# Patient Record
Sex: FEMALE | Race: WHITE | NOT HISPANIC OR LATINO | Employment: UNEMPLOYED | URBAN - METROPOLITAN AREA
[De-identification: names, ages, dates, MRNs, and addresses within clinical notes are randomized per-mention and may not be internally consistent; named-entity substitution may affect disease eponyms.]

---

## 2017-02-02 ENCOUNTER — APPOINTMENT (OUTPATIENT)
Dept: LAB | Age: 47
End: 2017-02-02
Payer: COMMERCIAL

## 2017-02-02 ENCOUNTER — TRANSCRIBE ORDERS (OUTPATIENT)
Dept: ADMINISTRATIVE | Age: 47
End: 2017-02-02

## 2017-02-02 DIAGNOSIS — N93.9 ABNORMAL UTERINE BLEEDING: ICD-10-CM

## 2017-02-02 DIAGNOSIS — N93.9 ABNORMAL UTERINE BLEEDING: Primary | ICD-10-CM

## 2017-02-02 LAB
ERYTHROCYTE [DISTWIDTH] IN BLOOD BY AUTOMATED COUNT: 12.7 % (ref 11.6–15.1)
HCT VFR BLD AUTO: 44.8 % (ref 34.8–46.1)
HGB BLD-MCNC: 15.3 G/DL (ref 11.5–15.4)
MCH RBC QN AUTO: 31.5 PG (ref 26.8–34.3)
MCHC RBC AUTO-ENTMCNC: 34.2 G/DL (ref 31.4–37.4)
MCV RBC AUTO: 92 FL (ref 82–98)
PLATELET # BLD AUTO: 389 THOUSANDS/UL (ref 149–390)
PMV BLD AUTO: 10 FL (ref 8.9–12.7)
PROLACTIN SERPL-MCNC: 19.3 NG/ML
RBC # BLD AUTO: 4.85 MILLION/UL (ref 3.81–5.12)
TSH SERPL DL<=0.05 MIU/L-ACNC: 4.72 UIU/ML (ref 0.36–3.74)
WBC # BLD AUTO: 13.63 THOUSAND/UL (ref 4.31–10.16)

## 2017-02-02 PROCEDURE — 84443 ASSAY THYROID STIM HORMONE: CPT

## 2017-02-02 PROCEDURE — 36415 COLL VENOUS BLD VENIPUNCTURE: CPT

## 2017-02-02 PROCEDURE — 84146 ASSAY OF PROLACTIN: CPT

## 2017-02-02 PROCEDURE — 85027 COMPLETE CBC AUTOMATED: CPT

## 2017-03-07 ENCOUNTER — APPOINTMENT (OUTPATIENT)
Dept: LAB | Age: 47
End: 2017-03-07
Payer: COMMERCIAL

## 2017-03-07 ENCOUNTER — TRANSCRIBE ORDERS (OUTPATIENT)
Dept: ADMINISTRATIVE | Age: 47
End: 2017-03-07

## 2017-03-07 DIAGNOSIS — F01.50 VASCULAR DEMENTIA WITH DEPRESSED MOOD (HCC): Primary | ICD-10-CM

## 2017-03-07 DIAGNOSIS — F32.A VASCULAR DEMENTIA WITH DEPRESSED MOOD (HCC): Primary | ICD-10-CM

## 2017-03-07 DIAGNOSIS — N93.9 VAGINAL HEMORRHAGE: ICD-10-CM

## 2017-03-07 DIAGNOSIS — F01.50 VASCULAR DEMENTIA WITH DEPRESSED MOOD (HCC): ICD-10-CM

## 2017-03-07 DIAGNOSIS — E03.9 UNSPECIFIED HYPOTHYROIDISM: ICD-10-CM

## 2017-03-07 DIAGNOSIS — N93.9 VAGINAL HEMORRHAGE: Primary | ICD-10-CM

## 2017-03-07 DIAGNOSIS — R73.01 IMPAIRED FASTING GLUCOSE: ICD-10-CM

## 2017-03-07 DIAGNOSIS — F32.A VASCULAR DEMENTIA WITH DEPRESSED MOOD (HCC): ICD-10-CM

## 2017-03-07 LAB
ALBUMIN SERPL BCP-MCNC: 3.5 G/DL (ref 3.5–5)
ALP SERPL-CCNC: 97 U/L (ref 46–116)
ALT SERPL W P-5'-P-CCNC: 49 U/L (ref 12–78)
ANION GAP SERPL CALCULATED.3IONS-SCNC: 6 MMOL/L (ref 4–13)
AST SERPL W P-5'-P-CCNC: 28 U/L (ref 5–45)
B-HCG SERPL-ACNC: <2 MIU/ML
BASOPHILS # BLD AUTO: 0.03 THOUSANDS/ΜL (ref 0–0.1)
BASOPHILS NFR BLD AUTO: 0 % (ref 0–1)
BILIRUB SERPL-MCNC: 0.52 MG/DL (ref 0.2–1)
BUN SERPL-MCNC: 10 MG/DL (ref 5–25)
CALCIUM SERPL-MCNC: 9.2 MG/DL (ref 8.3–10.1)
CHLORIDE SERPL-SCNC: 103 MMOL/L (ref 100–108)
CHOLEST SERPL-MCNC: 197 MG/DL (ref 50–200)
CO2 SERPL-SCNC: 30 MMOL/L (ref 21–32)
CREAT SERPL-MCNC: 1.14 MG/DL (ref 0.6–1.3)
EOSINOPHIL # BLD AUTO: 0.37 THOUSAND/ΜL (ref 0–0.61)
EOSINOPHIL NFR BLD AUTO: 3 % (ref 0–6)
ERYTHROCYTE [DISTWIDTH] IN BLOOD BY AUTOMATED COUNT: 12.3 % (ref 11.6–15.1)
EST. AVERAGE GLUCOSE BLD GHB EST-MCNC: 126 MG/DL
GFR SERPL CREATININE-BSD FRML MDRD: 51.1 ML/MIN/1.73SQ M
GLUCOSE SERPL-MCNC: 108 MG/DL (ref 65–140)
HBA1C MFR BLD: 6 % (ref 4.2–6.3)
HCT VFR BLD AUTO: 42.7 % (ref 34.8–46.1)
HDLC SERPL-MCNC: 40 MG/DL (ref 40–60)
HGB BLD-MCNC: 14.6 G/DL (ref 11.5–15.4)
LDLC SERPL CALC-MCNC: 118 MG/DL (ref 0–100)
LYMPHOCYTES # BLD AUTO: 4.67 THOUSANDS/ΜL (ref 0.6–4.47)
LYMPHOCYTES NFR BLD AUTO: 36 % (ref 14–44)
MCH RBC QN AUTO: 30.9 PG (ref 26.8–34.3)
MCHC RBC AUTO-ENTMCNC: 34.2 G/DL (ref 31.4–37.4)
MCV RBC AUTO: 91 FL (ref 82–98)
MONOCYTES # BLD AUTO: 0.59 THOUSAND/ΜL (ref 0.17–1.22)
MONOCYTES NFR BLD AUTO: 5 % (ref 4–12)
NEUTROPHILS # BLD AUTO: 7.17 THOUSANDS/ΜL (ref 1.85–7.62)
NEUTS SEG NFR BLD AUTO: 56 % (ref 43–75)
NRBC BLD AUTO-RTO: 0 /100 WBCS
PLATELET # BLD AUTO: 433 THOUSANDS/UL (ref 149–390)
PMV BLD AUTO: 9.5 FL (ref 8.9–12.7)
POTASSIUM SERPL-SCNC: 4 MMOL/L (ref 3.5–5.3)
PROT SERPL-MCNC: 7.6 G/DL (ref 6.4–8.2)
RBC # BLD AUTO: 4.72 MILLION/UL (ref 3.81–5.12)
SODIUM SERPL-SCNC: 139 MMOL/L (ref 136–145)
T4 FREE SERPL-MCNC: 1.07 NG/DL (ref 0.76–1.46)
TRIGL SERPL-MCNC: 195 MG/DL
TSH SERPL DL<=0.05 MIU/L-ACNC: 3.13 UIU/ML (ref 0.36–3.74)
WBC # BLD AUTO: 12.86 THOUSAND/UL (ref 4.31–10.16)

## 2017-03-07 PROCEDURE — 83036 HEMOGLOBIN GLYCOSYLATED A1C: CPT

## 2017-03-07 PROCEDURE — 84439 ASSAY OF FREE THYROXINE: CPT

## 2017-03-07 PROCEDURE — 84443 ASSAY THYROID STIM HORMONE: CPT

## 2017-03-07 PROCEDURE — 84702 CHORIONIC GONADOTROPIN TEST: CPT

## 2017-03-07 PROCEDURE — 80061 LIPID PANEL: CPT

## 2017-03-07 PROCEDURE — 80053 COMPREHEN METABOLIC PANEL: CPT

## 2017-03-07 PROCEDURE — 85025 COMPLETE CBC W/AUTO DIFF WBC: CPT

## 2017-03-07 PROCEDURE — 36415 COLL VENOUS BLD VENIPUNCTURE: CPT

## 2018-04-03 ENCOUNTER — APPOINTMENT (OUTPATIENT)
Dept: LAB | Age: 48
End: 2018-04-03
Payer: COMMERCIAL

## 2018-04-03 ENCOUNTER — TRANSCRIBE ORDERS (OUTPATIENT)
Dept: ADMINISTRATIVE | Age: 48
End: 2018-04-03

## 2018-04-03 DIAGNOSIS — I10 MALIGNANT HYPERTENSION: ICD-10-CM

## 2018-04-03 DIAGNOSIS — R73.01 IMPAIRED FASTING GLUCOSE: Primary | ICD-10-CM

## 2018-04-03 DIAGNOSIS — R73.01 IMPAIRED FASTING GLUCOSE: ICD-10-CM

## 2018-04-03 LAB
ALBUMIN SERPL BCP-MCNC: 3.9 G/DL (ref 3.5–5)
ALP SERPL-CCNC: 68 U/L (ref 46–116)
ALT SERPL W P-5'-P-CCNC: 34 U/L (ref 12–78)
ANION GAP SERPL CALCULATED.3IONS-SCNC: 7 MMOL/L (ref 4–13)
AST SERPL W P-5'-P-CCNC: 24 U/L (ref 5–45)
BASOPHILS # BLD AUTO: 0.02 THOUSANDS/ΜL (ref 0–0.1)
BASOPHILS NFR BLD AUTO: 0 % (ref 0–1)
BILIRUB SERPL-MCNC: 0.9 MG/DL (ref 0.2–1)
BUN SERPL-MCNC: 18 MG/DL (ref 5–25)
CALCIUM SERPL-MCNC: 8.9 MG/DL (ref 8.3–10.1)
CHLORIDE SERPL-SCNC: 106 MMOL/L (ref 100–108)
CHOLEST SERPL-MCNC: 179 MG/DL (ref 50–200)
CO2 SERPL-SCNC: 27 MMOL/L (ref 21–32)
CREAT SERPL-MCNC: 1.21 MG/DL (ref 0.6–1.3)
EOSINOPHIL # BLD AUTO: 0.21 THOUSAND/ΜL (ref 0–0.61)
EOSINOPHIL NFR BLD AUTO: 1 % (ref 0–6)
ERYTHROCYTE [DISTWIDTH] IN BLOOD BY AUTOMATED COUNT: 13.3 % (ref 11.6–15.1)
EST. AVERAGE GLUCOSE BLD GHB EST-MCNC: 126 MG/DL
GFR SERPL CREATININE-BSD FRML MDRD: 53 ML/MIN/1.73SQ M
GLUCOSE P FAST SERPL-MCNC: 106 MG/DL (ref 65–99)
HBA1C MFR BLD: 6 % (ref 4.2–6.3)
HCT VFR BLD AUTO: 42.1 % (ref 34.8–46.1)
HDLC SERPL-MCNC: 47 MG/DL (ref 40–60)
HGB BLD-MCNC: 13.8 G/DL (ref 11.5–15.4)
LDLC SERPL CALC-MCNC: 105 MG/DL (ref 0–100)
LYMPHOCYTES # BLD AUTO: 3.19 THOUSANDS/ΜL (ref 0.6–4.47)
LYMPHOCYTES NFR BLD AUTO: 21 % (ref 14–44)
MCH RBC QN AUTO: 30 PG (ref 26.8–34.3)
MCHC RBC AUTO-ENTMCNC: 32.8 G/DL (ref 31.4–37.4)
MCV RBC AUTO: 92 FL (ref 82–98)
MONOCYTES # BLD AUTO: 1.06 THOUSAND/ΜL (ref 0.17–1.22)
MONOCYTES NFR BLD AUTO: 7 % (ref 4–12)
NEUTROPHILS # BLD AUTO: 10.87 THOUSANDS/ΜL (ref 1.85–7.62)
NEUTS SEG NFR BLD AUTO: 71 % (ref 43–75)
NRBC BLD AUTO-RTO: 0 /100 WBCS
PLATELET # BLD AUTO: 337 THOUSANDS/UL (ref 149–390)
PMV BLD AUTO: 9.4 FL (ref 8.9–12.7)
POTASSIUM SERPL-SCNC: 3.9 MMOL/L (ref 3.5–5.3)
PROT SERPL-MCNC: 7.4 G/DL (ref 6.4–8.2)
RBC # BLD AUTO: 4.6 MILLION/UL (ref 3.81–5.12)
SODIUM SERPL-SCNC: 140 MMOL/L (ref 136–145)
TRIGL SERPL-MCNC: 134 MG/DL
TSH SERPL DL<=0.05 MIU/L-ACNC: 3.2 UIU/ML (ref 0.36–3.74)
WBC # BLD AUTO: 15.4 THOUSAND/UL (ref 4.31–10.16)

## 2018-04-03 PROCEDURE — 83036 HEMOGLOBIN GLYCOSYLATED A1C: CPT

## 2018-04-03 PROCEDURE — 80053 COMPREHEN METABOLIC PANEL: CPT

## 2018-04-03 PROCEDURE — 80061 LIPID PANEL: CPT

## 2018-04-03 PROCEDURE — 84443 ASSAY THYROID STIM HORMONE: CPT

## 2018-04-03 PROCEDURE — 36415 COLL VENOUS BLD VENIPUNCTURE: CPT

## 2018-04-03 PROCEDURE — 85025 COMPLETE CBC W/AUTO DIFF WBC: CPT

## 2018-10-03 ENCOUNTER — APPOINTMENT (EMERGENCY)
Dept: CT IMAGING | Facility: HOSPITAL | Age: 48
End: 2018-10-03
Payer: COMMERCIAL

## 2018-10-03 ENCOUNTER — APPOINTMENT (EMERGENCY)
Dept: RADIOLOGY | Facility: HOSPITAL | Age: 48
End: 2018-10-03
Payer: COMMERCIAL

## 2018-10-03 ENCOUNTER — HOSPITAL ENCOUNTER (EMERGENCY)
Facility: HOSPITAL | Age: 48
Discharge: HOME | End: 2018-10-04
Attending: EMERGENCY MEDICINE
Payer: COMMERCIAL

## 2018-10-03 VITALS
OXYGEN SATURATION: 99 % | HEIGHT: 66 IN | DIASTOLIC BLOOD PRESSURE: 80 MMHG | RESPIRATION RATE: 20 BRPM | TEMPERATURE: 97.7 F | HEART RATE: 62 BPM | SYSTOLIC BLOOD PRESSURE: 126 MMHG

## 2018-10-03 DIAGNOSIS — R42 DIZZINESS: ICD-10-CM

## 2018-10-03 DIAGNOSIS — R10.9 ABDOMINAL PAIN: Primary | ICD-10-CM

## 2018-10-03 DIAGNOSIS — N93.9 ABNORMAL UTERINE BLEEDING (AUB): ICD-10-CM

## 2018-10-03 LAB
ALBUMIN SERPL BCP-MCNC: 3.5 G/DL (ref 3.5–5)
ALP SERPL-CCNC: 66 U/L (ref 46–116)
ALT SERPL W P-5'-P-CCNC: 79 U/L (ref 12–78)
ANION GAP SERPL CALCULATED.3IONS-SCNC: 6 MMOL/L (ref 4–13)
AST SERPL W P-5'-P-CCNC: 57 U/L (ref 5–45)
BACTERIA UR QL AUTO: ABNORMAL /HPF
BASOPHILS # BLD AUTO: 0.03 THOUSANDS/ΜL (ref 0–0.1)
BASOPHILS NFR BLD AUTO: 0 % (ref 0–1)
BILIRUB SERPL-MCNC: 0.6 MG/DL (ref 0.2–1)
BILIRUB UR QL STRIP: NEGATIVE
BUN SERPL-MCNC: 19 MG/DL (ref 5–25)
CALCIUM SERPL-MCNC: 8.6 MG/DL (ref 8.3–10.1)
CHLORIDE SERPL-SCNC: 104 MMOL/L (ref 100–108)
CK SERPL-CCNC: 68 U/L (ref 26–192)
CLARITY UR: CLEAR
CO2 SERPL-SCNC: 28 MMOL/L (ref 21–32)
COLOR UR: YELLOW
CREAT SERPL-MCNC: 1.11 MG/DL (ref 0.6–1.3)
EOSINOPHIL # BLD AUTO: 0.24 THOUSAND/ΜL (ref 0–0.61)
EOSINOPHIL NFR BLD AUTO: 3 % (ref 0–6)
ERYTHROCYTE [DISTWIDTH] IN BLOOD BY AUTOMATED COUNT: 12.4 % (ref 11.6–15.1)
EXT PREG TEST URINE: NEGATIVE
GFR SERPL CREATININE-BSD FRML MDRD: 59 ML/MIN/1.73SQ M
GLUCOSE SERPL-MCNC: 103 MG/DL (ref 65–140)
GLUCOSE SERPL-MCNC: 118 MG/DL (ref 65–140)
GLUCOSE UR STRIP-MCNC: NEGATIVE MG/DL
HCT VFR BLD AUTO: 44.1 % (ref 34.8–46.1)
HGB BLD-MCNC: 14.8 G/DL (ref 11.5–15.4)
HGB UR QL STRIP.AUTO: ABNORMAL
IMM GRANULOCYTES # BLD AUTO: 0.01 THOUSAND/UL (ref 0–0.2)
IMM GRANULOCYTES NFR BLD AUTO: 0 % (ref 0–2)
KETONES UR STRIP-MCNC: NEGATIVE MG/DL
LEUKOCYTE ESTERASE UR QL STRIP: NEGATIVE
LIPASE SERPL-CCNC: 138 U/L (ref 73–393)
LYMPHOCYTES # BLD AUTO: 2.78 THOUSANDS/ΜL (ref 0.6–4.47)
LYMPHOCYTES NFR BLD AUTO: 33 % (ref 14–44)
MCH RBC QN AUTO: 31 PG (ref 26.8–34.3)
MCHC RBC AUTO-ENTMCNC: 33.6 G/DL (ref 31.4–37.4)
MCV RBC AUTO: 93 FL (ref 82–98)
MONOCYTES # BLD AUTO: 0.44 THOUSAND/ΜL (ref 0.17–1.22)
MONOCYTES NFR BLD AUTO: 5 % (ref 4–12)
NEUTROPHILS # BLD AUTO: 4.85 THOUSANDS/ΜL (ref 1.85–7.62)
NEUTS SEG NFR BLD AUTO: 59 % (ref 43–75)
NITRITE UR QL STRIP: NEGATIVE
NON-SQ EPI CELLS URNS QL MICRO: ABNORMAL /HPF
NRBC BLD AUTO-RTO: 0 /100 WBCS
PH UR STRIP.AUTO: 7 [PH] (ref 4.5–8)
PLATELET # BLD AUTO: 314 THOUSANDS/UL (ref 149–390)
PMV BLD AUTO: 9.3 FL (ref 8.9–12.7)
POTASSIUM SERPL-SCNC: 4.2 MMOL/L (ref 3.5–5.3)
PROT SERPL-MCNC: 7 G/DL (ref 6.4–8.2)
PROT UR STRIP-MCNC: NEGATIVE MG/DL
RBC # BLD AUTO: 4.77 MILLION/UL (ref 3.81–5.12)
RBC #/AREA URNS AUTO: ABNORMAL /HPF
SODIUM SERPL-SCNC: 138 MMOL/L (ref 136–145)
SP GR UR STRIP.AUTO: 1.01 (ref 1–1.03)
TROPONIN I SERPL-MCNC: <0.02 NG/ML
UROBILINOGEN UR QL STRIP.AUTO: 0.2 E.U./DL
WBC # BLD AUTO: 8.35 THOUSAND/UL (ref 4.31–10.16)
WBC #/AREA URNS AUTO: ABNORMAL /HPF

## 2018-10-03 PROCEDURE — 84484 ASSAY OF TROPONIN QUANT: CPT | Performed by: PHYSICIAN ASSISTANT

## 2018-10-03 PROCEDURE — 85025 COMPLETE CBC W/AUTO DIFF WBC: CPT | Performed by: PHYSICIAN ASSISTANT

## 2018-10-03 PROCEDURE — 82948 REAGENT STRIP/BLOOD GLUCOSE: CPT

## 2018-10-03 PROCEDURE — 96374 THER/PROPH/DIAG INJ IV PUSH: CPT

## 2018-10-03 PROCEDURE — 99285 EMERGENCY DEPT VISIT HI MDM: CPT

## 2018-10-03 PROCEDURE — 83690 ASSAY OF LIPASE: CPT | Performed by: PHYSICIAN ASSISTANT

## 2018-10-03 PROCEDURE — 96376 TX/PRO/DX INJ SAME DRUG ADON: CPT

## 2018-10-03 PROCEDURE — 93005 ELECTROCARDIOGRAM TRACING: CPT

## 2018-10-03 PROCEDURE — 81001 URINALYSIS AUTO W/SCOPE: CPT

## 2018-10-03 PROCEDURE — 96375 TX/PRO/DX INJ NEW DRUG ADDON: CPT

## 2018-10-03 PROCEDURE — 82550 ASSAY OF CK (CPK): CPT | Performed by: PHYSICIAN ASSISTANT

## 2018-10-03 PROCEDURE — 96361 HYDRATE IV INFUSION ADD-ON: CPT

## 2018-10-03 PROCEDURE — 81025 URINE PREGNANCY TEST: CPT | Performed by: PHYSICIAN ASSISTANT

## 2018-10-03 PROCEDURE — 80053 COMPREHEN METABOLIC PANEL: CPT | Performed by: PHYSICIAN ASSISTANT

## 2018-10-03 PROCEDURE — 96372 THER/PROPH/DIAG INJ SC/IM: CPT

## 2018-10-03 PROCEDURE — 74176 CT ABD & PELVIS W/O CONTRAST: CPT

## 2018-10-03 PROCEDURE — 71045 X-RAY EXAM CHEST 1 VIEW: CPT

## 2018-10-03 PROCEDURE — 36415 COLL VENOUS BLD VENIPUNCTURE: CPT | Performed by: PHYSICIAN ASSISTANT

## 2018-10-03 RX ORDER — ONDANSETRON 2 MG/ML
4 INJECTION INTRAMUSCULAR; INTRAVENOUS ONCE
Status: COMPLETED | OUTPATIENT
Start: 2018-10-03 | End: 2018-10-03

## 2018-10-03 RX ORDER — PROMETHAZINE HYDROCHLORIDE 25 MG/ML
25 INJECTION, SOLUTION INTRAMUSCULAR; INTRAVENOUS ONCE
Status: COMPLETED | OUTPATIENT
Start: 2018-10-03 | End: 2018-10-03

## 2018-10-03 RX ORDER — METOCLOPRAMIDE HYDROCHLORIDE 5 MG/ML
10 INJECTION INTRAMUSCULAR; INTRAVENOUS ONCE
Status: COMPLETED | OUTPATIENT
Start: 2018-10-03 | End: 2018-10-03

## 2018-10-03 RX ADMIN — METOCLOPRAMIDE 10 MG: 5 INJECTION, SOLUTION INTRAMUSCULAR; INTRAVENOUS at 22:28

## 2018-10-03 RX ADMIN — PROMETHAZINE HYDROCHLORIDE 25 MG: 25 INJECTION INTRAMUSCULAR; INTRAVENOUS at 22:28

## 2018-10-03 RX ADMIN — SODIUM CHLORIDE 1000 ML: 0.9 INJECTION, SOLUTION INTRAVENOUS at 19:50

## 2018-10-03 RX ADMIN — ONDANSETRON 4 MG: 2 INJECTION INTRAMUSCULAR; INTRAVENOUS at 21:49

## 2018-10-03 RX ADMIN — SODIUM CHLORIDE 1000 ML: 0.9 INJECTION, SOLUTION INTRAVENOUS at 22:29

## 2018-10-03 RX ADMIN — IOHEXOL 50 ML: 240 INJECTION, SOLUTION INTRATHECAL; INTRAVASCULAR; INTRAVENOUS; ORAL at 22:32

## 2018-10-03 RX ADMIN — ONDANSETRON 4 MG: 2 INJECTION INTRAMUSCULAR; INTRAVENOUS at 19:50

## 2018-10-03 NOTE — ED PROVIDER NOTES
History  Chief Complaint   Patient presents with    Chest Pain     chest pain just started, "I have had right sided pain, I have something on my ovaries and I have been bleeding every day for two months  i am very dizzy and can't keep my eyes open " Pt unsteady on her feet in triage  pt fell two weeks ago and "has a goose egg on the back of my head, I don't rmemeber anything  But I didnt get looked at " Pt also reports recently starting gabapentin     Euna Countess Ulysses Grate ([id number]) is a 49 y o   female Adult patient, presenting to the Emergency Department, accompanied by Significant Other, who presents with a chief complaint of Patient presents with: Chest Pain: chest pain just started, "I have had right sided pain, I have something on my ovaries and I have been bleeding every day for two months  i am very dizzy and can't keep my eyes open " Pt unsteady on her feet in triage  pt fell two weeks ago and "has a goose egg on the back of my head, I don't rmemeber anything  But I didnt get looked at " Pt also reports recently starting gabapentin  The patient is a 48-year-old female, seen with her , who assisted with providing both acute and chronic medical history  The patient presents with a relevant medical history including ovarian cysts-ruptured, fibromyalgia, and chronic regional pain syndrome  In addition, the patient has a history of multiple orthopedic surgeries, including cervical fusions, bilateral knee replacements, multiple shoulder reconstructions, as well as multiple other surgical repairs  Patient presents with a chief complaint of chest pain, which he states started earlier today  She states that chest pain is associated with pain in her left anterior chest, with slight radiation to her left arm, associated with paresthesias in both hands    He additionally, the patient presents with right lower quadrant abdominal pain, which she states is moderately severe, as her present for the past several weeks  She describes the pain to her abdomen as throbbing and occasionally sharp, and highly localized to right lower quadrant  She states the pain is worse with direct pressure and movement, and nothing that she has found as relieved the pain  The patient states she does have other areas pain, but these are associated with chronic pain, as previously mentioned, as the patient has had multiple orthopedic injuries and repairs in the past     Additionally, the patient states that she has had spotty vaginal bleeding for the past 8 weeks, which is notably irregular for her  She states that she has had to use panty liners for this, and normally requires one per day, and does not bleed through  Finally, the patient states that she has been slightly lightheaded over the past several weeks, which she believes to be associated with a viral upper respiratory illness, which she has been recovering from  The patient states she has not had fevers in the past several weeks, has not had cough, abnormal back pain, abnormal joint pain, has not had headache, syncope, near-syncope, or other constitutional symptoms  Socially, the patient states that she was previously living in [place], but was evicted from her home, and is "currently in between residences"  As such, the patient has been living with friends for the past few weeks  Medications: Medications, as per nursing MAR  Allergies: No reported food allergies, No reported environmental allergies, Percocet & Welbutrin  Overnight Hospitalizations: No prior hospitalizations  Vaccinations: Vaccinations UTD, as per Patient  Past Medical History: No relevant past medical history  Past Surgical History: No relevant past surgical history    Code Status: No Order              None       Past Medical History:   Diagnosis Date    Tachycardia        No past surgical history on file  No family history on file    I have reviewed and agree with the history as documented  Social History   Substance Use Topics    Smoking status: Not on file    Smokeless tobacco: Never Used    Alcohol use No        Review of Systems  Review of Systems: The Patient/Parent Denies the following: Negative, Except as noted in HPI  Physical Exam  Physical Exam  General: 49 y o  female patient, who appears their stated age, in mild distress  Skin: No rashes, masses, or lesions noted  HEENT: Atraumatic & Normocephalic  External ears normal, with no noted abnormalities or deformities  Bilateral canals examined, without noted edema or discomfort  No pain while pulling the tragus  TM well visualized bilaterally, with no noted obstruction, effusion, erythema, or air fluid levels  No noted enlargement of the mastoid processes bilaterally  EOMI, PERRL, Conjunctiva without injection bilaterally  No conjunctival drainage noted bilaterally  Nares patent bilaterally, with no noted obstructions, erythema, or drainage  No noted rhinorrhea  Pharynx well visualized, with no exudate noted in the posterior pharynx  Tonsils are not enlarged  Gingival surfaces are within normal limits  Neck: Soft, supple, and non-tender  No enlargement of the anterior cervical, posterior cervical, or occipital lymph notes  Cardiac: Regular rate and rhythm, with no noted murmurs, rubs, or gallops  Pulmonary: Normal Appearance  Clear to ascultation, with no noted rales, rhonchi, or wheezes  Abdomen: Normal Inspection of the abdomen, Normal percussion to all quadrents, Normal Bowel Sounds, Negative pain with light palpation, Negative pain with deep palpation, Negative Cough Test, Negative Heel tap test, Negative Rebound Tenderness Case Siddiqui), Negative Referred Rebound Tenderness, Negative Rovsig Sign, Negative Psoas Sign, Negative Obturator Sign, Negative Lange Sign, Negative CVA Tenderness Bilaterally  MSK: Joint ROM grossly normal, actively and passively, to all extremities  No noted joint swelling   Normal Gait    Neuro: Cranial nerves 2-12 grossly intact  Normal sensation grossly  Psych: Normal affect and responsiveness  Abdominal Exam Re-Evaluation immediately prior to discharge:   General: Patient is well appearing, without symptoms of acute distress  The patient is seen lying in bed, currently asymptomatic  Abdomen: Normal Inspection of the abdomen, Normal percussion to all quadrents, Normal Bowel Sounds, Negative pain with light palpation, Negative pain with deep palpation, Negative Cough Test, Negative Heel tap test, Negative Rebound Tenderness Dicie Schirmer), Negative Referred Rebound Tenderness, Negative Rovsig Sign, Negative Psoas Sign, Negative Obturator Sign, Negative Lange Sign, Negative CVA Tenderness Bilaterally  No acute peritoneal signs were noted         Vital Signs  ED Triage Vitals   Temperature Pulse Respirations Blood Pressure SpO2   10/03/18 1924 10/03/18 1924 10/03/18 1924 10/03/18 1924 10/03/18 1924   97 7 °F (36 5 °C) 70 14 (!) 203/101 98 %      Temp Source Heart Rate Source Patient Position - Orthostatic VS BP Location FiO2 (%)   10/03/18 1924 10/03/18 2029 10/03/18 2029 10/03/18 2029 --   Oral Monitor Sitting Left arm       Pain Score       10/03/18 1924       7           Vitals:    10/03/18 1924 10/03/18 2029 10/03/18 2200   BP: (!) 203/101 133/78 126/80   Pulse: 70 69 62   Patient Position - Orthostatic VS:  Sitting Lying       Visual Acuity      ED Medications  Medications   sodium chloride 0 9 % bolus 1,000 mL (0 mL Intravenous Stopped 10/3/18 2144)   ondansetron (ZOFRAN) injection 4 mg (4 mg Intravenous Given 10/3/18 1950)   ondansetron (ZOFRAN) injection 4 mg (4 mg Intravenous Given 10/3/18 2149)   sodium chloride 0 9 % bolus 1,000 mL (0 mL Intravenous Stopped 10/3/18 2345)   metoclopramide (REGLAN) injection 10 mg (10 mg Intravenous Given 10/3/18 2228)   promethazine (PHENERGAN) injection 25 mg (25 mg Intramuscular Given 10/3/18 2228)   iohexol (OMNIPAQUE) 240 MG/ML solution 50 mL (50 mL Oral Given 10/3/18 2232)       Diagnostic Studies  Results Reviewed     Procedure Component Value Units Date/Time    POCT pregnancy, urine [[id number]]  (Normal) Resulted:  10/03/18 2356    Lab Status:  Final result Updated:  10/03/18 2356     EXT PREG TEST UR (Ref: Negative) Negative    Urine Microscopic [[id number]]  (Abnormal) Collected:  10/03/18 2252    Lab Status:  Final result Specimen:  Urine from Urine, Clean Catch Updated:  10/03/18 2301     RBC, UA 0-1 (A) /hpf      WBC, UA 0-1 (A) /hpf      Epithelial Cells Occasional /hpf      Bacteria, UA Occasional /hpf     ED Urine Macroscopic [[id number]]  (Abnormal) Collected:  10/03/18 2252    Lab Status:  Final result Specimen:  Urine Updated:  10/03/18 2244     Color, UA Yellow     Clarity, UA Clear     pH, UA 7 0     Leukocytes, UA Negative     Nitrite, UA Negative     Protein, UA Negative mg/dl      Glucose, UA Negative mg/dl      Ketones, UA Negative mg/dl      Urobilinogen, UA 0 2 E U /dl      Bilirubin, UA Negative     Blood, UA Moderate (A)     Specific [place], UA 1 015    Narrative:       CLINITEK RESULT    Lipase [[id number]]  (Normal) Collected:  10/03/18 1951    Lab Status:  Final result Specimen:  Blood from Arm, Right Updated:  10/03/18 2018     Lipase 138 u/L     Troponin I [[id number]]  (Normal) Collected:  10/03/18 1950    Lab Status:  Final result Specimen:  Blood from Arm, Right Updated:  10/03/18 2017     Troponin I <0 02 ng/mL     Comprehensive metabolic panel [[id number]]  (Abnormal) Collected:  10/03/18 1950    Lab Status:  Final result Specimen:  Blood from Arm, Right Updated:  10/03/18 2015     Sodium 138 mmol/L      Potassium 4 2 mmol/L      Chloride 104 mmol/L      CO2 28 mmol/L      ANION GAP 6 mmol/L      BUN 19 mg/dL      Creatinine 1 11 mg/dL      Glucose 118 mg/dL      Calcium 8 6 mg/dL      AST 57 (H) U/L      ALT 79 (H) U/L      Alkaline Phosphatase 66 U/L      Total Protein 7 0 g/dL      Albumin 3 5 g/dL      Total Bilirubin 0 60 mg/dL      eGFR 59 ml/min/1 73sq m     Narrative:         National Kidney Disease Education Program recommendations are as follows:  GFR calculation is accurate only with a steady state creatinine  Chronic Kidney disease less than 60 ml/min/1 73 sq  meters  Kidney failure less than 15 ml/min/1 73 sq  meters  CK Total with Reflex CKMB [[id number]]  (Normal) Collected:  10/03/18 1950    Lab Status:  Final result Specimen:  Blood from Arm, Right Updated:  10/03/18 2015     Total CK 68 U/L     CBC and differential [[id number]] Collected:  10/03/18 1950    Lab Status:  Final result Specimen:  Blood from Arm, Right Updated:  10/03/18 1956     WBC 8 35 Thousand/uL      RBC 4 77 Million/uL      Hemoglobin 14 8 g/dL      Hematocrit 44 1 %      MCV 93 fL      MCH 31 0 pg      MCHC 33 6 g/dL      RDW 12 4 %      MPV 9 3 fL      Platelets 833 Thousands/uL      nRBC 0 /100 WBCs      Neutrophils Relative 59 %      Immat GRANS % 0 %      Lymphocytes Relative 33 %      Monocytes Relative 5 %      Eosinophils Relative 3 %      Basophils Relative 0 %      Neutrophils Absolute 4 85 Thousands/µL      Immature Grans Absolute 0 01 Thousand/uL      Lymphocytes Absolute 2 78 Thousands/µL      Monocytes Absolute 0 44 Thousand/µL      Eosinophils Absolute 0 24 Thousand/µL      Basophils Absolute 0 03 Thousands/µL     Fingerstick Glucose (POCT) [[id number]]  (Normal) Collected:  10/03/18 1927    Lab Status:  Final result Updated:  10/03/18 1928     POC Glucose 103 mg/dl                  CT abdomen pelvis wo contrast   Final Result by Susanna Mann MD (10/04 0052)      Moderate hiatal hernia noted  Liver is diffusely decreased in density consistent with fatty change  No renal stones  No small bowel obstruction  Normal appendix  Consider contrast exam in the appropriate clinical setting           Workstation performed: [id number]         XR chest 1 view portable   ED Interpretation by Katie Spangler PA-C (10/03 2120)   Chest X-Ray Interpretation:   The patient's chest x-ray was reviewed by myself and found to be without acute intrapulmonary infiltrates, osseous abnormalities, or noted pneumothoraces  No acute intrapulmonary processes were seen  The results of this study were related to the patient  Procedures  ECG 12 Lead Documentation  Date/Time: 10/3/2018 7:42 PM  Performed by: Jose Ask by: Machelle Henriquez     Indications / Diagnosis:  Chest pain  ECG reviewed by me, the ED Provider: yes    Patient location:  ED  Previous ECG:     Previous ECG:  Compared to current    Comparison ECG info:  Comparison EKG 21 November 2013, 10:35 p m , rate 76, normal sinus rhythm, normal VT interval, normal QRS duration, normal QT interval, no noted ST segment elevation or depression, no noted ectopic beats, normal axis  Similarity:  No change    Comparison to cardiac monitor: Yes    Interpretation:     Interpretation: normal    Rate:     ECG rate:  68    ECG rate assessment: normal    Rhythm:     Rhythm: sinus rhythm    Ectopy:     Ectopy: none    QRS:     QRS axis:  Normal    QRS intervals:  Normal  Conduction:     Conduction: normal    ST segments:     ST segments:  Normal  T waves:     T waves: normal    Comments:      EKG interpretation for EKG performed by EMS 3 October 2018, 7:35 p m , rate 67, normal sinus rhythm, normal VT interval, normal QRS duration, normal QT interval, no noted ST segment elevation or depression, no noted ectopic beats, normal axis  Phone Contacts  ED Phone Contact    ED Course                               MDM  Number of Diagnoses or Management Options  Abdominal pain: new and requires workup  Abnormal uterine bleeding (AUB): new and requires workup  Dizziness: new and requires workup  Diagnosis management comments:  The patient presents to the emergency department with a constellation of complaints, including abnormal uterine bleeding, left-sided chest pain, and lightheadedness, which has all been present for the past 8 weeks  The patient states that she has not had prior medical evaluation for any of these conditions, but is concerned because they have persisted  In addition, the patient states she has had very mild scattered abdominal pain, primarily localized to the right lower quadrant, which has been intermittent over the past several weeks  The patient denies any constitutional symptoms during the patient interview, with the exception of occasional lightheadedness, which she associated with chronic dehydration due to decreased p o  intake  The patient's evaluation included abdominal examination, abdominal laboratory analysis, which revealed normal result, abdominal CT scan with p o  contrast, as the patient is allergic to IV contrast, which also revealed normal result, with the exception of a type 1 sliding hiatal hernia, which the patient had previously not been diagnosed with  In addition, because the patient's chest pain was present on physical exam, at the time of presentation, the evaluation also included a cardiac evaluation, which included cardiac enzymes, which were all negative, as well as an EKG and chest x-ray, which were also within normal limits  As such, the patient was discharged, hemodynamically stable, with a diagnosis of hiatal hernia, with likely gastroesophageal reflux disease  In addition, the patient's abnormal uterine bleeding is not clinically significant, as it has only noted to be spotting, particularly the past several weeks  As such, the patient was instructed to follow up with outpatient OBGYN for further evaluation management of this  When considering this, the patient was not noted to be anemic, and had no noted tachycardia, or other symptoms of clinical dehydration         Amount and/or Complexity of Data Reviewed  Clinical lab tests: ordered and reviewed  Tests in the radiology section of CPT®: ordered and reviewed  Tests in the medicine section of CPT®: reviewed and ordered  Decide to obtain previous medical records or to obtain history from someone other than the patient: yes  Obtain history from someone other than the patient: yes  Review and summarize past medical records: yes  Discuss the patient with other providers: yes  Independent visualization of images, tracings, or specimens: yes    Risk of Complications, Morbidity, and/or Mortality  Presenting problems: moderate  Diagnostic procedures: moderate  Management options: moderate    Patient Progress  Patient progress: stable    CritCare Time    Disposition  Final diagnoses:   Abdominal pain   Abnormal uterine bleeding (AUB)   Dizziness     Time reflects when diagnosis was documented in both MDM as applicable and the Disposition within this note     Time User Action Codes Description Comment    10/4/2018  1:33 AM Rhesa Braydon Add [R10 9] Abdominal pain     10/4/2018  1:33 AM Rhesa Braydon Add [N93 9] Abnormal uterine bleeding (AUB)     10/4/2018  1:33 AM Rhesa Braydon Add [R42] Dizziness       ED Disposition     ED Disposition Condition Comment    Discharge  Marva Salgado discharge to home/self care  Condition at discharge: Good        Follow-up Information     Follow up With Specialties Details Why Contact Info    Kenzie Dixon MD Family Medicine In 3 days If symptoms worsen, As needed [address] [place] [address]  [phone]            Discharge Medication List as of 10/4/2018  1:36 AM      START taking these medications    Details   meclizine (ANTIVERT) 25 mg tablet Take 0 5-1 tablets, Every 6 hours, as needed for lightheadedness  , Print      ondansetron (ZOFRAN) 4 mg tablet Take 1 tablet (4 mg total) by mouth every 6 (six) hours, Starting Thu 10/4/2018, Print           No discharge procedures on file      ED Provider  Electronically Signed by           Kathleen Ferguson PA-C  10/04/18 3423

## 2018-10-04 LAB
ATRIAL RATE: 68 BPM
ATRIAL RATE: 70 BPM
P AXIS: 53 DEGREES
P AXIS: 54 DEGREES
PR INTERVAL: 120 MS
PR INTERVAL: 124 MS
QRS AXIS: 55 DEGREES
QRS AXIS: 56 DEGREES
QRSD INTERVAL: 78 MS
QRSD INTERVAL: 80 MS
QT INTERVAL: 400 MS
QT INTERVAL: 404 MS
QTC INTERVAL: 425 MS
QTC INTERVAL: 427 MS
T WAVE AXIS: 28 DEGREES
T WAVE AXIS: 30 DEGREES
VENTRICULAR RATE: 67 BPM
VENTRICULAR RATE: 68 BPM

## 2018-10-04 PROCEDURE — 93010 ELECTROCARDIOGRAM REPORT: CPT | Performed by: INTERNAL MEDICINE

## 2018-10-04 RX ORDER — ONDANSETRON 4 MG/1
4 TABLET, FILM COATED ORAL EVERY 6 HOURS
Qty: 30 TABLET | Refills: 0 | Status: SHIPPED | OUTPATIENT
Start: 2018-10-04 | End: 2021-11-22

## 2018-10-04 RX ORDER — MECLIZINE HYDROCHLORIDE 25 MG/1
TABLET ORAL
Qty: 30 TABLET | Refills: 0 | Status: SHIPPED | OUTPATIENT
Start: 2018-10-04 | End: 2021-11-22

## 2018-10-04 NOTE — ED NOTES
Discharge instruction given to patient  Follow-up care encouraged  Medication and prescriptions reviewed  No questions or concerns at this time  Patient able to ambulate out without difficulty        Nicolás Zhu RN  10/04/18 8766

## 2018-10-04 NOTE — DISCHARGE INSTRUCTIONS
Brittany Sung 25   Call [facility] at [phone] ([phone]) to obtain a primary care physician  They will be able to schedule you with a physician who sees patients with your insurance and physicians who see patients without insurance  In addition, they are able to schedule patients in all of the specialties offered by [address] [place] [address], on any campus  Additionally, when you call this number, they can schedule for any office, any provider, any specialty, at any location, within the [address] [place] [address] Avenue  They can also evaluate your insurance situation to ensure there are no concerns with coverage, while scheduling your appointment  Abdominal Pain, Ambulatory Care   GENERAL INFORMATION:   Abdominal pain  can be dull, achy, or sharp  You may have pain in one area of your abdomen, or in your entire abdomen  Your pain may be caused by constipation, food sensitivity or poisoning, infection, or a blockage  Abdominal pain can also be caused by a hernia, appendicitis, or an ulcer  The cause of your abdominal pain may be unknown  Seek immediate care for the following symptoms:   · New chest pain or shortness of breath    · Pulsing pain in your upper abdomen or lower back that suddenly becomes constant    · Pain in the right lower abdominal area that worsens with movement    · Fever over 100 4°F (38°C) or shaking chills    · Vomiting and you cannot keep food or fluids down    · Pain does not improve or gets worse over the next 8 to 12 hours    · Blood in your vomit or bowel movements, or they look black and tarry    · Skin or the whites of your eyes turn yellow    · Large amount of vaginal bleeding that is not your monthly period  Treatment for abdominal pain  may include medicine to calm your stomach, prevent vomiting, or decrease pain  Follow up with your healthcare provider as directed:  Write down your questions so you remember to ask them during your visits     CARE AGREEMENT:   You have the right to help plan your care  Learn about your health condition and how it may be treated  Discuss treatment options with your caregivers to decide what care you want to receive  You always have the right to refuse treatment  The above information is an  only  It is not intended as medical advice for individual conditions or treatments  Talk to your doctor, nurse or pharmacist before following any medical regimen to see if it is safe and effective for you  © 2014 [address] is for End User's use only and may not be sold, redistributed or otherwise used for commercial purposes  All illustrations and images included in CareNotes® are the copyrighted property of [employer]  or [employer] Hold[employer]

## 2019-10-01 ENCOUNTER — TRANSCRIBE ORDERS (OUTPATIENT)
Dept: ADMINISTRATIVE | Facility: HOSPITAL | Age: 49
End: 2019-10-01

## 2019-10-01 DIAGNOSIS — G96.12: ICD-10-CM

## 2019-10-01 DIAGNOSIS — M48.07 STENOSIS OF LUMBOSACRAL SPINE: ICD-10-CM

## 2019-10-01 DIAGNOSIS — M51.17 SCIATIC RADICULITIS: Primary | ICD-10-CM

## 2019-10-02 ENCOUNTER — HOSPITAL ENCOUNTER (OUTPATIENT)
Dept: NEUROLOGY | Facility: CLINIC | Age: 49
Discharge: HOME | End: 2019-10-02
Payer: COMMERCIAL

## 2019-10-02 DIAGNOSIS — G96.12: ICD-10-CM

## 2019-10-02 DIAGNOSIS — M51.17 SCIATIC RADICULITIS: ICD-10-CM

## 2019-10-02 DIAGNOSIS — M48.07 STENOSIS OF LUMBOSACRAL SPINE: ICD-10-CM

## 2019-10-02 PROCEDURE — 95911 NRV CNDJ TEST 9-10 STUDIES: CPT | Performed by: PSYCHIATRY & NEUROLOGY

## 2019-10-02 PROCEDURE — 95886 MUSC TEST DONE W/N TEST COMP: CPT | Performed by: PSYCHIATRY & NEUROLOGY

## 2020-07-09 ENCOUNTER — TRANSCRIBE ORDERS (OUTPATIENT)
Dept: ADMINISTRATIVE | Facility: HOSPITAL | Age: 50
End: 2020-07-09

## 2020-07-09 DIAGNOSIS — G47.30 SLEEP APNEA, UNSPECIFIED TYPE: Primary | ICD-10-CM

## 2020-07-27 ENCOUNTER — OFFICE VISIT (OUTPATIENT)
Dept: SLEEP CENTER | Facility: CLINIC | Age: 50
End: 2020-07-27
Payer: COMMERCIAL

## 2020-07-27 VITALS
HEART RATE: 75 BPM | SYSTOLIC BLOOD PRESSURE: 118 MMHG | WEIGHT: 233 LBS | BODY MASS INDEX: 37.45 KG/M2 | DIASTOLIC BLOOD PRESSURE: 80 MMHG | OXYGEN SATURATION: 95 % | HEIGHT: 66 IN

## 2020-07-27 DIAGNOSIS — G47.30 SLEEP APNEA, UNSPECIFIED TYPE: Primary | ICD-10-CM

## 2020-07-27 DIAGNOSIS — G47.419 NARCOLEPSY WITHOUT CATAPLEXY: ICD-10-CM

## 2020-07-27 PROCEDURE — 99205 OFFICE O/P NEW HI 60 MIN: CPT | Performed by: PSYCHIATRY & NEUROLOGY

## 2020-07-27 RX ORDER — ALBUTEROL SULFATE 90 UG/1
2 AEROSOL, METERED RESPIRATORY (INHALATION) EVERY 4 HOURS PRN
COMMUNITY
Start: 2020-07-08

## 2020-07-27 RX ORDER — BACLOFEN 10 MG/1
10 TABLET ORAL EVERY EVENING
COMMUNITY
End: 2021-11-22

## 2020-07-27 RX ORDER — ESOMEPRAZOLE MAGNESIUM 40 MG/1
40 CAPSULE, DELAYED RELEASE ORAL EVERY EVENING
COMMUNITY
Start: 2020-07-13 | End: 2021-12-01 | Stop reason: HOSPADM

## 2020-07-27 RX ORDER — ERENUMAB-AOOE 70 MG/ML
INJECTION SUBCUTANEOUS
COMMUNITY
Start: 2020-07-09 | End: 2021-11-22

## 2020-07-27 RX ORDER — GABAPENTIN 600 MG/1
1200 TABLET ORAL
COMMUNITY
Start: 2020-07-17

## 2020-07-27 RX ORDER — DULOXETIN HYDROCHLORIDE 60 MG/1
60 CAPSULE, DELAYED RELEASE ORAL EVERY EVENING
COMMUNITY
Start: 2020-07-08

## 2020-07-27 RX ORDER — BISOPROLOL FUMARATE 5 MG/1
5 TABLET ORAL EVERY EVENING
COMMUNITY
Start: 2020-06-25 | End: 2021-12-01 | Stop reason: HOSPADM

## 2020-07-27 RX ORDER — DICLOFENAC SODIUM 75 MG/1
75 TABLET, DELAYED RELEASE ORAL DAILY
COMMUNITY
Start: 2020-06-25 | End: 2021-11-22

## 2020-07-27 RX ORDER — CALCIUM CARBONATE/VITAMIN D3 500-10/5ML
400 LIQUID (ML) ORAL
COMMUNITY
End: 2021-11-22

## 2020-07-27 RX ORDER — METFORMIN HYDROCHLORIDE 500 MG/1
500 TABLET, EXTENDED RELEASE ORAL EVERY EVENING
COMMUNITY
Start: 2020-04-16 | End: 2021-12-01 | Stop reason: HOSPADM

## 2020-07-27 RX ORDER — INDOMETHACIN 50 MG/1
CAPSULE ORAL
COMMUNITY
Start: 2018-11-06 | End: 2021-11-22

## 2020-07-27 RX ORDER — GABAPENTIN 300 MG/1
300 CAPSULE ORAL DAILY
COMMUNITY
Start: 2020-07-17

## 2020-07-27 RX ORDER — ZOLPIDEM TARTRATE 5 MG/1
5 TABLET ORAL
Qty: 2 TABLET | Refills: 0 | Status: SHIPPED | OUTPATIENT
Start: 2020-07-27 | End: 2021-11-22

## 2020-07-27 RX ORDER — EPINEPHRINE 0.3 MG/.3ML
INJECTION SUBCUTANEOUS
COMMUNITY
Start: 2019-01-03
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
## 2020-07-27 NOTE — PATIENT INSTRUCTIONS
1  Diagnostic polysomnogram  2  Follow diagnostic study by titration of nasal CPAP if warranted   3  Perform MSLT if no evidence of significant abnormal nocturnal breathing  4  Consider medication for wakefulness if warranted  5  Obtain old records from [facility]   6  Return after testing for review of results      Nursing Support:  When: Monday through Friday 7A-5PM except holidays  Where: Our direct line is [phone]  If you are having a true emergency please call 911  In the event that the line is busy or it is after hours please leave a voice message and we will return your call  Please speak clearly, leaving your full name, birth date, best number to reach you and the reason for your call  Medication refills: We will need the name of the medication, the dosage, the ordering provider, whether you get a 30 or 90 day refill, and the pharmacy name and address  Medications will be ordered by the provider only  Nurses cannot call in prescriptions  Please allow 7 days for medication refills  Physician requested updates: If your provider requested that you call with an update after starting medication, please be ready to provide us the medication and dosage, what time you take your medication, the time you attempt to fall asleep, time you fall asleep, when you wake up, and what time you get out of bed  Sleep Study Results: We will contact you with sleep study results and/or next steps after the physician has reviewed your testing

## 2020-07-27 NOTE — PROGRESS NOTES
Review of Systems      Genitourinary hot flashes at night   Cardiology none   Gastrointestinal frequent heartburn/acid reflux   Neurology frequent headaches, awaken with headache, need to move extremities, muscle weakness, forgetfulness, poor concentration or confusion, , difficulty with memory and balance problems   Constitutional fatigue, excessive sweating at night and weight change   Integumentary none   Psychiatry anxiety and depression   Musculoskeletal joint pain, muscle aches, back pain, sciatica and leg cramps   Pulmonary snoring   ENT ringing in ears   Endocrine excessive thirst   Hematological none

## 2020-10-08 ENCOUNTER — TELEPHONE (OUTPATIENT)
Dept: SLEEP CENTER | Facility: CLINIC | Age: 50
End: 2020-10-08

## 2021-03-15 ENCOUNTER — TELEPHONE (OUTPATIENT)
Dept: BARIATRICS | Facility: CLINIC | Age: 51
End: 2021-03-15

## 2021-03-15 NOTE — TELEPHONE ENCOUNTER
Called pt letting her know that she must keep this appointment scheduled for May 25th ,2021  If she cancels or no-shows, we will be unable to reschedule her for surgery in this office
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
## 2021-05-25 ENCOUNTER — CLINICAL SUPPORT (OUTPATIENT)
Dept: BARIATRICS | Facility: CLINIC | Age: 51
End: 2021-05-25

## 2021-05-25 VITALS
DIASTOLIC BLOOD PRESSURE: 80 MMHG | TEMPERATURE: 96 F | BODY MASS INDEX: 37.97 KG/M2 | SYSTOLIC BLOOD PRESSURE: 110 MMHG | HEIGHT: 66 IN | RESPIRATION RATE: 20 BRPM | HEART RATE: 98 BPM | WEIGHT: 236.3 LBS

## 2021-05-25 DIAGNOSIS — K21.9 GERD (GASTROESOPHAGEAL REFLUX DISEASE): ICD-10-CM

## 2021-05-25 DIAGNOSIS — Z01.818 PRE-OPERATIVE CLEARANCE: Primary | ICD-10-CM

## 2021-05-25 DIAGNOSIS — I10 ESSENTIAL HYPERTENSION: ICD-10-CM

## 2021-05-25 DIAGNOSIS — E11.69 DIABETES MELLITUS TYPE 2 IN OBESE (HCC): ICD-10-CM

## 2021-05-25 DIAGNOSIS — E66.9 OBESITY, CLASS II, BMI 35-39.9: ICD-10-CM

## 2021-05-25 DIAGNOSIS — E66.01 MORBID OBESITY (HCC): Primary | ICD-10-CM

## 2021-05-25 DIAGNOSIS — E66.9 DIABETES MELLITUS TYPE 2 IN OBESE (HCC): ICD-10-CM

## 2021-05-25 PROCEDURE — RECHECK: Performed by: DIETITIAN, REGISTERED
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
## 2021-06-16 ENCOUNTER — OFFICE VISIT (OUTPATIENT)
Dept: BARIATRICS | Facility: CLINIC | Age: 51
End: 2021-06-16

## 2021-06-16 VITALS — WEIGHT: 235 LBS | BODY MASS INDEX: 37.77 KG/M2 | HEIGHT: 66 IN

## 2021-06-16 DIAGNOSIS — E66.9 OBESITY, CLASS II, BMI 35-39.9: Primary | ICD-10-CM

## 2021-06-16 PROCEDURE — RECHECK: Performed by: DIETITIAN, REGISTERED
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
## 2021-07-14 ENCOUNTER — OFFICE VISIT (OUTPATIENT)
Dept: BARIATRICS | Facility: CLINIC | Age: 51
End: 2021-07-14
Payer: COMMERCIAL

## 2021-07-14 VITALS
SYSTOLIC BLOOD PRESSURE: 100 MMHG | TEMPERATURE: 97.1 F | WEIGHT: 233 LBS | HEART RATE: 93 BPM | DIASTOLIC BLOOD PRESSURE: 85 MMHG | BODY MASS INDEX: 37.45 KG/M2 | HEIGHT: 66 IN

## 2021-07-14 DIAGNOSIS — K21.9 GERD (GASTROESOPHAGEAL REFLUX DISEASE): Primary | ICD-10-CM

## 2021-07-14 DIAGNOSIS — E66.9 DIABETES MELLITUS TYPE 2 IN OBESE (HCC): ICD-10-CM

## 2021-07-14 DIAGNOSIS — E11.69 DIABETES MELLITUS TYPE 2 IN OBESE (HCC): ICD-10-CM

## 2021-07-14 PROCEDURE — 99204 OFFICE O/P NEW MOD 45 MIN: CPT | Performed by: SURGERY

## 2021-07-14 RX ORDER — CALCIUM CARBONATE-CHOLECALCIFEROL TAB 250 MG-125 UNIT 250-125 MG-UNIT
1 TAB ORAL
COMMUNITY
End: 2021-11-22

## 2021-07-14 NOTE — PROGRESS NOTES
BARIATRIC CONSULT-INITIAL - BARIATRIC SURGERY  Marva Salgado 45 y o  female MRN: [id number]  Unit/Bed#:  Encounter: [id number]      HPI:  Primitivo Sawant is a 45 y o  female who presents with morbid obesity to discuss weight loss options  Review of Systems    Historical Information   Past Medical History:   Diagnosis Date    Asthma     Diabetes mellitus ([address] )     Hypertension     Tachycardia      Past Surgical History:   Procedure Laterality Date    DISCECTOMY SPINE CERVICAL POSTERIOR      HYSTERECTOMY      NECK SURGERY      REPLACEMENT TOTAL KNEE      SHOULDER OPEN ROTATOR CUFF REPAIR      SPINAL CORD STIMULATOR IMPLANT       Social History   Social History     Substance and Sexual Activity   Alcohol Use Yes    Comment: once a year     Social History     Substance and Sexual Activity   Drug Use No     Social History     Tobacco Use   Smoking Status Never Smoker   Smokeless Tobacco Never Used     Family History: non-contributory    Meds/Allergies   all medications and allergies reviewed  Allergies   Allergen Reactions    Iodine - Food Allergy Anaphylaxis     Other reaction(s): anaphylactic shock  Other reaction(s): anaphylactic shock  Patient states only seafood allergy  Injected 6/30 with no pre-meds and no reaction- js      Latex Hives    Other Hives     Paper tape okay    Percocet [Oxycodone-Acetaminophen]     Shellfish Allergy - Food Allergy     Wellbutrin [Bupropion]        Objective       Current Vitals:   Blood Pressure: 100/85 (07/14/21 1509)  Pulse: 93 (07/14/21 1509)  Temperature: (!) 97 1 °F (36 2 °C) (07/14/21 1509)  Temp Source: Tympanic (07/14/21 1509)  Height: 5' 6 1" (167 9 cm) (07/14/21 1509)  Weight - Scale: 106 kg (233 lb) (07/14/21 1509)  Body mass index is 37 49 kg/m²  Invasive Devices     None                 Physical Exam    Lab Results: I have personally reviewed pertinent lab results  Imaging: I have personally reviewed pertinent reports      EKG, Pathology, and Other Studies: I have personally reviewed pertinent reports  Code Status: [unfilled]  Advance Directive and Living Will:      Power of :    POLST:      Assessment/PLAN:            Patient has a long history of morbid obesity and is presenting to discuss the surgical weight loss options  Despite the patient best efforts patient was unable to lose any meaningful or sustainable weight using nonsurgical means  We had a long discussion regarding all the surgical weight-loss options at our disposal at this point and reviewed the risks and benefits of each procedure in details as it relates to her age, BMI and medical conditions  Patient elected to undergo Sleeve vs RYGB IN ADDITION TO PEH REPAIR  HISTORY OF PE SO PATIENT WILL NEED POSTOPERATIVE LOVENOX  Risks and benefits were explained to the patient  We also discussed the importance and need of a preoperative workup to make sure that the patient can undergo the procedure safely  Preoperative workup includes sleep apnea screening, cardiac evaluation, nutrition/psych and preoperative EGD  Risks and benefits of all the preoperative diagnostic tests were discussed with the patient including but not limited to the upper endoscopy  Alternatives to surgery and alternative forms of surgery were also explained  Postsurgical commitment and aftercare programs were discussed and explained to the patient in details   In terms of comorbidities patient suffers mostly of   Past Medical History:   Diagnosis Date    Asthma     [address] mellitus [address] [address]     Hypertension     Tachycardia        I informed the patient that the rate of resolution of comorbid conditions following weight loss surgery is between 60 and 90% depending on the severity of the specific medical condition  I discussed and educated the patient regarding the different components of our multidisciplinary program and the importance of compliance and follow-up in the postoperative period   All questions answered  Patient understands risks and benefits  A videotape of the procedure was also shown to the patient  After showing the video we discussed all the technical aspects of the procedure and also the potential complications including but not limited to gastrointestinal perforation, leak, obstruction, stricture and hemorrhage  I spent 45 min with the patient more than 50% of the time was spent educating the patient and coordinating care

## 2021-08-02 ENCOUNTER — PREP FOR PROCEDURE (OUTPATIENT)
Dept: BARIATRICS | Facility: CLINIC | Age: 51
End: 2021-08-02

## 2021-08-02 DIAGNOSIS — E66.01 MORBID OBESITY (HCC): Primary | ICD-10-CM

## 2021-08-17 ENCOUNTER — TELEPHONE (OUTPATIENT)
Dept: GASTROENTEROLOGY | Facility: HOSPITAL | Age: 51
End: 2021-08-17

## 2021-08-18 ENCOUNTER — ANESTHESIA EVENT (OUTPATIENT)
Dept: GASTROENTEROLOGY | Facility: HOSPITAL | Age: 51
End: 2021-08-18

## 2021-08-18 ENCOUNTER — ANESTHESIA (OUTPATIENT)
Dept: GASTROENTEROLOGY | Facility: HOSPITAL | Age: 51
End: 2021-08-18

## 2021-08-18 ENCOUNTER — HOSPITAL ENCOUNTER (OUTPATIENT)
Dept: GASTROENTEROLOGY | Facility: HOSPITAL | Age: 51
Setting detail: OUTPATIENT SURGERY
Discharge: HOME | End: 2021-08-18
Attending: SURGERY
Payer: COMMERCIAL

## 2021-08-18 VITALS
HEIGHT: 66 IN | DIASTOLIC BLOOD PRESSURE: 66 MMHG | BODY MASS INDEX: 37.45 KG/M2 | HEART RATE: 74 BPM | RESPIRATION RATE: 14 BRPM | SYSTOLIC BLOOD PRESSURE: 113 MMHG | OXYGEN SATURATION: 97 % | TEMPERATURE: 98.1 F | WEIGHT: 233 LBS

## 2021-08-18 DIAGNOSIS — E66.01 MORBID OBESITY (HCC): ICD-10-CM

## 2021-08-18 PROBLEM — E66.812 OBESITY, CLASS II, BMI 35-39.9: Status: ACTIVE | Noted: 2021-08-18

## 2021-08-18 PROBLEM — I10 ESSENTIAL HYPERTENSION: Status: ACTIVE | Noted: 2021-08-18

## 2021-08-18 PROBLEM — E66.9 OBESITY, CLASS II, BMI 35-39.9: Status: ACTIVE | Noted: 2021-08-18

## 2021-08-18 PROBLEM — J45.909 ASTHMA: Status: ACTIVE | Noted: 2021-08-18

## 2021-08-18 LAB — GLUCOSE SERPL-MCNC: 133 MG/DL (ref 65–140)

## 2021-08-18 PROCEDURE — 88305 TISSUE EXAM BY PATHOLOGIST: CPT | Performed by: PATHOLOGY

## 2021-08-18 PROCEDURE — 43239 EGD BIOPSY SINGLE/MULTIPLE: CPT | Performed by: SURGERY

## 2021-08-18 PROCEDURE — 82948 REAGENT STRIP/BLOOD GLUCOSE: CPT

## 2021-08-18 RX ORDER — SODIUM CHLORIDE 9 MG/ML
125 INJECTION, SOLUTION INTRAVENOUS CONTINUOUS
Status: DISCONTINUED | OUTPATIENT
Start: 2021-08-18 | End: 2021-08-22 | Stop reason: HOSPADM

## 2021-08-18 RX ORDER — PROPOFOL 10 MG/ML
INJECTION, EMULSION INTRAVENOUS AS NEEDED
Status: DISCONTINUED | OUTPATIENT
Start: 2021-08-18 | End: 2021-08-18

## 2021-08-18 RX ADMIN — PROPOFOL 30 MG: 10 INJECTION, EMULSION INTRAVENOUS at 10:25

## 2021-08-18 RX ADMIN — PROPOFOL 140 MG: 10 INJECTION, EMULSION INTRAVENOUS at 10:22

## 2021-08-18 RX ADMIN — SODIUM CHLORIDE 125 ML/HR: 0.9 INJECTION, SOLUTION INTRAVENOUS at 10:09

## 2021-08-18 RX ADMIN — PROPOFOL 30 MG: 10 INJECTION, EMULSION INTRAVENOUS at 10:28

## 2021-08-18 NOTE — H&P
H&P EXAM - Outpatient Endoscopy  [facility] GI LAB INTRA   [address] 45 y o  female MRN: [id number]  Unit/Bed#:  Encounter: [id number]        Impression: Morbid obesity    Plan:Upper endoscopy and a biopsy to rule out H  Pylori    Chief Complaint: Morbid obesity and preoperative endoscopy    Physical Exam: Normal not in acute distress   Chest: Clear to auscultation   Heart: Normal S1 and S2

## 2021-08-18 NOTE — INTERVAL H&P NOTE
H&P reviewed  After examining the patient I find no changes in the patients condition since the H&P had been written      Vitals:    08/18/21 0948   BP: 121/77   Pulse: 80   Resp: 18   Temp: 98 1 °F (36 7 °C)   SpO2: 95%

## 2021-08-18 NOTE — ANESTHESIA PREPROCEDURE EVALUATION
Procedure:  EGD    Relevant Problems   CARDIO   (+) Essential hypertension      NEURO/PSYCH   (+) Paresthesia      PULMONARY   (+) Asthma      Other   (+) Obesity, Class II, BMI 35-39 9        Physical Exam    Airway    Mallampati score: III  TM Distance: >3 FB  Neck ROM: full     Dental       Cardiovascular  Rhythm: regular, Rate: normal, Cardiovascular exam normal    Pulmonary  Pulmonary exam normal Breath sounds clear to auscultation,     Other Findings        Anesthesia Plan  ASA Score- 3     Anesthesia Type- general with ASA Monitors  Additional Monitors:   Airway Plan:           Plan Factors-Exercise tolerance (METS): <4 METS  Chart reviewed  Existing labs reviewed  Patient is not a current smoker  Obstructive sleep apnea risk education given perioperatively  Induction- intravenous  Postoperative Plan-     Informed Consent- Anesthetic plan and risks discussed with patient

## 2021-08-18 NOTE — DISCHARGE INSTRUCTIONS
Upper Endoscopy   WHAT YOU NEED TO KNOW:   An upper endoscopy is also called an upper gastrointestinal (GI) endoscopy, or an esophagogastroduodenoscopy (EGD)  You may feel bloated, gassy, or have some abdominal discomfort after your procedure  Your throat may be sore for 24 to 36 hours  You may burp or pass gas from air that is still inside your body  DISCHARGE INSTRUCTIONS:   Call 911 for any of the following:   · You have sudden chest pain or trouble breathing  Seek care immediately if:   · You feel dizzy or faint  · You have trouble swallowing  · Your bowel movements are very dark or black  · Your abdomen is hard and firm and you have severe pain  · You vomit blood  Contact your healthcare provider if:   · You feel full or bloated and cannot burp or pass gas  · You have not had a bowel movement for 3 days after your procedure  · You have neck pain  · You have a fever or chills  · You have nausea or are vomiting  · You have a rash or hives  · You have questions or concerns about your endoscopy  Relieve a sore throat:  Suck on throat lozenges or crushed ice  Gargle with a small amount of warm salt water  Mix 1 teaspoon of salt and 1 cup of warm water to make salt water  Relieve gas and discomfort from bloating:  Lie on your right side with a heating pad on your abdomen  Take short walks to help pass gas  Eat small meals until bloating is relieved  Rest after your procedure: You have been given medicine to relax you  Do not  drive or make important decisions until the day after your procedure  Return to your normal activity as directed  You can usually return to work the day after your procedure  Follow up with your healthcare provider as directed:  Write down your questions so you remember to ask them during your visits     © 2017 [address] is for End User's use only and may not be sold, redistributed or otherwise used for commercial purposes  All illustrations and images included in CareNotes® are the copyrighted property of [employer]  or [employer]  The above information is an  only  It is not intended as medical advice for individual conditions or treatments  Talk to your doctor, nurse or pharmacist before following any medical regimen to see if it is safe and effective for you  Hiatal Hernia   WHAT YOU NEED TO KNOW:   A hiatal hernia is a condition that causes part of your stomach to bulge through the hiatus (small opening) in your diaphragm  The part of the stomach may move up and down, or it may get trapped above the diaphragm  DISCHARGE INSTRUCTIONS:   Seek care immediately if:   · You have severe abdominal pain  · You try to vomit but nothing comes out (retching)  · You have severe chest pain and sudden trouble breathing  · Your bowel movements are black or bloody  · Your vomit looks like coffee grounds or has blood in it  Contact your healthcare provider if:   · Your symptoms are getting worse  · You have nausea, and you are vomiting  · You are losing weight without trying  · You have questions or concerns about your condition or care  Medicines:   · Medicines  may be given to relieve heartburn symptoms  These medicines help to decrease or block stomach acid  You may also be given medicines that help to tighten the esophageal sphincter  · Take your medicine as directed  Contact your healthcare provider if you think your medicine is not helping or if you have side effects  Tell him or her if you are allergic to any medicine  Keep a list of the medicines, vitamins, and herbs you take  Include the amounts, and when and why you take them  Bring the list or the pill bottles to follow-up visits  Carry your medicine list with you in case of an emergency      Follow up with your healthcare provider as directed:  Write down your questions so you remember to ask them during your visits  Self care:   · Avoid foods that make your symptoms worse  These may include spicy foods, fruit juices, alcohol, caffeine, chocolate, and mint  · Eat several small meals during the day  Small meals give your stomach less food to digest     · Avoid lying down and bending forward after you eat  Do not eat meals 2 to 3 hours before bedtime  This decreases your risk for reflux  · Maintain a healthy weight  If you are overweight, weight loss may help relieve your symptoms  · Sleep with your head elevated  at least 6 inches  · Do not smoke  Smoking can increase your symptoms of heartburn  © Copyright [employer] 2021 Information is for End User's use only and may not be sold, redistributed or otherwise used for commercial purposes  All illustrations and images included in CareNotes® are the copyrighted property of [employer]  or [facility] Claudia Franco   The above information is an  only  It is not intended as medical advice for individual conditions or treatments  Talk to your doctor, nurse or pharmacist before following any medical regimen to see if it is safe and effective for you

## 2021-08-18 NOTE — H&P (VIEW-ONLY)
H&P EXAM - Outpatient Endoscopy  [facility] [address] [facility] INTRA   Anderson Poisson [address] [facility] 45 y o  female MRN: [id number]  Unit/Bed#:  Encounter: [id number]        Impression: Morbid obesity    Plan:Upper endoscopy and a biopsy to rule out H  Pylori    Chief Complaint: Morbid obesity and preoperative endoscopy    Physical Exam: Normal not in acute distress   Chest: Clear to auscultation   Heart: Normal S1 and S2
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
## 2021-08-26 ENCOUNTER — OFFICE VISIT (OUTPATIENT)
Dept: BARIATRICS | Facility: CLINIC | Age: 51
End: 2021-08-26

## 2021-08-26 VITALS — HEIGHT: 66 IN | WEIGHT: 226 LBS | BODY MASS INDEX: 36.32 KG/M2

## 2021-08-26 DIAGNOSIS — E66.9 OBESITY, CLASS II, BMI 35-39.9: Primary | ICD-10-CM

## 2021-08-26 PROCEDURE — RECHECK: Performed by: DIETITIAN, REGISTERED

## 2021-09-17 ENCOUNTER — OFFICE VISIT (OUTPATIENT)
Dept: BARIATRICS | Facility: CLINIC | Age: 51
End: 2021-09-17
Payer: COMMERCIAL

## 2021-09-17 VITALS
SYSTOLIC BLOOD PRESSURE: 120 MMHG | TEMPERATURE: 96.7 F | WEIGHT: 224 LBS | HEIGHT: 66 IN | DIASTOLIC BLOOD PRESSURE: 78 MMHG | HEART RATE: 80 BPM | BODY MASS INDEX: 36 KG/M2

## 2021-09-17 DIAGNOSIS — E66.9 OBESITY, CLASS II, BMI 35-39.9: Primary | ICD-10-CM

## 2021-09-17 PROCEDURE — 99213 OFFICE O/P EST LOW 20 MIN: CPT | Performed by: SURGERY

## 2021-09-17 RX ORDER — CELECOXIB 200 MG/1
200 CAPSULE ORAL
COMMUNITY
Start: 2021-09-01 | End: 2021-11-22

## 2021-09-17 NOTE — PROGRESS NOTES
OFFICE VISIT - BARIATRIC SURGERY  Marva D [address] [place] [address] 45 y o  female MRN: [id number]  Unit/Bed#:  Encounter: [id number]      HPI:  Ambika Reed is a 45 y o  female here to discuss preoperative EGD results  Patient is doing well  She does note that she has daily GERD and reflux symptoms that she is taking Nexium for  She states she has been on this medication for years  Her EGD showed a medium sized hiatal hernia  No ulcers  Patient is interested in pursuing a RYGB with PEH repair in order to help with the GERD and weight loss    Review of Systems   Constitutional: Negative for chills and fever  HENT: Negative for ear pain and sore throat  Eyes: Negative for pain and visual disturbance  Respiratory: Negative for cough and shortness of breath  Cardiovascular: Negative for chest pain and palpitations  Gastrointestinal: Negative for abdominal pain and vomiting  +GERD/reflux   Genitourinary: Negative for dysuria and hematuria  Musculoskeletal: Negative for arthralgias and back pain  Skin: Negative for color change and rash  Neurological: Negative for seizures and syncope  All other systems reviewed and are negative        Historical Information   Past Medical History:   Diagnosis Date    Asthma     Diabetes mellitus ([address] )     Hypertension     Tachycardia      Past Surgical History:   Procedure Laterality Date    DISCECTOMY SPINE CERVICAL POSTERIOR      HYSTERECTOMY      NECK SURGERY      REPLACEMENT TOTAL KNEE      SHOULDER OPEN ROTATOR CUFF REPAIR      SPINAL CORD STIMULATOR IMPLANT       Social History   Social History     Substance and Sexual Activity   Alcohol Use Yes    Comment: once a year     Social History     Substance and Sexual Activity   Drug Use No     Social History     Tobacco Use   Smoking Status Never Smoker   Smokeless Tobacco Never Used     Family History: non-contributory    Meds/Allergies   all medications and allergies reviewed  Allergies   Allergen Reactions    Iodine - Food Allergy Anaphylaxis     Other reaction(s): anaphylactic shock  Other reaction(s): anaphylactic shock  Patient states only seafood allergy  Injected 6/30 with no pre-meds and no reaction- js      Latex Hives    Other Hives     Paper tape okay    Percocet [Oxycodone-Acetaminophen]     Shellfish Allergy - Food Allergy     Wellbutrin [Bupropion]        Objective       Current Vitals:   Blood Pressure: 120/78 (09/17/21 1325)  Pulse: 80 (09/17/21 1325)  Temperature: (!) 96 7 °F (35 9 °C) (09/17/21 1325)  Temp Source: Tympanic (09/17/21 1325)  Height: 5' 6 1" (167 9 cm) (09/17/21 1325)  Weight - Scale: 102 kg (224 lb) (09/17/21 1325)      Invasive Devices     None                 Physical Exam        Assessment/PLAN:    45 y o  female here to discuss preoperative EGD results, which showed a medium sized hiatal hernia  The pathology report was reviewed with the patient and the results were within normal limits  Pathology, and Other Studies: I have personally reviewed pertinent reports        Will plan to schedule for robotic RYGB with PEH repair  Please contact the office with any questions or concerns        Glen Sanchez MD  Bariatric Surgery   9/17/2021  3:21 PM

## 2021-10-13 ENCOUNTER — OFFICE VISIT (OUTPATIENT)
Dept: BARIATRICS | Facility: CLINIC | Age: 51
End: 2021-10-13

## 2021-10-13 VITALS — BODY MASS INDEX: 35.48 KG/M2 | WEIGHT: 220.5 LBS

## 2021-10-13 DIAGNOSIS — E66.9 OBESITY, CLASS I, BMI 30-34.9: Primary | ICD-10-CM

## 2021-10-13 PROCEDURE — RECHECK

## 2021-11-11 ENCOUNTER — CLINICAL SUPPORT (OUTPATIENT)
Dept: BARIATRICS | Facility: CLINIC | Age: 51
End: 2021-11-11

## 2021-11-11 ENCOUNTER — OFFICE VISIT (OUTPATIENT)
Dept: BARIATRICS | Facility: CLINIC | Age: 51
End: 2021-11-11
Payer: COMMERCIAL

## 2021-11-11 VITALS
SYSTOLIC BLOOD PRESSURE: 140 MMHG | TEMPERATURE: 96.7 F | HEART RATE: 104 BPM | HEIGHT: 66 IN | WEIGHT: 221 LBS | BODY MASS INDEX: 35.52 KG/M2 | DIASTOLIC BLOOD PRESSURE: 100 MMHG

## 2021-11-11 DIAGNOSIS — E66.9 OBESITY, CLASS II, BMI 35-39.9: Primary | ICD-10-CM

## 2021-11-11 PROCEDURE — RECHECK: Performed by: DIETITIAN, REGISTERED

## 2021-11-11 PROCEDURE — 99213 OFFICE O/P EST LOW 20 MIN: CPT | Performed by: SURGERY

## 2021-11-11 NOTE — H&P (VIEW-ONLY)
BARIATRIC HISTORY AND PHYSICAL - BARIATRIC SURGERY  Marva Salgado 45 y o  female MRN: [id number]  Unit/Bed#:  Encounter: [id number]      HPI:  Arianna Wahl is a 45 y o  female who presents to review their preoperative workup and see if they are a good candidate to undergo a bariatric procedure  Patient notes having left rotator cuff surgery in August and is currently in a sling  Plans to have total shoulder replacement on December 30th 2021  Patient also notes severe abdominal pain with everything she eats and needing to eat high carb diet  Also notes 30lb weight loss without trying over the last month or so that she "needs to eat bad things" to keep weight up and chronic diarrhea alternating with constipation over the last 3 months  Patient has had recent colonoscopy and workup which was negative and plans to see GI again but her Doctor, Dr Jennifer Cordoba fell and is not currently practicing  Hx of IBD  She also notes history of CHIDI but never got sleepy study done after positive STOPBANG as she states insurance didn't cover it  Bariatric STOP BANG (4/8)    [address] Name 05/25/21 1500       Stop Bang    Do you snore loudly (louder than talking or loud enough to be heard through closed doors)? 1- Yes  -IM       Do you often feel tired, fatigued, or sleepy during daytime? 1- Yes  -IM       Has anyone observed you stop breathing during your sleep? 0- No  -IM       Do you have or are you being treated for High Blood Pressure? 1- Yes  -IM       Body Mass Index greater than 35 kg/m2? 1- Yes  -IM       Age over 53yr old? 0- No  -IM       Neck circumference greater than 40cm? 0-No  -IM       Are you male? 0- No  -IM       Score 4  -IM       Needs sleep eval?  1- Yes  -IM           Review of Systems   Constitutional: Negative for chills and fever  HENT: Negative for ear pain and sore throat  Eyes: Negative for pain and visual disturbance  Respiratory: Negative for cough and shortness of breath  Cardiovascular: Negative for chest pain and palpitations  Gastrointestinal: Positive for abdominal pain and diarrhea  Negative for vomiting  Genitourinary: Negative for dysuria and hematuria  Musculoskeletal: Positive for arthralgias  Negative for back pain  Skin: Negative for color change and rash  Neurological: Negative for seizures and syncope  All other systems reviewed and are negative  Historical Information   Past Medical History:   Diagnosis Date    Asthma     Diabetes mellitus ([address] )     Hypertension     Tachycardia      Past Surgical History:   Procedure Laterality Date    DISCECTOMY SPINE CERVICAL POSTERIOR      HYSTERECTOMY      NECK SURGERY      REPLACEMENT TOTAL KNEE      SHOULDER OPEN ROTATOR CUFF REPAIR      SPINAL CORD STIMULATOR IMPLANT       Social History   Social History     Substance and Sexual Activity   Alcohol Use Yes    Comment: once a year     Social History     Substance and Sexual Activity   Drug Use No     Social History     Tobacco Use   Smoking Status Never Smoker   Smokeless Tobacco Never Used     Family History:   Family History   Problem Relation Age of Onset    Arthritis Mother     Asthma Mother     Depression Mother     Heart disease Father     Diabetes Father     Diabetes Sister     Heart disease Paternal Uncle     Heart disease Paternal Grandfather        Meds/Allergies   all medications and allergies reviewed  Allergies   Allergen Reactions    Iodine - Food Allergy Anaphylaxis     Other reaction(s): anaphylactic shock  Other reaction(s): anaphylactic shock  Patient states only seafood allergy   Injected 6/30 with no pre-meds and no reaction- js      Latex Hives    Other Hives     Paper tape okay    Percocet [Oxycodone-Acetaminophen]     Shellfish Allergy - Food Allergy     Wellbutrin [Bupropion]        Objective     Current Vitals:   Blood Pressure: 140/100 (11/11/21 1328)  Pulse: 104 (11/11/21 1328)  Temperature: (!) 96 7 °F (35 9 °C) (11/11/21 1328)  Temp Source: Tympanic (11/11/21 1328)  Height: 5' 6 1" (167 9 cm) (11/11/21 1328)  Weight - Scale: 100 kg (221 lb) (11/11/21 1328)  bowel movement  [unfilled]    Invasive Devices  Report    None                 Physical Exam  Constitutional:       Appearance: Normal appearance  She is obese  HENT:      Head: Normocephalic and atraumatic  Nose: Nose normal       Mouth/Throat:      Mouth: Mucous membranes are moist    Eyes:      Extraocular Movements: Extraocular movements intact  Pupils: Pupils are equal, round, and reactive to light  Cardiovascular:      Rate and Rhythm: Normal rate and regular rhythm  Pulses: Normal pulses  Heart sounds: Normal heart sounds  Pulmonary:      Effort: Pulmonary effort is normal       Breath sounds: Normal breath sounds  Abdominal:      Palpations: Abdomen is soft  Comments: No rashes    Musculoskeletal:         General: Signs of injury present  Cervical back: Normal range of motion  Comments: Left shoulder in sling   Skin:     General: Skin is warm  Neurological:      General: No focal deficit present  Mental Status: She is alert and oriented to person, place, and time  Psychiatric:         Mood and Affect: Mood normal          Behavior: Behavior normal          Lab Results: I have personally reviewed pertinent lab results  Imaging: I have personally reviewed pertinent reports  EKG, Pathology, and Other Studies: I have personally reviewed pertinent reports  [address] [facility]  [place] [id number] [phone]        DATE OF SERVICE:  8/18/21     PHYSICIAN(S):  Yesica Cary MD - Attending Physician        INDICATION:  Morbid obesity ([address] )     POST-OP DIAGNOSIS:  See the impression below      PREPROCEDURE:  Informed consent was obtained for the procedure, including sedation  Risks of perforation, hemorrhage, adverse drug reaction and aspiration were discussed  The patient was placed in the left lateral decubitus position      Patient was explained about the risks and benefits of the procedure  Risks including but not limited to bleeding, infection, and perforation were explained in detail  Also explained about less than 100% sensitivity with the exam and other alternatives      DETAILS OF PROCEDURE:  Patient was taken to the procedure room where a time out was performed to confirm correct patient and correct procedure  The patient underwent monitored anesthesia care, which was administered by an anesthesia professional  The patient's blood pressure, heart rate, level of consciousness, respirations and oxygen were monitored throughout the procedure  The scope was advanced to the second part of the duodenum  Retroflexion was performed in the fundus  The patient experienced no blood loss  The procedure was not difficult  The patient tolerated the procedure well  There were no apparent complications       ANESTHESIA INFORMATION:  ASA: III  Anesthesia Type: General     MEDICATIONS:  No administrations occurring from 1017 to 1030 on 08/18/21         FINDINGS:  · Medium sliding hiatal hernia (type I hiatal hernia) without [place] Labella lesions present  · Performed single biopsy to rule out H  pylori in the stomach  · The esophagus, stomach, duodenal bulb, 1st part of the duodenum and 2nd part of the duodenum appeared normal         SPECIMENS:  ID Type Source Tests Collected by Time Destination   1 : r/o h pylori Tissue Stomach TISSUE EXAM Olivier Ritter MD 8/18/2021 1029              IMPRESSION:  Medium-size hiatal hernia otherwise normal findings     RECOMMENDATION:  There is no recommended follow-up for this procedure  Olivier Ritter MD    Final Diagnosis   A  Stomach, biopsy:     - Chronic inactive antral gastritis  - No H  pylori organisms identified on H&E stained sections      - No intestinal metaplasia, dysplasia or neoplasia identified            Final Diagnosis A  Duodenum, third portion, biopsy:               - Small intestinal mucosa with no significant histologic abnormality  B  Stomach, antrum, biopsy:               - Mild chronic inactive gastritis  C  Stomach, body, polyp, biopsy:               - Fundic gland polyp  D  Gastroesophageal junction, biopsy:               - Squamous epithelium with chronic inflammation   - Rare minute detached strips of glandular cells; no intestinal metaplasia or dysplasia identified  E  Ileum, biopsy:               - Small intestinal mucosa with no significant histologic abnormality  [place], biopsy:               - Colonic mucosa with no significant histologic abnormality   - No acute, chronic or microscopic colitis identified  G  Colon, right/ascending, biopsy:               - Colonic mucosa with no significant histologic abnormality   - No acute, chronic or microscopic colitis identified        H  Colon, transverse, biopsy:               - Colonic mucosa with no significant histologic abnormality   - No acute, chronic or microscopic colitis identified       I  Colon, splenic flexure, polyp, biopsy:               - Hyperplastic polyp  J  Colon, sigmoid, biopsy:               - Colonic mucosa with no significant histologic abnormality   - No acute, chronic or microscopic colitis identified  K  Rectum, biopsy:               - Colonic mucosa with no significant histologic abnormality   - No acute, chronic or microscopic colitis identified               Code Status: [unfilled]  Advance Directive and Living Will:      Power of :    POLST:        Assessment/Plan:    The patient presented to review the preoperative workup and see if bariatric surgery is appropriate and indicated following the extensive preoperative workup and the enrollment in our weight loss program   Preoperative workup was complete   Results were reviewed with the patient including the blood work results and the endoscopy findings and the biopsy results  We also reviewed the cardiology evaluation  The patient was determined to be a good candidate for Laparoscopic RNYGB with Robotic Assist with possible hernia repair  Patient's STOPBANG is 4/8 and never obtained sleep study as she states [employer] did not cover, but does have medicare as secondary, patient will need to reschedule surgery if she cannot get sleep study performed and CPAP if warranted  Notes from 8/11/21 encounter state polysomnography auth request submitted on line via [employer] and request Nicole Hacking Ref #[id number], [place] #[id number] - eff 08/04/21 - 11/02/21  Katherine Orozcoburtpe is going to call patient and let her know that patient needs to call pulmonology and resubmit for sleep study and complete  If CPAP needed and cannot be obtained by surgery date, she will need to be rescheduled  ----------------------------------------------------------------------  Smoking: Denies  Blood thinner use: Denies  Home pain medication use and who manages it: Denies  CPAP use: Hx COPD but insurance didn't cover sleep study (If yes, sleep study obtained and reminded pt to bring CPAP to hospital)  History of blood clots: Yes, neck, lung, placenta, last blood clot 10 years ago  Previous abdominal surgeries: None  Cardiac clearance obtained: 7/29/21 Dr Mert Deleon, medium risk   Negative nuclear stress test   Negative CT coronary   EKG performed: 9/22/21 Sinus tachycardia with possible ischemia  EGD prior to surgery: 8/18/21 Medium [address] obtained (CBC, CMP): 10/6/21  H   Pylori status: Negative  Medication allergies: Latex, percocet (itchy, hives), tape, wellbutrin, shellfish, denies allergy to tylenol, patient notes that she usually takes dilaudid in hospital  SLIM consult Post-Op: Yes, HTN, bisoprolol  Reminded patient about 2 week pre-op liquid diet: N/A  10% body weight loss pre-operatively met/discussed: N/A  Consent signed: Yes  Pre-Op ERAS Orders placed: No, case will probably need to be rescheduled  -----------------------------------------------------------------------  Risks and benefits explained one more time to the patient  Alternatives to surgery and alternative forms of surgery were also explained  Post-surgical commitment and after care programs were explained  We also discussed that the Scint-X robot may be available to us to use on the day of the patient procedure and that the procedure may be performed robotically  I discussed in details the advantages and disadvantages of this approach including the potential decrease in postoperative pain because of the remote center technology  I also mentioned the lack of strong evidence for the use of robot in bariatric patients and the potential disadvantage to patients because of the prolonged operative time  Consent was signed  Questions were answered and concerns were addressed  Patient will need to start the 2 week liquid diet prior to surgery  Patient wishes to proceed  As per [address] [place] [address] guidelines, I had a discussion with the patient regarding their CODE STATUS in the perioperative period and the patient is level 1 or FULL CODE STATUS      Xavier Cruz PA-C  Bariatric Surgery   11/11/2021  1:46 PM

## 2021-11-12 ENCOUNTER — PREP FOR PROCEDURE (OUTPATIENT)
Dept: BARIATRICS | Facility: CLINIC | Age: 51
End: 2021-11-12

## 2021-11-22 ENCOUNTER — ANESTHESIA EVENT (OUTPATIENT)
Dept: PERIOP | Facility: HOSPITAL | Age: 51
DRG: 621 | End: 2021-11-22
Payer: COMMERCIAL

## 2021-11-22 DIAGNOSIS — E66.9 OBESITY, CLASS II, BMI 35-39.9: Primary | ICD-10-CM

## 2021-11-22 NOTE — PRE-PROCEDURE INSTRUCTIONS
Pre-Surgery Instructions:   Medication Instructions    albuterol (PROVENTIL HFA,VENTOLIN HFA) 90 mcg/act inhaler Instructed patient per Anesthesia Guidelines   bisoprolol (ZEBETA) 5 mg tablet Instructed patient per Anesthesia Guidelines   DULoxetine (CYMBALTA) 60 mg delayed release capsule Instructed patient per Anesthesia Guidelines   EPINEPHrine (EPIPEN) 0 3 mg/0 3 mL SOAJ Instructed patient per Anesthesia Guidelines   esomeprazole (NexIUM) 40 MG capsule Instructed patient per Anesthesia Guidelines   gabapentin (NEURONTIN) 300 mg capsule Instructed patient per Anesthesia Guidelines   metFORMIN (GLUCOPHAGE-XR) 500 mg 24 hr tablet Instructed patient per Anesthesia Guidelines  Instructed to take gabapentin am of surgery with sip of water per anesthesia guidelines   To use albuterol inhaler am of surgery if needed

## 2021-11-23 ENCOUNTER — TELEPHONE (OUTPATIENT)
Dept: BARIATRICS | Facility: CLINIC | Age: 51
End: 2021-11-23

## 2021-11-24 RX ORDER — OMEPRAZOLE 20 MG/1
20 CAPSULE, DELAYED RELEASE ORAL DAILY
Qty: 30 CAPSULE | Refills: 3 | Status: SHIPPED | OUTPATIENT
Start: 2021-11-24 | End: 2021-12-09 | Stop reason: SDUPTHER

## 2021-11-24 RX ORDER — OXYCODONE HYDROCHLORIDE 5 MG/1
5 TABLET ORAL EVERY 4 HOURS PRN
Qty: 10 TABLET | Status: CANCELLED | OUTPATIENT
Start: 2021-12-01

## 2021-11-30 ENCOUNTER — ANESTHESIA (OUTPATIENT)
Dept: PERIOP | Facility: HOSPITAL | Age: 51
DRG: 621 | End: 2021-11-30
Payer: COMMERCIAL

## 2021-11-30 ENCOUNTER — HOSPITAL ENCOUNTER (INPATIENT)
Facility: HOSPITAL | Age: 51
LOS: 1 days | Discharge: HOME | DRG: 621 | End: 2021-12-01
Attending: SURGERY | Admitting: SURGERY
Payer: COMMERCIAL

## 2021-11-30 DIAGNOSIS — E66.01 MORBIDLY OBESE (HCC): ICD-10-CM

## 2021-11-30 DIAGNOSIS — E11.9 DIABETES MELLITUS (HCC): ICD-10-CM

## 2021-11-30 DIAGNOSIS — I10 BENIGN ESSENTIAL HYPERTENSION: ICD-10-CM

## 2021-11-30 DIAGNOSIS — E66.9 LIFELONG OBESITY: ICD-10-CM

## 2021-11-30 DIAGNOSIS — I10 ESSENTIAL HYPERTENSION: Primary | ICD-10-CM

## 2021-11-30 DIAGNOSIS — K21.9 ESOPHAGEAL REFLUX: ICD-10-CM

## 2021-11-30 LAB
GLUCOSE SERPL-MCNC: 166 MG/DL (ref 65–140)
GLUCOSE SERPL-MCNC: 185 MG/DL (ref 65–140)

## 2021-11-30 PROCEDURE — 0BUT4JZ SUPPLEMENT DIAPHRAGM WITH SYNTHETIC SUBSTITUTE, PERCUTANEOUS ENDOSCOPIC APPROACH: ICD-10-PCS | Performed by: SURGERY

## 2021-11-30 PROCEDURE — C9290 INJ, BUPIVACAINE LIPOSOME: HCPCS | Performed by: SURGERY

## 2021-11-30 PROCEDURE — 43644 LAP GASTRIC BYPASS/ROUX-EN-Y: CPT | Performed by: SURGERY

## 2021-11-30 PROCEDURE — 0DJ08ZZ INSPECTION OF UPPER INTESTINAL TRACT, VIA NATURAL OR ARTIFICIAL OPENING ENDOSCOPIC: ICD-10-PCS | Performed by: SURGERY

## 2021-11-30 PROCEDURE — 43282 LAP PARAESOPH HER RPR W/MESH: CPT | Performed by: SURGERY

## 2021-11-30 PROCEDURE — 8E0W4CZ ROBOTIC ASSISTED PROCEDURE OF TRUNK REGION, PERCUTANEOUS ENDOSCOPIC APPROACH: ICD-10-PCS | Performed by: SURGERY

## 2021-11-30 PROCEDURE — 82948 REAGENT STRIP/BLOOD GLUCOSE: CPT

## 2021-11-30 PROCEDURE — 88302 TISSUE EXAM BY PATHOLOGIST: CPT | Performed by: PATHOLOGY

## 2021-11-30 PROCEDURE — C1781 MESH (IMPLANTABLE): HCPCS | Performed by: SURGERY

## 2021-11-30 PROCEDURE — 0D164ZA BYPASS STOMACH TO JEJUNUM, PERCUTANEOUS ENDOSCOPIC APPROACH: ICD-10-PCS | Performed by: SURGERY

## 2021-11-30 PROCEDURE — S2900 ROBOTIC SURGICAL SYSTEM: HCPCS | Performed by: SURGERY

## 2021-11-30 DEVICE — SEAMGUARD STPL REINF INTUITIVE SUREFORM 60 GREEN: Type: IMPLANTABLE DEVICE | Site: ABDOMEN | Status: FUNCTIONAL

## 2021-11-30 DEVICE — BIO-A TISSUE REINFORCEMENT 7CMX10CM
Type: IMPLANTABLE DEVICE | Site: ABDOMEN | Status: FUNCTIONAL
Brand: GORE BIO-A TISSUE REINFORCEMENT

## 2021-11-30 RX ORDER — SCOLOPAMINE TRANSDERMAL SYSTEM 1 MG/1
1 PATCH, EXTENDED RELEASE TRANSDERMAL ONCE
Status: CANCELLED | OUTPATIENT
Start: 2021-11-30 | End: 2021-11-30

## 2021-11-30 RX ORDER — DIPHENHYDRAMINE HCL 25 MG
25 TABLET ORAL EVERY 8 HOURS PRN
Status: DISCONTINUED | OUTPATIENT
Start: 2021-11-30 | End: 2021-12-01 | Stop reason: HOSPADM

## 2021-11-30 RX ORDER — CEFAZOLIN SODIUM 2 G/50ML
2000 SOLUTION INTRAVENOUS ONCE
Status: CANCELLED | OUTPATIENT
Start: 2021-11-30 | End: 2021-11-30

## 2021-11-30 RX ORDER — GLYCOPYRROLATE 0.2 MG/ML
INJECTION INTRAMUSCULAR; INTRAVENOUS AS NEEDED
Status: DISCONTINUED | OUTPATIENT
Start: 2021-11-30 | End: 2021-11-30

## 2021-11-30 RX ORDER — SODIUM CHLORIDE, SODIUM LACTATE, POTASSIUM CHLORIDE, CALCIUM CHLORIDE 600; 310; 30; 20 MG/100ML; MG/100ML; MG/100ML; MG/100ML
100 INJECTION, SOLUTION INTRAVENOUS CONTINUOUS
Status: DISCONTINUED | OUTPATIENT
Start: 2021-11-30 | End: 2021-12-01 | Stop reason: HOSPADM

## 2021-11-30 RX ORDER — GABAPENTIN 400 MG/1
1200 CAPSULE ORAL
Status: DISCONTINUED | OUTPATIENT
Start: 2021-11-30 | End: 2021-12-01 | Stop reason: HOSPADM

## 2021-11-30 RX ORDER — EPHEDRINE SULFATE 50 MG/ML
INJECTION INTRAVENOUS AS NEEDED
Status: DISCONTINUED | OUTPATIENT
Start: 2021-11-30 | End: 2021-11-30

## 2021-11-30 RX ORDER — ROCURONIUM BROMIDE 10 MG/ML
INJECTION, SOLUTION INTRAVENOUS AS NEEDED
Status: DISCONTINUED | OUTPATIENT
Start: 2021-11-30 | End: 2021-11-30

## 2021-11-30 RX ORDER — CELECOXIB 200 MG/1
200 CAPSULE ORAL ONCE
Status: CANCELLED | OUTPATIENT
Start: 2021-11-30 | End: 2021-11-30

## 2021-11-30 RX ORDER — ONDANSETRON 2 MG/ML
4 INJECTION INTRAMUSCULAR; INTRAVENOUS ONCE AS NEEDED
Status: COMPLETED | OUTPATIENT
Start: 2021-11-30 | End: 2021-11-30

## 2021-11-30 RX ORDER — PROMETHAZINE HYDROCHLORIDE 25 MG/ML
25 INJECTION, SOLUTION INTRAMUSCULAR; INTRAVENOUS EVERY 6 HOURS PRN
Status: DISCONTINUED | OUTPATIENT
Start: 2021-11-30 | End: 2021-12-01 | Stop reason: HOSPADM

## 2021-11-30 RX ORDER — ONDANSETRON 2 MG/ML
4 INJECTION INTRAMUSCULAR; INTRAVENOUS EVERY 6 HOURS PRN
Status: DISCONTINUED | OUTPATIENT
Start: 2021-11-30 | End: 2021-12-01 | Stop reason: HOSPADM

## 2021-11-30 RX ORDER — ALBUTEROL SULFATE 90 UG/1
2 AEROSOL, METERED RESPIRATORY (INHALATION) EVERY 4 HOURS PRN
Status: DISCONTINUED | OUTPATIENT
Start: 2021-11-30 | End: 2021-12-01 | Stop reason: HOSPADM

## 2021-11-30 RX ORDER — BISOPROLOL FUMARATE 5 MG/1
5 TABLET ORAL EVERY EVENING
Status: DISCONTINUED | OUTPATIENT
Start: 2021-11-30 | End: 2021-12-01 | Stop reason: HOSPADM

## 2021-11-30 RX ORDER — METOCLOPRAMIDE HYDROCHLORIDE 5 MG/ML
10 INJECTION INTRAMUSCULAR; INTRAVENOUS EVERY 6 HOURS PRN
Status: DISCONTINUED | OUTPATIENT
Start: 2021-11-30 | End: 2021-12-01 | Stop reason: HOSPADM

## 2021-11-30 RX ORDER — DULOXETIN HYDROCHLORIDE 60 MG/1
60 CAPSULE, DELAYED RELEASE ORAL EVERY EVENING
Status: DISCONTINUED | OUTPATIENT
Start: 2021-11-30 | End: 2021-12-01 | Stop reason: HOSPADM

## 2021-11-30 RX ORDER — SODIUM CHLORIDE, SODIUM LACTATE, POTASSIUM CHLORIDE, CALCIUM CHLORIDE 600; 310; 30; 20 MG/100ML; MG/100ML; MG/100ML; MG/100ML
INJECTION, SOLUTION INTRAVENOUS CONTINUOUS PRN
Status: DISCONTINUED | OUTPATIENT
Start: 2021-11-30 | End: 2021-11-30

## 2021-11-30 RX ORDER — LIDOCAINE HYDROCHLORIDE 10 MG/ML
INJECTION, SOLUTION EPIDURAL; INFILTRATION; INTRACAUDAL; PERINEURAL AS NEEDED
Status: DISCONTINUED | OUTPATIENT
Start: 2021-11-30 | End: 2021-11-30

## 2021-11-30 RX ORDER — SODIUM CHLORIDE 9 MG/ML
INJECTION, SOLUTION INTRAVENOUS AS NEEDED
Status: DISCONTINUED | OUTPATIENT
Start: 2021-11-30 | End: 2021-11-30 | Stop reason: HOSPADM

## 2021-11-30 RX ORDER — HYDRALAZINE HYDROCHLORIDE 20 MG/ML
5 INJECTION INTRAMUSCULAR; INTRAVENOUS ONCE
Status: COMPLETED | OUTPATIENT
Start: 2021-11-30 | End: 2021-11-30

## 2021-11-30 RX ORDER — SODIUM CHLORIDE 9 MG/ML
125 INJECTION, SOLUTION INTRAVENOUS CONTINUOUS
Status: DISCONTINUED | OUTPATIENT
Start: 2021-11-30 | End: 2021-12-01 | Stop reason: HOSPADM

## 2021-11-30 RX ORDER — MIDAZOLAM HYDROCHLORIDE 2 MG/2ML
INJECTION, SOLUTION INTRAMUSCULAR; INTRAVENOUS AS NEEDED
Status: DISCONTINUED | OUTPATIENT
Start: 2021-11-30 | End: 2021-11-30

## 2021-11-30 RX ORDER — ACETAMINOPHEN 325 MG/1
975 TABLET ORAL ONCE
Status: CANCELLED | OUTPATIENT
Start: 2021-11-30 | End: 2021-11-30

## 2021-11-30 RX ORDER — ONDANSETRON 2 MG/ML
INJECTION INTRAMUSCULAR; INTRAVENOUS AS NEEDED
Status: DISCONTINUED | OUTPATIENT
Start: 2021-11-30 | End: 2021-11-30

## 2021-11-30 RX ORDER — SUCCINYLCHOLINE/SOD CL,ISO/PF 100 MG/5ML
SYRINGE (ML) INTRAVENOUS AS NEEDED
Status: DISCONTINUED | OUTPATIENT
Start: 2021-11-30 | End: 2021-11-30

## 2021-11-30 RX ORDER — GABAPENTIN 300 MG/1
300 CAPSULE ORAL DAILY
Status: DISCONTINUED | OUTPATIENT
Start: 2021-11-30 | End: 2021-12-01 | Stop reason: HOSPADM

## 2021-11-30 RX ORDER — SCOLOPAMINE TRANSDERMAL SYSTEM 1 MG/1
1 PATCH, EXTENDED RELEASE TRANSDERMAL ONCE
Status: DISCONTINUED | OUTPATIENT
Start: 2021-11-30 | End: 2021-11-30 | Stop reason: HOSPADM

## 2021-11-30 RX ORDER — FENTANYL CITRATE/PF 50 MCG/ML
50 SYRINGE (ML) INJECTION
Status: DISCONTINUED | OUTPATIENT
Start: 2021-11-30 | End: 2021-11-30 | Stop reason: HOSPADM

## 2021-11-30 RX ORDER — FENTANYL CITRATE 50 UG/ML
INJECTION, SOLUTION INTRAMUSCULAR; INTRAVENOUS AS NEEDED
Status: DISCONTINUED | OUTPATIENT
Start: 2021-11-30 | End: 2021-11-30

## 2021-11-30 RX ORDER — CEFAZOLIN SODIUM 2 G/50ML
2000 SOLUTION INTRAVENOUS ONCE
Status: DISCONTINUED | OUTPATIENT
Start: 2021-11-30 | End: 2021-11-30 | Stop reason: ALTCHOICE

## 2021-11-30 RX ORDER — MORPHINE SULFATE 4 MG/ML
4 INJECTION, SOLUTION INTRAMUSCULAR; INTRAVENOUS EVERY 4 HOURS PRN
Status: DISCONTINUED | OUTPATIENT
Start: 2021-11-30 | End: 2021-12-01 | Stop reason: HOSPADM

## 2021-11-30 RX ORDER — BUPIVACAINE HYDROCHLORIDE 5 MG/ML
INJECTION, SOLUTION EPIDURAL; INTRACAUDAL AS NEEDED
Status: DISCONTINUED | OUTPATIENT
Start: 2021-11-30 | End: 2021-11-30 | Stop reason: HOSPADM

## 2021-11-30 RX ORDER — KETAMINE HYDROCHLORIDE 50 MG/ML
INJECTION, SOLUTION, CONCENTRATE INTRAMUSCULAR; INTRAVENOUS AS NEEDED
Status: DISCONTINUED | OUTPATIENT
Start: 2021-11-30 | End: 2021-11-30

## 2021-11-30 RX ORDER — NEOSTIGMINE METHYLSULFATE 1 MG/ML
INJECTION INTRAVENOUS AS NEEDED
Status: DISCONTINUED | OUTPATIENT
Start: 2021-11-30 | End: 2021-11-30

## 2021-11-30 RX ORDER — ACETAMINOPHEN 325 MG/1
975 TABLET ORAL ONCE
Status: COMPLETED | OUTPATIENT
Start: 2021-11-30 | End: 2021-11-30

## 2021-11-30 RX ORDER — CEFOXITIN SODIUM 2 G/50ML
2000 INJECTION, SOLUTION INTRAVENOUS ONCE
Status: COMPLETED | OUTPATIENT
Start: 2021-12-01 | End: 2021-12-01

## 2021-11-30 RX ORDER — HYDROMORPHONE HCL/PF 1 MG/ML
1 SYRINGE (ML) INJECTION EVERY 4 HOURS PRN
Status: DISCONTINUED | OUTPATIENT
Start: 2021-11-30 | End: 2021-12-01

## 2021-11-30 RX ORDER — HEPARIN SODIUM 5000 [USP'U]/ML
5000 INJECTION, SOLUTION INTRAVENOUS; SUBCUTANEOUS
Status: CANCELLED | OUTPATIENT
Start: 2021-11-30 | End: 2021-12-01

## 2021-11-30 RX ORDER — HEPARIN SODIUM 5000 [USP'U]/ML
5000 INJECTION, SOLUTION INTRAVENOUS; SUBCUTANEOUS
Status: COMPLETED | OUTPATIENT
Start: 2021-11-30 | End: 2021-11-30

## 2021-11-30 RX ORDER — GABAPENTIN 300 MG/1
300 CAPSULE ORAL ONCE
Status: COMPLETED | OUTPATIENT
Start: 2021-11-30 | End: 2021-11-30

## 2021-11-30 RX ORDER — PROPOFOL 10 MG/ML
INJECTION, EMULSION INTRAVENOUS AS NEEDED
Status: DISCONTINUED | OUTPATIENT
Start: 2021-11-30 | End: 2021-11-30

## 2021-11-30 RX ORDER — ALBUTEROL SULFATE 90 UG/1
AEROSOL, METERED RESPIRATORY (INHALATION) AS NEEDED
Status: DISCONTINUED | OUTPATIENT
Start: 2021-11-30 | End: 2021-11-30

## 2021-11-30 RX ORDER — HYDROCODONE BITARTRATE AND ACETAMINOPHEN 5; 325 MG/1; MG/1
1 TABLET ORAL EVERY 6 HOURS PRN
Status: DISCONTINUED | OUTPATIENT
Start: 2021-11-30 | End: 2021-12-01 | Stop reason: HOSPADM

## 2021-11-30 RX ORDER — CEFAZOLIN SODIUM 2 G/50ML
2000 SOLUTION INTRAVENOUS EVERY 8 HOURS
Status: CANCELLED | OUTPATIENT
Start: 2021-11-30 | End: 2021-12-01

## 2021-11-30 RX ORDER — GABAPENTIN 300 MG/1
300 CAPSULE ORAL ONCE
Status: CANCELLED | OUTPATIENT
Start: 2021-11-30 | End: 2021-11-30

## 2021-11-30 RX ORDER — SIMETHICONE 80 MG
80 TABLET,CHEWABLE ORAL 4 TIMES DAILY PRN
Status: DISCONTINUED | OUTPATIENT
Start: 2021-11-30 | End: 2021-12-01 | Stop reason: HOSPADM

## 2021-11-30 RX ORDER — ACETAMINOPHEN 160 MG/5ML
650 SUSPENSION, ORAL (FINAL DOSE FORM) ORAL EVERY 8 HOURS PRN
Status: DISCONTINUED | OUTPATIENT
Start: 2021-11-30 | End: 2021-12-01 | Stop reason: HOSPADM

## 2021-11-30 RX ORDER — DEXAMETHASONE SODIUM PHOSPHATE 10 MG/ML
INJECTION, SOLUTION INTRAMUSCULAR; INTRAVENOUS AS NEEDED
Status: DISCONTINUED | OUTPATIENT
Start: 2021-11-30 | End: 2021-11-30

## 2021-11-30 RX ORDER — CELECOXIB 200 MG/1
200 CAPSULE ORAL ONCE
Status: DISCONTINUED | OUTPATIENT
Start: 2021-11-30 | End: 2021-11-30

## 2021-11-30 RX ADMIN — SODIUM CHLORIDE: 0.9 INJECTION, SOLUTION INTRAVENOUS at 07:29

## 2021-11-30 RX ADMIN — SODIUM CHLORIDE, SODIUM LACTATE, POTASSIUM CHLORIDE, AND CALCIUM CHLORIDE: .6; .31; .03; .02 INJECTION, SOLUTION INTRAVENOUS at 08:59

## 2021-11-30 RX ADMIN — EPHEDRINE SULFATE 5 MG: 50 INJECTION, SOLUTION INTRAVENOUS at 07:59

## 2021-11-30 RX ADMIN — METOCLOPRAMIDE 10 MG: 5 INJECTION, SOLUTION INTRAMUSCULAR; INTRAVENOUS at 14:44

## 2021-11-30 RX ADMIN — CEFOXITIN SODIUM 2000 MG: 1 POWDER, FOR SOLUTION INTRAVENOUS at 17:55

## 2021-11-30 RX ADMIN — DULOXETINE HYDROCHLORIDE 60 MG: 60 CAPSULE, DELAYED RELEASE ORAL at 17:56

## 2021-11-30 RX ADMIN — GABAPENTIN 300 MG: 300 CAPSULE ORAL at 06:17

## 2021-11-30 RX ADMIN — ONDANSETRON 4 MG: 2 INJECTION INTRAMUSCULAR; INTRAVENOUS at 10:40

## 2021-11-30 RX ADMIN — ALBUTEROL SULFATE 2 PUFF: 90 AEROSOL, METERED RESPIRATORY (INHALATION) at 09:42

## 2021-11-30 RX ADMIN — SODIUM CHLORIDE, SODIUM LACTATE, POTASSIUM CHLORIDE, AND CALCIUM CHLORIDE 100 ML/HR: .6; .31; .03; .02 INJECTION, SOLUTION INTRAVENOUS at 12:40

## 2021-11-30 RX ADMIN — SODIUM CHLORIDE 125 ML/HR: 0.9 INJECTION, SOLUTION INTRAVENOUS at 06:16

## 2021-11-30 RX ADMIN — FENTANYL CITRATE 25 MCG: 50 INJECTION INTRAMUSCULAR; INTRAVENOUS at 11:12

## 2021-11-30 RX ADMIN — ONDANSETRON 4 MG: 2 INJECTION INTRAMUSCULAR; INTRAVENOUS at 09:20

## 2021-11-30 RX ADMIN — ALBUTEROL SULFATE 4 PUFF: 90 AEROSOL, METERED RESPIRATORY (INHALATION) at 09:22

## 2021-11-30 RX ADMIN — KETAMINE HYDROCHLORIDE 20 MG: 50 INJECTION INTRAMUSCULAR; INTRAVENOUS at 07:17

## 2021-11-30 RX ADMIN — HEPARIN SODIUM 5000 UNITS: 5000 INJECTION INTRAVENOUS; SUBCUTANEOUS at 06:17

## 2021-11-30 RX ADMIN — FAMOTIDINE 20 MG: 10 INJECTION INTRAVENOUS at 21:33

## 2021-11-30 RX ADMIN — FENTANYL CITRATE 50 MCG: 50 INJECTION INTRAMUSCULAR; INTRAVENOUS at 08:20

## 2021-11-30 RX ADMIN — ONDANSETRON 4 MG: 2 INJECTION INTRAMUSCULAR; INTRAVENOUS at 17:55

## 2021-11-30 RX ADMIN — Medication 150 MG: at 07:08

## 2021-11-30 RX ADMIN — FENTANYL CITRATE 25 MCG: 50 INJECTION INTRAMUSCULAR; INTRAVENOUS at 11:21

## 2021-11-30 RX ADMIN — ALBUTEROL SULFATE 4 PUFF: 90 AEROSOL, METERED RESPIRATORY (INHALATION) at 08:45

## 2021-11-30 RX ADMIN — BISOPROLOL FUMARATE 5 MG: 5 TABLET ORAL at 17:58

## 2021-11-30 RX ADMIN — PROMETHAZINE HYDROCHLORIDE 25 MG: 25 INJECTION INTRAMUSCULAR; INTRAVENOUS at 11:56

## 2021-11-30 RX ADMIN — HYDRALAZINE HYDROCHLORIDE 5 MG: 20 INJECTION INTRAMUSCULAR; INTRAVENOUS at 11:36

## 2021-11-30 RX ADMIN — TRIMETHOBENZAMIDE HYDROCHLORIDE 200 MG: 100 INJECTION INTRAMUSCULAR at 11:07

## 2021-11-30 RX ADMIN — DEXAMETHASONE SODIUM PHOSPHATE 4 MG: 10 INJECTION INTRAMUSCULAR; INTRAVENOUS at 07:13

## 2021-11-30 RX ADMIN — ROCURONIUM BROMIDE 50 MG: 50 INJECTION, SOLUTION INTRAVENOUS at 07:13

## 2021-11-30 RX ADMIN — MIDAZOLAM 2 MG: 1 INJECTION INTRAMUSCULAR; INTRAVENOUS at 06:59

## 2021-11-30 RX ADMIN — FENTANYL CITRATE 50 MCG: 50 INJECTION INTRAMUSCULAR; INTRAVENOUS at 09:27

## 2021-11-30 RX ADMIN — NEOSTIGMINE METHYLSULFATE 4 MG: 1 INJECTION INTRAVENOUS at 09:24

## 2021-11-30 RX ADMIN — PROPOFOL 200 MG: 10 INJECTION, EMULSION INTRAVENOUS at 07:07

## 2021-11-30 RX ADMIN — ROCURONIUM BROMIDE 30 MG: 50 INJECTION, SOLUTION INTRAVENOUS at 08:04

## 2021-11-30 RX ADMIN — GLYCOPYRROLATE 0.6 MG: 0.2 INJECTION, SOLUTION INTRAMUSCULAR; INTRAVENOUS at 09:24

## 2021-11-30 RX ADMIN — SODIUM CHLORIDE, SODIUM LACTATE, POTASSIUM CHLORIDE, AND CALCIUM CHLORIDE 100 ML/HR: .6; .31; .03; .02 INJECTION, SOLUTION INTRAVENOUS at 21:08

## 2021-11-30 RX ADMIN — ACETAMINOPHEN 975 MG: 325 TABLET, FILM COATED ORAL at 06:17

## 2021-11-30 RX ADMIN — CEFOXITIN SODIUM 2000 MG: 1 POWDER, FOR SOLUTION INTRAVENOUS at 07:05

## 2021-11-30 RX ADMIN — FENTANYL CITRATE 25 MCG: 50 INJECTION INTRAMUSCULAR; INTRAVENOUS at 11:27

## 2021-11-30 RX ADMIN — LIDOCAINE HYDROCHLORIDE 100 MG: 10 INJECTION, SOLUTION EPIDURAL; INFILTRATION; INTRACAUDAL; PERINEURAL at 07:07

## 2021-11-30 RX ADMIN — EPHEDRINE SULFATE 5 MG: 50 INJECTION, SOLUTION INTRAVENOUS at 07:44

## 2021-11-30 RX ADMIN — EPHEDRINE SULFATE 15 MG: 50 INJECTION, SOLUTION INTRAVENOUS at 08:13

## 2021-11-30 RX ADMIN — FENTANYL CITRATE 100 MCG: 50 INJECTION INTRAMUSCULAR; INTRAVENOUS at 07:07

## 2021-11-30 RX ADMIN — KETAMINE HYDROCHLORIDE 20 MG: 50 INJECTION INTRAMUSCULAR; INTRAVENOUS at 08:53

## 2021-11-30 RX ADMIN — FENTANYL CITRATE 25 MCG: 50 INJECTION INTRAMUSCULAR; INTRAVENOUS at 10:51

## 2021-11-30 NOTE — OP NOTE
OPERATIVE REPORT  PATIENT NAME: Lindsey Mandel Floor    :  1970  MRN: [id number]  Pt Location: [facility] OR ROOM 08    SURGERY DATE: 2021    Surgeon(s) and Role:     * Asaf Pierre MD - Primary     * Shawna Lau DO - Assisting    Preop Diagnosis:  Lifelong obesity [F66  9]Body mass index is 35 65 kg/m²  Diabetes mellitus ([facility] [address] ) [E11 9]  Benign essential hypertension [I10]  Esophageal reflux [K21 9]    Post-Op Diagnosis Codes:     * Lifelong obesity [E66 9]     * Diabetes mellitus ([facility] [address] ) [E11 9]     * Benign essential hypertension [I10]     * Esophageal reflux [K21 9]    Procedure(s) (LRB):  ROBOTIC BYPASS GASTRIC GWYN-EN-Y,INTRAOP EGD  (N/A)    Specimen(s):  ID Type Source Tests Collected by Time Destination   1 : hernia sac Tissue Soft Tissue, Other TISSUE EXAM Asaf Pierre MD 2021 7177        Estimated Blood Loss:   20 ml    Drains:  * No LDAs found *    Anesthesia Type:   General    Operative Indications:  Lifelong obesity [E66 9]  Diabetes mellitus ([facility] [address] ) [E11 9]  Benign essential hypertension [I10]  Esophageal reflux [K21 9]      Operative Findings:  Large paraesophageal hernia    Complications:   None    Procedure and Technique:  Robotic paraesophageal hernia repair with bio a mesh  Robotic Gwyn-en-Y gastric bypass with intraop endoscopy   I was present for the entire procedure    Patient Disposition:  hemodynamically stable     No qualified resident was available to assist assistance was necessary for traction counter-traction and help with stapling and intraoperative endoscopy     Procedure: The patient was brought to the operating room and placed in a supine position  The patient received a dose of IV antibiotics and a dose of subcutaneous Heparin prior to the procedure  The patient was induced under general endotracheal anesthesia  The abdominal wall was prepped and draped under sterile conditions in the usual fashion   The procedure was started by obtaining access to the abdominal cavity using a Veress needle to the left side of the midline around 6 inches from the xiphoid the abdominal cavity was insufflated with CO2 to a pressure of 15 mmHg  After that, the abdomen was entered with an 8 mm trocar using an Optiview trocar under direct visualization  At that point, an 8 and 12 mm robotic trocars were placed on the right side of the abdominal wall, under direct visualization, and another 8 mm robotic trocar and 12 mm assistant trocar were placed on the left side of the abdominal wall, also under direct visualization  A TAP block was performed using 20 ml of Exparel mixed with saline and 0 5 % Marcaine for a total of 100 ml  The patient was then placed in a reverse Trendelenburg position  A Chantelle retractor was placed through a small stab incision below the xiphoid and was used to retract the left lobe of the liver in a medial fashion, the robot was then docked and locked in place  An OG tube was placed to decompress the stomach and then removed immediately  The procedure was started by lifting the omentum in a cephalad fashion  The omentum was then split in half using the vessel sealer  The ligament of Treitz was identified and then the small bowel was transected with a 60 mm stapler using a white load  The mesentery of the small bowel was then transected using the vessel sealer,   After that, the distal small bowel was run for 150 cm in a way to obtain a 150 cm long Kayleigh limb  At that point, two enterotomies were made in the BP limb and the Kayleigh limb with hot scissors l, and the jejunojejunostomy was fashioned using a 60 mm stapler with a white load  After firing the stapler, the staple line was inspected and there was no evidence of bleeding and the staple line was well formed  The common enterotomy of the jejunojejunostomy was closed in two layers using 2-0 Vicryl running suture for the first layer and  2-0 V LOC in a running fashion for the second layer   The mesenteric defect of the small bowel was approximated using nonabsorbable suture in a running fashion  At that point, we turned our attention to the upper portion of the abdomen  Upon inspecting the hiatus there was evidence of a paraesophageal hernia  I started the dissection on the right side by taking the pars flaccida down all the way up to the right krys the right krys was dissected away from the hernia sac and esophageal wall, the right vagus nerve was clearly identified and kept out of harm's way  I then turned my attention to the left krys, in order to expose left krys clearly I mobilized the gastric fundus by taking the short gastrics down using the vessel sealer of note the fundus was acutely inflamed and had herniated into the thoracic cavity  The fundus was reduced and the hernia sac dissected away from the left krys and then the plane of dissection around the left krys was connected anteriorly with my plane of dissection that was developed previously while dissecting the right krys  The phrenoesophageal ligament was also taken down in its entirety and the hernia sac dissected anteriorly away from the esophageal wall left vagus nerve was identified and preserved  At that point the hernia sac was completely dissected and excised  The decision was then made to repair the hernia posteriorly  Right krys and left krys were dissected away from the hernia sac and skeletonized all the way down to the confluence  Esophagus was kept out of harm's way during the dissection  Both vagi were also identified and preserved as previously mentioned  Dissection was carried all the way up in the thoracic cavity to obtain more length of the esophagus  The dissection was completed circumferentially around the esophagus  We had at least 3 cm of the lower esophagus in an intra-abdominal location by the end of the dissection  Hernia sac was completely dissected and excised   Right krys and left krys were then approximated using 0 Ethibond sutures placed in an interrupted fashion  A bio a mesh was trimmed to accommodate the esophagus and was placed around the esophagus and secured in place with simple 0 Ethibond suture  The pars flaccida was then opened and a gastric pouch was created with serial firings of the Sureform 60 mm intuitive  stapler with a green cartridge reinforced with Seamguard  After the formation of the gastric pouch, the staple lines of the pouch and the gastric remnant were inspected and appeared to be well formed and also appeared to be hemostatic  A new gastrojejunostomy anastomosis was then fashioned in an antecolic antegastric fashion in 2 layers  Outer layer was a running layer of 2-0 V lock suture and the inner  layer was a running 2-0 Vicryl suture  The Kristeen [place] defect was closed with a simple nonabsorbable 2 0 Ethibond suture in a figure-of-eight  Fashion  Upper endoscopy was then performed and showed no evidence of bubbles or bleeding at the suture line of the anastomosis or the staple line of the gastric pouch  The gastrojejunostomy was covered with an omental flap that was secured in place with an absorbable suture in a simple fashion  The [facility] liver retractor was removed  The 12 mm port was closed  with 1-0 Vicryl in a simple fashion  All the skin edges of the trocar sites were approximated with 4-0 Monocryl in a subcuticular inverted fashion  The patient was extubated and transferred to the PACU in stable condition      SIGNATURE: Wiliam Lo MD  DATE: November 30, 2021  TIME: 9:25 AM

## 2021-11-30 NOTE — INTERVAL H&P NOTE
H&P reviewed  After examining the patient I find no changes in the patients condition since the H&P had been written      Vitals:    11/30/21 0534   BP: 121/59   Pulse: 89   Resp: 16   Temp: 97 9 °F (36 6 °C)   SpO2: 95%

## 2021-11-30 NOTE — PERIOPERATIVE NURSING NOTE
Patient sleeping  When awakens, states pain is 8/10 - stomach and radiating to shoulders  Patient's FLACC = 0, and easily falling asleep  Explained that pain is due to gas  No pain medication given at this time

## 2021-12-01 VITALS
TEMPERATURE: 98.1 F | HEIGHT: 66 IN | OXYGEN SATURATION: 95 % | WEIGHT: 220.9 LBS | RESPIRATION RATE: 19 BRPM | BODY MASS INDEX: 35.5 KG/M2 | SYSTOLIC BLOOD PRESSURE: 118 MMHG | DIASTOLIC BLOOD PRESSURE: 75 MMHG | HEART RATE: 87 BPM

## 2021-12-01 PROBLEM — M25.512 ACUTE PAIN OF LEFT SHOULDER: Status: ACTIVE | Noted: 2021-12-01

## 2021-12-01 PROBLEM — G47.33 OBSTRUCTIVE SLEEP APNEA SYNDROME: Status: ACTIVE | Noted: 2021-12-01

## 2021-12-01 PROBLEM — E11.9 TYPE 2 DIABETES MELLITUS WITHOUT COMPLICATION, WITHOUT LONG-TERM CURRENT USE OF INSULIN (HCC): Status: ACTIVE | Noted: 2021-12-01

## 2021-12-01 LAB
ANION GAP SERPL CALCULATED.3IONS-SCNC: 11 MMOL/L (ref 4–13)
BUN SERPL-MCNC: 12 MG/DL (ref 5–25)
CALCIUM SERPL-MCNC: 8.5 MG/DL (ref 8.3–10.1)
CHLORIDE SERPL-SCNC: 105 MMOL/L (ref 100–108)
CO2 SERPL-SCNC: 24 MMOL/L (ref 21–32)
CREAT SERPL-MCNC: 1.2 MG/DL (ref 0.6–1.3)
ERYTHROCYTE [DISTWIDTH] IN BLOOD BY AUTOMATED COUNT: 13.5 % (ref 11.6–15.1)
GFR SERPL CREATININE-BSD FRML MDRD: 52 ML/MIN/1.73SQ M
GLUCOSE SERPL-MCNC: 130 MG/DL (ref 65–140)
HCT VFR BLD AUTO: 35.8 % (ref 34.8–46.1)
HCT VFR BLD AUTO: 36.7 % (ref 34.8–46.1)
HGB BLD-MCNC: 11.5 G/DL (ref 11.5–15.4)
HGB BLD-MCNC: 11.6 G/DL (ref 11.5–15.4)
MCH RBC QN AUTO: 28.7 PG (ref 26.8–34.3)
MCHC RBC AUTO-ENTMCNC: 31.6 G/DL (ref 31.4–37.4)
MCV RBC AUTO: 91 FL (ref 82–98)
PLATELET # BLD AUTO: 218 THOUSANDS/UL (ref 149–390)
PMV BLD AUTO: 9.5 FL (ref 8.9–12.7)
POTASSIUM SERPL-SCNC: 4 MMOL/L (ref 3.5–5.3)
RBC # BLD AUTO: 4.04 MILLION/UL (ref 3.81–5.12)
SODIUM SERPL-SCNC: 140 MMOL/L (ref 136–145)
WBC # BLD AUTO: 11.6 THOUSAND/UL (ref 4.31–10.16)

## 2021-12-01 PROCEDURE — 99222 1ST HOSP IP/OBS MODERATE 55: CPT | Performed by: STUDENT IN AN ORGANIZED HEALTH CARE EDUCATION/TRAINING PROGRAM

## 2021-12-01 PROCEDURE — 85027 COMPLETE CBC AUTOMATED: CPT | Performed by: PHYSICIAN ASSISTANT

## 2021-12-01 PROCEDURE — 80048 BASIC METABOLIC PNL TOTAL CA: CPT | Performed by: PHYSICIAN ASSISTANT

## 2021-12-01 PROCEDURE — 99024 POSTOP FOLLOW-UP VISIT: CPT | Performed by: SURGERY

## 2021-12-01 PROCEDURE — 85018 HEMOGLOBIN: CPT | Performed by: PHYSICIAN ASSISTANT

## 2021-12-01 PROCEDURE — NC001 PR NO CHARGE: Performed by: SURGERY

## 2021-12-01 PROCEDURE — 85014 HEMATOCRIT: CPT | Performed by: PHYSICIAN ASSISTANT

## 2021-12-01 RX ORDER — ACETAMINOPHEN 160 MG/5ML
650 SUSPENSION, ORAL (FINAL DOSE FORM) ORAL EVERY 8 HOURS
Qty: 426.3 ML | Refills: 0 | Status: SHIPPED | OUTPATIENT
Start: 2021-12-01 | End: 2021-12-08

## 2021-12-01 RX ADMIN — FAMOTIDINE 20 MG: 10 INJECTION INTRAVENOUS at 08:21

## 2021-12-01 RX ADMIN — ONDANSETRON 4 MG: 2 INJECTION INTRAMUSCULAR; INTRAVENOUS at 01:31

## 2021-12-01 RX ADMIN — MORPHINE SULFATE 4 MG: 4 INJECTION INTRAVENOUS at 12:02

## 2021-12-01 RX ADMIN — METOCLOPRAMIDE 10 MG: 5 INJECTION, SOLUTION INTRAMUSCULAR; INTRAVENOUS at 12:00

## 2021-12-01 RX ADMIN — ONDANSETRON 4 MG: 2 INJECTION INTRAMUSCULAR; INTRAVENOUS at 08:21

## 2021-12-01 RX ADMIN — METOCLOPRAMIDE 10 MG: 5 INJECTION, SOLUTION INTRAMUSCULAR; INTRAVENOUS at 05:23

## 2021-12-01 RX ADMIN — SODIUM CHLORIDE, SODIUM LACTATE, POTASSIUM CHLORIDE, AND CALCIUM CHLORIDE 100 ML/HR: .6; .31; .03; .02 INJECTION, SOLUTION INTRAVENOUS at 08:31

## 2021-12-01 RX ADMIN — GABAPENTIN 300 MG: 300 CAPSULE ORAL at 08:22

## 2021-12-01 RX ADMIN — MORPHINE SULFATE 4 MG: 4 INJECTION INTRAVENOUS at 01:32

## 2021-12-01 RX ADMIN — CEFOXITIN SODIUM 2000 MG: 2 INJECTION, SOLUTION INTRAVENOUS at 05:18

## 2021-12-01 RX ADMIN — HYDROCODONE BITARTRATE AND ACETAMINOPHEN 1 TABLET: 5; 325 TABLET ORAL at 05:17

## 2021-12-01 RX ADMIN — CEFOXITIN SODIUM 2000 MG: 1 POWDER, FOR SOLUTION INTRAVENOUS at 00:08

## 2021-12-01 NOTE — PLAN OF CARE
Problem: MOBILITY - ADULT  Goal: Maintain or return to baseline ADL function  Description: INTERVENTIONS:  -  Assess patient's ability to carry out ADLs; assess patient's baseline for ADL function and identify physical deficits which impact ability to perform ADLs (bathing, care of mouth/teeth, toileting, grooming, dressing, etc )  - Assess/evaluate cause of self-care deficits   - Assess range of motion  - Assess patient's mobility; develop plan if impaired  - Assess patient's need for assistive devices and provide as appropriate  - Encourage maximum independence but intervene and supervise when necessary  - Involve family in performance of ADLs  - Assess for home care needs following discharge   - Consider OT consult to assist with ADL evaluation and planning for discharge  - Provide patient education as appropriate  12/1/2021 0017 by Giancarlo Choe RN  Outcome: Progressing  12/1/2021 0016 by Giancarlo Choe RN  Outcome: Progressing  Goal: Maintains/Returns to pre admission functional level  Description: INTERVENTIONS:  - Perform BMAT or MOVE assessment daily    - Set and communicate daily mobility goal to care team and patient/family/caregiver  - Collaborate with rehabilitation services on mobility goals if consulted  - Perform Range of Motion 4 times a day  - Reposition patient every 2 hours    - Dangle patient 3 times a day  - Stand patient 3 times a day  - Ambulate patient 3 times a day  - Out of bed to chair 3 times a day   - Out of bed for meals 3 times a day  - Out of bed for toileting  - Record patient progress and toleration of activity level   12/1/2021 0017 by Giancarlo Choe RN  Outcome: Progressing  12/1/2021 0016 by Giancarlo Choe RN  Outcome: Progressing     Problem: PAIN - ADULT  Goal: Verbalizes/displays adequate comfort level or baseline comfort level  Description: Interventions:  - Encourage patient to monitor pain and request assistance  - Assess pain using appropriate pain scale  - Administer analgesics based on type and severity of pain and evaluate response  - Implement non-pharmacological measures as appropriate and evaluate response  - Consider cultural and social influences on pain and pain management  - Notify physician/advanced practitioner if interventions unsuccessful or patient reports new pain  Outcome: Progressing     Problem: INFECTION - ADULT  Goal: Absence or prevention of progression during hospitalization  Description: INTERVENTIONS:  - Assess and monitor for signs and symptoms of infection  - Monitor lab/diagnostic results  - Monitor all insertion sites, i e  indwelling lines, tubes, and drains  - Monitor endotracheal if appropriate and nasal secretions for changes in amount and color  - [place] appropriate cooling/warming therapies per order  - Administer medications as ordered  - Instruct and encourage patient and family to use good hand hygiene technique  - Identify and instruct in appropriate isolation precautions for identified infection/condition  Outcome: Progressing  Goal: Absence of fever/infection during neutropenic period  Description: INTERVENTIONS:  - Monitor WBC    Outcome: Progressing     Problem: SAFETY ADULT  Goal: Maintain or return to baseline ADL function  Description: INTERVENTIONS:  -  Assess patient's ability to carry out ADLs; assess patient's baseline for ADL function and identify physical deficits which impact ability to perform ADLs (bathing, care of mouth/teeth, toileting, grooming, dressing, etc )  - Assess/evaluate cause of self-care deficits   - Assess range of motion  - Assess patient's mobility; develop plan if impaired  - Assess patient's need for assistive devices and provide as appropriate  - Encourage maximum independence but intervene and supervise when necessary  - Involve family in performance of ADLs  - Assess for home care needs following discharge   - Consider OT consult to assist with ADL evaluation and planning for discharge  - Provide patient education as appropriate  12/1/2021 0017 by John Meyer RN  Outcome: Progressing  12/1/2021 0016 by John Meyer RN  Outcome: Progressing  Goal: Maintains/Returns to pre admission functional level  Description: INTERVENTIONS:  - Perform BMAT or MOVE assessment daily    - Set and communicate daily mobility goal to care team and patient/family/caregiver  - Collaborate with rehabilitation services on mobility goals if consulted  - Perform Range of Motion 4 times a day  - Reposition patient every 2 hours    - Dangle patient 3 times a day  - Stand patient 3 times a day  - Ambulate patient 3 times a day  - Out of bed to chair 3 times a day   - Out of bed for meals 3 times a day  - Out of bed for toileting  - Record patient progress and toleration of activity level   12/1/2021 0017 by John Meyer RN  Outcome: Progressing  12/1/2021 0016 by John Meyer RN  Outcome: Progressing  Goal: Patient will remain free of falls  Description: INTERVENTIONS:  - Educate patient/family on patient safety including physical limitations  - Instruct patient to call for assistance with activity   - Consult OT/PT to assist with strengthening/mobility   - Keep Call bell within reach  - Keep bed low and locked with side rails adjusted as appropriate  - Keep care items and personal belongings within reach  - Initiate and maintain comfort rounds  - Make Fall Risk Sign visible to staff  - Offer Toileting every 2 Hours, in advance of need  - Initiate/Maintain NA alarm  - Obtain necessary fall risk management equipment: socks, assistive device  - Apply yellow socks and bracelet for high fall risk patients  - Consider moving patient to room near nurses station  Outcome: Progressing     Problem: DISCHARGE PLANNING  Goal: Discharge to home or other facility with appropriate resources  Description: INTERVENTIONS:  - Identify barriers to discharge w/patient and caregiver  - Arrange for needed discharge resources and transportation as appropriate  - Identify discharge learning needs (meds, wound care, etc )  - Arrange for interpretive services to assist at discharge as needed  - Refer to Case Management Department for coordinating discharge planning if the patient needs post-hospital services based on physician/advanced practitioner order or complex needs related to functional status, cognitive ability, or social support system  Outcome: Progressing     Problem: Knowledge Deficit  Goal: Patient/family/caregiver demonstrates understanding of disease process, treatment plan, medications, and discharge instructions  Description: Complete learning assessment and assess knowledge base    Interventions:  - Provide teaching at level of understanding  - Provide teaching via preferred learning methods  Outcome: Progressing

## 2021-12-01 NOTE — CONSULTS
[facility] 1970, 45 y o  female MRN: [id number]  Unit/Bed#: E5 -01 Encounter: [id number]  Primary Care Provider: Yinka Dixon MD   Date and time admitted to hospital: 11/30/2021  5:09 AM    Inpatient consult to Internal Medicine  Consult performed by: Mariza Wood MD  Consult ordered by: Hosea Camilo PA-C          * Hiatal hernia  Assessment & Plan  History of obesity the and hiatal hernia status post hiatal hernia repair and Kayleigh-en-Y bypass 11/30/21  Consult for medical management  Continue PPI  Pain management per primary  Currently on liquid diet, patient continues to have some abdominal discomfort    Obstructive sleep apnea syndrome  Assessment & Plan  Continue CPAP    Type 2 diabetes mellitus without complication, without long-term current use of insulin [facility])  Assessment & Plan  Lab Results   Component Value Date    HGBA1C 6 0 (H) 04/18/2019       Recent Labs     11/30/21  0605 11/30/21  1014   POCGLU 166* 185*       Previous A1c of 6 5 back in June  Recommend discontinue metformin on discharge  Follow-up PCP, as previously scheduled to consideration of resuming metformin versus changing to different oral diabetic medication    PONV (postoperative nausea and vomiting)  Assessment & Plan  Somewhat improving suspect likely from recent surgery and pain  P r n  Antiemetics  Minimize pain use if able    Essential hypertension  Assessment & Plan  Okay to discontinue bisoprolol, resume when BP elevated        VTE Prophylaxis: Sequential compression device (Venodyne)   / sequential compression device     Recommendations for Discharge:  · Discontinue metformin on discharge, follow-up with PCP to resume  · Recommending monitoring blood pressure, can discontinue bisoprolol until blood pressure improves, anticipate if tolerating increased p o  Intake    Counseling / Coordination of Care Time: 30 minutes    Greater than 50% of total time spent on patient counseling and coordination of care  Collaboration of Care: Were Recommendations Directly Discussed with Primary Treatment Team? - Yes     History of Present Illness:    Kina Vu is a 45 y o  female who is originally admitted to the bariatric service due to elective Kayleigh-en-Y and hiatal hernia repair  We are consulted for medical management  Patient has past medical history of obesity, GERD, CHIDI, and recent left rotator cuff tear  Patient tolerated surgery well on 11/30/2021  She reports able to drink liquids without difficulty, she continues to have significant pain though  Denies any nausea or vomiting  She continues to have some pain in her left shoulder which has been chronic, for which she is seeing orthopaedic surgery outpatient with planned left shoulder replacement on 12/27  Her main concern currently his her pain  Recently diagnosed with sleep apnea and has been compliant with her CPAP  She denies any tobacco use and drinks alcohol socially several times a year  Review of Systems:    Review of Systems   Constitutional: Positive for appetite change  Negative for activity change, chills and fever  HENT: Negative for congestion, facial swelling, sinus pain and sore throat  Respiratory: Negative for cough and shortness of breath  Cardiovascular: Negative for chest pain and leg swelling  Gastrointestinal: Positive for abdominal pain  Negative for abdominal distention, constipation, diarrhea, nausea and vomiting  Genitourinary: Negative for dysuria and flank pain  Skin: Negative for color change and pallor  Neurological: Negative for weakness, light-headedness and headaches  Psychiatric/Behavioral: Negative for agitation and confusion         Past Medical and Surgical History:     Past Medical History:   Diagnosis Date    Arthritis     Asthma     CPAP (continuous positive airway pressure) dependence     Depression     Diabetes mellitus ([facility] [address] )     Gait disturbance uses w/c for long distance    Gastritis     Hiatal hernia     Hx of fusion of cervical spine     Hypertension     Migraines     Morbid obesity ([address] )     PONV (postoperative nausea and vomiting)     difficulty awakening- too much CO2    Pulmonary emboli (HCC)     hx blood clot also in neck    S/P insertion of spinal cord stimulator     Sleep apnea     Tachycardia     Wears contact lenses        Past Surgical History:   Procedure Laterality Date    COLONOSCOPY      DISCECTOMY SPINE CERVICAL POSTERIOR      HYSTERECTOMY      and bladder sling    JOINT REPLACEMENT      left TKR and partial right    NECK SURGERY      cervical fusion    UT LAP GASTRIC BYPASS/GWYN-EN-Y N/A 11/30/2021    Procedure: ROBOTIC BYPASS GASTRIC GWYN-EN-Y,INTRAOP EGD, PARAESOPHAGEAL HERNIA REPAIR W/ MESH;  Surgeon: Estephania Lentz MD;  Location: [facility];  Service: Bariatrics    SHOULDER OPEN ROTATOR CUFF REPAIR      SPINAL CORD STIMULATOR IMPLANT         Meds/Allergies:    all medications and allergies reviewed    Allergies:    Allergies   Allergen Reactions    Iodine - Food Allergy Anaphylaxis           Lyrica [Pregabalin] Other (See Comments)     Suicidal ideations    Shellfish Allergy - Food Allergy Anaphylaxis    Wellbutrin [Bupropion] Hives     Shaky and dizzy    Wound Dressing Adhesive Hives     Including tegaderm dressing, paper tape ok but can react to it     Latex Hives    Percocet [Oxycodone-Acetaminophen] Itching       Social History:     Marital Status: /Civil Union    Substance Use History:   Social History     Substance and Sexual Activity   Alcohol Use Yes    Comment: few x year     Social History     Tobacco Use   Smoking Status Never Smoker   Smokeless Tobacco Never Used     Social History     Substance and Sexual Activity   Drug Use No       Family History:    Family History   Problem Relation Age of Onset    Arthritis Mother     Asthma Mother     Depression Mother     Heart disease Father     Diabetes Father     Diabetes Sister     Heart disease Paternal Uncle     Heart disease Paternal Grandfather        Physical Exam:     Vitals:   Blood Pressure: 118/75 (12/01/21 0748)  Pulse: 87 (12/01/21 0748)  Temperature: 98 1 °F (36 7 °C) (12/01/21 0321)  Temp Source: Oral (12/01/21 0321)  Respirations: 19 (12/01/21 0748)  Height: 5' 6" (167 6 cm) (11/30/21 0534)  Weight - Scale: 100 kg (220 lb 14 4 oz) (11/30/21 0534)  SpO2: 95 % (12/01/21 0748)    Physical Exam  Vitals and nursing note reviewed  Constitutional:       Appearance: Normal appearance  She is obese  HENT:      Head: Normocephalic and atraumatic  Eyes:      General: No scleral icterus  Conjunctiva/sclera: Conjunctivae normal    Cardiovascular:      Rate and Rhythm: Normal rate and regular rhythm  Heart sounds: Normal heart sounds  Pulmonary:      Breath sounds: Normal breath sounds  No wheezing or rhonchi  Comments: Decreased breath sounds bilaterally  Abdominal:      General: Bowel sounds are normal  There is no distension  Palpations: Abdomen is soft  Tenderness: There is no abdominal tenderness  Musculoskeletal:         General: No swelling  Right lower leg: No edema  Left lower leg: No edema  Skin:     General: Skin is warm and dry  Neurological:      General: No focal deficit present  Mental Status: She is alert  Mental status is at baseline  Additional Data:     Lab Results: I have personally reviewed pertinent reports  Results from last 7 days   Lab Units 12/01/21  1038 12/01/21 0515 12/01/21  0515   WBC Thousand/uL  --   --  11 60*   HEMOGLOBIN g/dL 11 5   < > 11 6   HEMATOCRIT % 35 8   < > 36 7   PLATELETS Thousands/uL  --   --  218    < > = values in this interval not displayed       Results from last 7 days   Lab Units 12/01/21  0515   SODIUM mmol/L 140   POTASSIUM mmol/L 4 0   CHLORIDE mmol/L 105   CO2 mmol/L 24   BUN mg/dL 12   CREATININE mg/dL 1 20 ANION GAP mmol/L 11   CALCIUM mg/dL 8 5   GLUCOSE RANDOM mg/dL 130             Lab Results   Component Value Date/Time    HGBA1C 6 0 (H) 04/18/2019 11:52 AM    HGBA1C 6 0 04/03/2018 10:51 AM    HGBA1C 6 0 03/07/2017 08:43 AM     Results from last 7 days   Lab Units 11/30/21  1014 11/30/21  0605   POC GLUCOSE mg/dl 185* 166*           Imaging: I have personally reviewed pertinent reports  No orders to display       EKG, Pathology, and Other Studies Reviewed on Admission:   · EKG: None    ** Please Note: This note has been constructed using a voice recognition system   **

## 2021-12-01 NOTE — DISCHARGE SUMMARY
Discharge Summary - Lio Seen 45 y o  female MRN: [id number]    Unit/Bed#: E5 -01 Encounter: [id number]      Pre-Operative Diagnosis: Pre-Op Diagnosis Codes:     * Lifelong obesity [E66 9]     * Diabetes mellitus ([address] ) [E11 9]     * Benign essential hypertension [I10]     * Esophageal reflux [K21 9]    Post-Operative Diagnosis: Post-Op Diagnosis Codes:     * Lifelong obesity [E66 9]     * Diabetes mellitus ([address] ) [E11 9]     * Benign essential hypertension [I10]     * Esophageal reflux [K21 9]    Procedures Performed:  Procedure(s):  ROBOTIC BYPASS GASTRIC GWYN-EN-Y,INTRAOP EGD, PARAESOPHAGEAL HERNIA REPAIR W/ MESH    Surgeon: Estelita Manzano MD    See H & P for full details of admission and Operative Note for full details of operations performed  Patient tolerated surgery well without complications  In the morning postoperative Day 1, the patient had mild nausea and abdominal pain  Tolerated a clear liquid diet without vomiting  Able to ambulate and voiding independently  Patient was deemed ready for discharge home  SLIM consulted for home medication management  Patient was seen and examined prior to discharge  Provisions for Follow-Up Care:  See After Visit Summary/Discharge Instructions for information related to follow-up care and home orders  Disposition: Home, in stable condition  Planned Readmission: No    Discharge Medications:  See After Visit Summary/Discharge Instructions for reconciled discharge medications provided to patient and family  Post Operative instructions: Reviewed with patient and/or family      Signature:   Mackenzie Jonas PA-C  Date: 12/1/2021 Time: 12:50 PM

## 2021-12-01 NOTE — PLAN OF CARE
Problem: MOBILITY - ADULT  Goal: Maintain or return to baseline ADL function  Description: INTERVENTIONS:  -  Assess patient's ability to carry out ADLs; assess patient's baseline for ADL function and identify physical deficits which impact ability to perform ADLs (bathing, care of mouth/teeth, toileting, grooming, dressing, etc )  - Assess/evaluate cause of self-care deficits   - Assess range of motion  - Assess patient's mobility; develop plan if impaired  - Assess patient's need for assistive devices and provide as appropriate  - Encourage maximum independence but intervene and supervise when necessary  - Involve family in performance of ADLs  - Assess for home care needs following discharge   - Consider OT consult to assist with ADL evaluation and planning for discharge  - Provide patient education as appropriate  Outcome: Progressing  Goal: Maintains/Returns to pre admission functional level  Description: INTERVENTIONS:  - Perform BMAT or MOVE assessment daily    - Set and communicate daily mobility goal to care team and patient/family/caregiver  - Collaborate with rehabilitation services on mobility goals if consulted  - Perform Range of Motion  times a day  - Reposition patient every  hours    - Dangle patient  times a day  - Stand patient  times a day  - Ambulate patient  times a day  - Out of bed to chair  times a day   - Out of bed for meals  times a day  - Out of bed for toileting  - Record patient progress and toleration of activity level   Outcome: Progressing     Problem: PAIN - ADULT  Goal: Verbalizes/displays adequate comfort level or baseline comfort level  Description: Interventions:  - Encourage patient to monitor pain and request assistance  - Assess pain using appropriate pain scale  - Administer analgesics based on type and severity of pain and evaluate response  - Implement non-pharmacological measures as appropriate and evaluate response  - Consider cultural and social influences on pain and pain management  - Notify physician/advanced practitioner if interventions unsuccessful or patient reports new pain  Outcome: Progressing     Problem: INFECTION - ADULT  Goal: Absence or prevention of progression during hospitalization  Description: INTERVENTIONS:  - Assess and monitor for signs and symptoms of infection  - Monitor lab/diagnostic results  - Monitor all insertion sites, i e  indwelling lines, tubes, and drains  - Monitor endotracheal if appropriate and nasal secretions for changes in amount and color  - [place] appropriate cooling/warming therapies per order  - Administer medications as ordered  - Instruct and encourage patient and family to use good hand hygiene technique  - Identify and instruct in appropriate isolation precautions for identified infection/condition  Outcome: Progressing  Goal: Absence of fever/infection during neutropenic period  Description: INTERVENTIONS:  - Monitor WBC    Outcome: Progressing     Problem: DISCHARGE PLANNING  Goal: Discharge to home or other facility with appropriate resources  Description: INTERVENTIONS:  - Identify barriers to discharge w/patient and caregiver  - Arrange for needed discharge resources and transportation as appropriate  - Identify discharge learning needs (meds, wound care, etc )  - Arrange for interpretive services to assist at discharge as needed  - Refer to Case Management Department for coordinating discharge planning if the patient needs post-hospital services based on physician/advanced practitioner order or complex needs related to functional status, cognitive ability, or social support system  Outcome: Progressing     Problem: Knowledge Deficit  Goal: Patient/family/caregiver demonstrates understanding of disease process, treatment plan, medications, and discharge instructions  Description: Complete learning assessment and assess knowledge base    Interventions:  - Provide teaching at level of understanding  - Provide teaching via preferred learning methods  Outcome: Progressing     Problem: Potential for Falls  Goal: Patient will remain free of falls  Description: INTERVENTIONS:  - Educate patient/family on patient safety including physical limitations  - Instruct patient to call for assistance with activity   - Consult OT/PT to assist with strengthening/mobility   - Keep Call bell within reach  - Keep bed low and locked with side rails adjusted as appropriate  - Keep care items and personal belongings within reach  - Initiate and maintain comfort rounds  - Make Fall Risk Sign visible to staff  - Offer Toileting every  Hours, in advance of need  - Initiate/Maintain alarm  - Obtain necessary fall risk management equipment:   - Apply yellow socks and bracelet for high fall risk patients  - Consider moving patient to room near nurses station  Outcome: Progressing

## 2021-12-01 NOTE — ASSESSMENT & PLAN NOTE
History of obesity the and hiatal hernia status post hiatal hernia repair and Kayleigh-en-Y bypass 11/30/21  Consult for medical management  Continue PPI  Pain management per primary  Currently on liquid diet, patient continues to have some abdominal discomfort

## 2021-12-01 NOTE — UTILIZATION REVIEW
Initial Clinical Review    Elective inpatient surgical procedure    Age/Sex: 45 y o  female     Surgery Date: 11/30/21    Procedure: ROBOTIC BYPASS GASTRIC GWYN-EN-Y, INTRAOP EGD    Anesthesia: General    Operative Findings: large paraesophageal hernia    12/1 Bariatric Surgery:  POD1 s/p robotic RNYGB  Patient afebrile and hemodynamically stable  Encourage PO fluids  Nausea control PRN, discontinued Dilaudid  Recommend ambulation, use of SCDs when not ambulating, and incentive spirometry  Complete antibiotic course  CBC today shows Hgb drop at 11 6 from 14 8 preop and elevated WBC at 11 60  Repeat H&H at [place] [address]  Internal Medicine consult pending  Internal Medicine: Discontinue metformin on discharge, follow-up with PCP to resume  Recommending monitoring blood pressure, can discontinue bisoprolol until blood pressure improves, anticipate if tolerating increased p o  intake          Admission Orders: Date/Time/Statement:   Admission Orders (From admission, onward)     Ordered        11/30/21 0839  Inpatient Admission  Once                      Orders Placed This Encounter   Procedures    Inpatient Admission     Standing Status:   Standing     Number of Occurrences:   1     Order Specific Question:   Level of Care     Answer:   Med Surg [16]     Order Specific Question:   Estimated length of stay     Answer:   Inpatient Only Surgery     Vital Signs:         Date/Time Temp Pulse Resp BP MAP (mmHg) SpO2 Calculated FIO2 (%) - Nasal Cannula Nasal Cannula O2 Flow Rate (L/min) O2 Device   12/01/21 07:48:59 -- 87 19 118/75 89 95 % -- -- --   12/01/21 03:21:27 98 1 °F (36 7 °C) 80 18 105/63 77 97 % -- -- --   11/30/21 22:21:58 99 1 °F (37 3 °C) 85 18 120/77 91 97 % -- -- --   11/30/21 19:34:03 98 7 °F (37 1 °C) 100 -- 116/66 83 100 % -- -- --   11/30/21 1930 -- -- -- -- -- -- -- -- None (Room air)   11/30/21 15:17:02 98 4 °F (36 9 °C) 97 -- 130/84 99 96 % -- -- --   11/30/21 14:26:55 -- 98 18 129/86 100 94 % -- -- -- 11/30/21 1351 -- 98 13 138/78 100 95 % 36 4 L/min Nasal cannula   11/30/21 1321 -- 98 13 139/77 100 95 % 36 4 L/min Nasal cannula   11/30/21 1251 -- 98 17 139/83 104 95 % 36 4 L/min Nasal cannula   11/30/21 1221 -- 100 14 136/83 104 96 % 36 4 L/min Nasal cannula   11/30/21 1151 -- 96 12 144/86 109 95 % 36 4 L/min Nasal cannula   11/30/21 1147 98 °F (36 7 °C) -- -- -- -- -- -- -- --   11/30/21 1145 -- 98 -- 150/89 -- -- -- -- --   11/30/21 1134 -- 90 17 160/84 115 96 % 36 4 L/min Nasal cannula   11/30/21 1123 -- 90 -- 161/90 -- -- -- -- --   11/30/21 1119 -- 88 13 165/94 122 94 % 36 4 L/min Nasal cannula   11/30/21 1104 -- 86 13 153/89 115 94 % 36 4 L/min Nasal cannula   11/30/21 1049 -- 86 14 152/83 110 94 % 36 4 L/min Nasal cannula   11/30/21 1042 -- -- -- -- -- 92 % 36 4 L/min Nasal cannula   11/30/21 1035 -- -- -- -- -- 96 % 28 2 L/min Nasal cannula   11/30/21 0534 97 9 °F (36 6 °C) 89 16 121/59 -- 95 % -- -- None (Room air)         Pertinent Labs/Diagnostic Test Results:       Results from last 7 days   Lab Units 12/01/21  1038 12/01/21  0515   WBC Thousand/uL  --  11 60*   HEMOGLOBIN g/dL 11 5 11 6   HEMATOCRIT % 35 8 36 7   PLATELETS Thousands/uL  --  218         Results from last 7 days   Lab Units 12/01/21  0515   SODIUM mmol/L 140   POTASSIUM mmol/L 4 0   CHLORIDE mmol/L 105   CO2 mmol/L 24   ANION GAP mmol/L 11   BUN mg/dL 12   CREATININE mg/dL 1 20   EGFR ml/min/1 73sq m 52   CALCIUM mg/dL 8 5         Results from last 7 days   Lab Units 11/30/21  1014 11/30/21  0605   POC GLUCOSE mg/dl 185* 166*     Results from last 7 days   Lab Units 12/01/21  0515   GLUCOSE RANDOM mg/dL 130           Diet: Bariatric clear liquid diet  Mobility: up as tolerated  DVT Prophylaxis: SCD    Additional post op orders: continuous capnography       Medications/Pain Control:       Scheduled Medications:  bisoprolol, 5 mg, Oral, QPM  DULoxetine, 60 mg, Oral, QPM  famotidine, 20 mg, Intravenous, BID  gabapentin, 1,200 mg, Oral, HS  gabapentin, 300 mg, Oral, Daily      cefOXitin (MEFOXIN) 2,000 mg in sodium chloride 0 9 % 50 mL IVPB x 2 doses post op  Dose: 2,000 mg  Freq: Every 6 hours Route: IV  Last Dose: Stopped (12/01/21 0122)  Start: 11/30/21 1400 End: 12/01/21 0122    Continuous IV Infusions:    lactated ringers, 100 mL/hr, Intravenous, Continuous      PRN Meds:      acetaminophen, 650 mg, Oral, Q8H PRN  albuterol, 2 puff, Inhalation, Q4H PRN  diphenhydrAMINE, 25 mg, Oral, Q8H PRN  HYDROcodone-acetaminophen, 1 tablet, Oral, Q6H PRN x 1 dose 12/1  metoclopramide, 10 mg, Intravenous, Q6H PRN x 1 dose 11/30, x 2 doses 12/1  morphine injection, 4 mg, Intravenous, Q4H PRN x 2 doses 12/1  ondansetron, 4 mg, Intravenous, Q6H PRN x 1 dose 11/30, x 2 doses 12/1  phenol, 2 spray, Mouth/Throat, Q2H PRN  promethazine, 25 mg, Intravenous, Q6H PRN  simethicone, 80 mg, Oral, 4x Daily PRN        Network Utilization Review Department  ATTENTION: Please call with any questions or concerns to [phone] and carefully listen to the prompts so that you are directed to the right person  All voicemails are confidential   Abigail Walter all requests for admission clinical reviews, approved or denied determinations and any other requests to dedicated fax number below belonging to the campus where the patient is receiving treatment   List of dedicated fax numbers for the Facilities:  [address] DENIALS (Administrative/Medical Necessity) [phone]   [address] (Maternity/NICU/Pediatrics) [phone] [address] [phone]   [address]  [address] [place] [address] [address] [facility] [phone] 187 [facility] Svet[address] [place] [address] [phone]

## 2021-12-01 NOTE — PROGRESS NOTES
Progress Note - Bariatric Surgery   Marva Salgado 45 y o  female MRN: [id number]  Unit/Bed#: E5 -01 Encounter: [id number]      Subjective/Objective     Subjective:  Patient with obesity POD1 s/p Robotic RNYGB  Patient denies fevers, chills, sweats, SOB, CP, calf pain  Pain adequately controlled on oral pain medication  Ambulating without assistance, voiding well, and using incentive spirometer  Patient tolerating liquid diet with nausea but no vomiting today  Vital signs stable  CBC today shows Hgb drop at 11 6 from 14 8 preop and elevated WBC at 11 60  BMP obtained today WNL  Uses CPAP  SLIM Consulted yesterday for med management  Objective:    /75   Pulse 87   Temp 98 1 °F (36 7 °C) (Oral)   Resp 19   Ht 5' 6" (1 676 m)   Wt 100 kg (220 lb 14 4 oz)   SpO2 95%   Breastfeeding No   BMI 35 65 kg/m²       Intake/Output Summary (Last 24 hours) at 12/1/2021 0839  Last data filed at 12/1/2021 0150  Gross per 24 hour   Intake 2300 ml   Output 1050 ml   Net 1250 ml       Invasive Devices  Report    Peripheral Intravenous Line            Peripheral IV 11/30/21 Dorsal (posterior); Left Hand 1 day                ROS: 10-point system completed  All negative except see HPI  Physical Exam    General Appearance:    Alert, cooperative, no distress, appears stated age   Head:    Normocephalic, without obvious abnormality, atraumatic   Lungs:     Respirations unlabored   Heart:    Regular rate and rhythm   Abdomen:     Soft, appropriate tenderness, no masses, no organomegaly, non-distended   Extremities:   Extremities normal, atraumatic, no cyanosis or edema   Neurologic:  Incision:  Psych:   Normal strength and sensation    Clean, dry, and intact, no bleeding, no drain    Normal mood and affect       Lab, Imaging and other studies:  I have personally reviewed pertinent lab results    , CBC:   Lab Results   Component Value Date    WBC 11 60 (H) 12/01/2021    HGB 11 6 12/01/2021    HCT 36 7 12/01/2021    MCV 91 12/01/2021     12/01/2021    MCH 28 7 12/01/2021    MCHC 31 6 12/01/2021    RDW 13 5 12/01/2021    MPV 9 5 12/01/2021   , CMP:   Lab Results   Component Value Date    SODIUM 140 12/01/2021    K 4 0 12/01/2021     12/01/2021    CO2 24 12/01/2021    BUN 12 12/01/2021    CREATININE 1 20 12/01/2021    CALCIUM 8 5 12/01/2021    EGFR 52 12/01/2021        VTE Mechanical Prophylaxis: sequential compression device    Assessment/Plan  1)  Patient with Obesity s/p Robotic RNYGB with stable post op course  Patient afebrile and hemodynamically stable  - Encourage PO fluids   - Nausea control PRN, discontinued Dilaudid  - Recommend ambulation, use of SCDs when not ambulating, and incentive spirometry  - Complete antibiotic course  - Repeat H&H at 10AM due to Hgb drop  - SLIM consult pending  - Plan to D/C patient home today pending anticipated progression    Plan of care was discussed with patient  Care plan discussed with Dr Marky Carson PA-C  Bariatric Surgery  12/1/2021  8:39 AM

## 2021-12-01 NOTE — UTILIZATION REVIEW
Inpatient Admission Authorization Request   NOTIFICATION OF INPATIENT ADMISSION/INPATIENT AUTHORIZATION REQUEST   SERVICING FACILITY:   [address]  Tax ID: [id number]  NPI: [id number]  Place of Service: Inpatient [facility]  [address]  Place of Service Code: 24     ATTENDING PROVIDER:  Attending Name and NPI#: Judy Kellievirgilio, [place] [[id number]]  Address: [address]  Phone: [phone]     UTILIZATION REVIEW CONTACT:  Francesco Chase Utilization   Network Utilization Review Department  Phone: [phone]  Fax: [phone]  Email: [email or web address]     PHYSICIAN ADVISORY SERVICES:  FOR XHLM-ZA-VOGG REVIEW - MEDICAL NECESSITY DENIAL  Phone: [phone]  Fax: [phone]  Email: [email or web address]     TYPE OF REQUEST:  Inpatient Status     ADMISSION INFORMATION:  ADMISSION DATE/TIME: 11/30/21  8:39 AM  PATIENT DIAGNOSIS CODE/DESCRIPTION:  Lifelong obesity [E66 9]  Diabetes mellitus ([facility] [address] ) [E11 9]  Benign essential hypertension [I10]  Esophageal reflux [K21 9]  DISCHARGE DATE/TIME: No discharge date for patient encounter  DISCHARGE DISPOSITION (IF DISCHARGED): Home/Self Care     IMPORTANT INFORMATION:  Please contact the Francesco Chase directly with any questions or concerns regarding this request  Department voicemails are confidential     Send requests for admission clinical reviews, concurrent reviews, approvals, and administrative denials due to lack of clinical to fax [phone]

## 2021-12-01 NOTE — ASSESSMENT & PLAN NOTE
Somewhat improving suspect likely from recent surgery and pain  P r n   Antiemetics  Minimize pain use if able

## 2021-12-01 NOTE — DISCHARGE INSTRUCTIONS
Bariatric/Weight Loss Surgery  Hospital Discharge Instructions  1  ACTIVITY:  a  Progress as feels comfortable - a good rule is:  if you are doing something and it begins to hurt, stop doing the activity  Walk every hour while at home  b  [facility] may walk stairs if you do so slowly  c  You may shower 48 hours after surgery  Do not scrub incision sites  Blot gently with clean towel to dry incisions  (see #4 below)   d  Use your incentive spirometer 10 times per hour while awake for 1 week after surgery  e  Do NOT drive for 48 hours after surgery  No driving 24 hours after taking certain prescription pain medications  Examples of such medication are Percocet, Darvocet, Oxycodone, Tylenol #3, and Tylenol with Codeine  2  DIET  a  Stay on a liquid diet for 7 days after your surgery date, sipping slowly  Refer to your manual for examples of choices  Remember to keep your liquids sugar free or low calorie  You may have protein drinks  Make sure to drink 48 to 64 ounces per day of fluids  b  [facility] may advance to a pureed diet one week after surgery as instructed by your diet progression pamphlet  Once you get approval from your surgeon at your first post operative visit, you may advance to the soft diet and remain on soft diet for 8 weeks unless otherwise instructed  3  MEDICATIONS:  a  The abdominal nerve block will wear off during the first 1-2 days that you are home, and you may become sore (especially over incision site/sites where abdominal wall is sutured)  This may create a pulling sensation, especially while moving around, and will fade over time  Continue to take your Tylenol and your pain medication as instructed  b  Start vitamins and minerals one week after surgery or when you start stage 3/puree diet  c  Anti-acid Medication as per prescription  d  Other medications as indicated on the Physician Patient Discharge Instructions form given to you at the time of discharge    e  Make sure that you are splitting your pill or tablet medications in halves or fourths or even crushing them before you take them  Capsules should be opened and mixed with water or jello  You need to do this for at least 4 weeks after surgery  Eventually you will be able to take your medications the regular way as they were prescribed  Ivis Leticia will need to consult with your Family Doctor in regards to all your prescribed medication, particularly those for blood pressure and diabetes  As you lose weight, medical conditions may change, requiring an alteration or elimination of the drug dose  Monitor blood pressure closely and call PCP with any concerns  g  DO NOT TAKE BIRTH CONTROL(BC) MEDICATIONS, INSERT BC VAGINAL RINGS, OR PLACE IUD OR ANY OTHER BC METHODS UNTIL 31 DAYS FROM DAY OF DISCHARGE FROM HOSPITAL  THIS PLACES YOU AT HIGH RISK FOR A POTENTIALLY LIFE THREATENING BLOOD CLOT  Remember to always use barrier methods for birth control and speak to your GYN about using two forms of birth control to start 31 days after surgery  It is very important to avoid pregnancy until at least 18-24 months after surgery  4  INCISION CARE  a  You may shower and get incisions wet 2 days after surgery  No soaking tub baths or swimming for 30 days after surgery  Keep abdominal area and incisions clean  Use soap and water to create a good lather and rinse off  Do not scrub incisions  b  If you have a drain, empty the drain as the nurses instructed  5  FOLLOW-UP APPOINTMENT should be made for one week after discharge  Call surgeons office at [phone] to schedule an appointment      6  CALL YOUR DOCTOR FOR:  pain not controlled by pain medications, a temperature greater than 101 5° F, any increase or change in drainage or redness from any incision, any vomiting or inability to keep liquids down, shortness of breath, shoulder pain, or bleeding

## 2021-12-01 NOTE — DISCHARGE INSTR - AVS FIRST PAGE
your medications from [address] in [address] or cut your pills and open capsules, mix with liquid to drink    Take Tylenol 650mg every 8 hours around the clock, unless instructed otherwise, as Vicodin has tylenol in it as well  Take your omeprazole daily  It is important to stay hydrated and follow your discharge diet progression   Mild nausea is ok as long as you can drink fluids, sip very slowly and get up and walk during any periods of nausea  You may shower normally after 48 hours, but do not scrub incision sites, blot gently with clean towel to dry incisions  Take home medications as usual unless instructed otherwise while in hospital  Stop metformin, stop bisoprolol  Follow up with Dr Mariel Stratton and your PCP within the next week

## 2021-12-01 NOTE — ASSESSMENT & PLAN NOTE
Lab Results   Component Value Date    HGBA1C 6 0 (H) 04/18/2019       Recent Labs     11/30/21  0605 11/30/21  1014   POCGLU 166* 185*       Previous A1c of 6 5 back in June  Recommend discontinue metformin on discharge  Follow-up PCP, as previously scheduled to consideration of resuming metformin versus changing to different oral diabetic medication

## 2021-12-02 ENCOUNTER — TELEPHONE (OUTPATIENT)
Dept: MEDSURG UNIT | Facility: HOSPITAL | Age: 51
End: 2021-12-02

## 2021-12-02 NOTE — UTILIZATION REVIEW
Notification of Discharge   This is a Notification of Discharge from our facility [address]  Please be advised that this patient has been discharge from our facility  Below you will find the admission and discharge date and time including the patients disposition  UTILIZATION REVIEW CONTACT:  Author Yg  Utilization   Network Utilization Review Department  Phone: [phone] x carefully listen to the prompts  All voicemails are confidential   Email: [email or web address]     PHYSICIAN ADVISORY SERVICES:  FOR SOQZ-AR-PBXI REVIEW - MEDICAL NECESSITY DENIAL  Phone: [phone]  Fax: [phone]  Email: [email or web address]     PRESENTATION DATE: 11/30/2021  5:09 AM    INPATIENT ADMISSION DATE: 11/30/21  8:39 AM   DISCHARGE DATE: 12/1/2021  3:11 PM  DISPOSITION: Home/Self Care Home/Self Care      IMPORTANT INFORMATION:  Send all requests for admission clinical reviews, approved or denied determinations and any other requests to dedicated fax number below belonging to the campus where the patient is receiving treatment   List of dedicated fax numbers:  [address] DENIALS (Administrative/Medical Necessity) [phone]   [address] [place] [address] (Maternity/NICU/Pediatrics) [phone]   [facility] [phone]   130 [facility] [phone]   [address] [place] [address] [phone]   [facility] [address] [facility] [phone]   [facility]  [phone]   [address] [facility] [address]  [address] [facility] [address] [facility] [phone]

## 2021-12-09 ENCOUNTER — OFFICE VISIT (OUTPATIENT)
Dept: BARIATRICS | Facility: CLINIC | Age: 51
End: 2021-12-09

## 2021-12-09 VITALS
DIASTOLIC BLOOD PRESSURE: 76 MMHG | SYSTOLIC BLOOD PRESSURE: 112 MMHG | TEMPERATURE: 96.5 F | WEIGHT: 209 LBS | HEIGHT: 66 IN | BODY MASS INDEX: 33.59 KG/M2 | HEART RATE: 84 BPM

## 2021-12-09 DIAGNOSIS — Z98.84 BARIATRIC SURGERY STATUS: Primary | ICD-10-CM

## 2021-12-09 DIAGNOSIS — E66.9 OBESITY, CLASS II, BMI 35-39.9: ICD-10-CM

## 2021-12-09 PROCEDURE — RECHECK: Performed by: DIETITIAN, REGISTERED

## 2021-12-09 PROCEDURE — 99024 POSTOP FOLLOW-UP VISIT: CPT | Performed by: SURGERY

## 2021-12-09 RX ORDER — OMEPRAZOLE 20 MG/1
20 CAPSULE, DELAYED RELEASE ORAL DAILY
Qty: 30 CAPSULE | Refills: 3 | Status: SHIPPED | OUTPATIENT
Start: 2021-12-09 | End: 2022-03-23

## 2022-03-22 DIAGNOSIS — E66.9 OBESITY, CLASS II, BMI 35-39.9: ICD-10-CM

## 2022-03-23 RX ORDER — OMEPRAZOLE 20 MG/1
CAPSULE, DELAYED RELEASE ORAL
Qty: 30 CAPSULE | Refills: 3 | Status: SHIPPED | OUTPATIENT
Start: 2022-03-23

## 2022-05-04 ENCOUNTER — TELEPHONE (OUTPATIENT)
Dept: BARIATRICS | Facility: CLINIC | Age: 52
End: 2022-05-04

## 2022-05-04 NOTE — TELEPHONE ENCOUNTER
Spoke to patient who stated she does not wish to schedule her 6 month follow-up appointment because she states she is doing well  Patient stated she will call the office when she's ready to schedule an appointment

## 2022-05-04 NOTE — TELEPHONE ENCOUNTER
----- Message from Katie Butterfield RN sent at 2022 10:30 AM EDT -----  Regardin month f/u appointment  Please contact patient BOTH, by phone AND send letter to schedule 6 month follow up appointment  Thank You

## 2022-11-03 ENCOUNTER — APPOINTMENT (OUTPATIENT)
Dept: LAB | Age: 52
End: 2022-11-03

## 2022-11-03 DIAGNOSIS — E03.9 PRIMARY HYPOTHYROIDISM: ICD-10-CM

## 2022-11-03 DIAGNOSIS — Z98.84 BARIATRIC SURGERY STATUS: ICD-10-CM

## 2022-11-03 DIAGNOSIS — E11.9 TYPE 2 DIABETES MELLITUS WITHOUT COMPLICATION, UNSPECIFIED WHETHER LONG TERM INSULIN USE (HCC): ICD-10-CM

## 2022-11-03 LAB
25(OH)D3 SERPL-MCNC: 17 NG/ML (ref 30–100)
ALBUMIN SERPL BCP-MCNC: 3.5 G/DL (ref 3.5–5)
ALP SERPL-CCNC: 109 U/L (ref 46–116)
ALT SERPL W P-5'-P-CCNC: 25 U/L (ref 12–78)
ANION GAP SERPL CALCULATED.3IONS-SCNC: 2 MMOL/L (ref 4–13)
AST SERPL W P-5'-P-CCNC: 20 U/L (ref 5–45)
BASOPHILS # BLD AUTO: 0.03 THOUSANDS/ÂΜL (ref 0–0.1)
BASOPHILS NFR BLD AUTO: 1 % (ref 0–1)
BILIRUB SERPL-MCNC: 0.57 MG/DL (ref 0.2–1)
BUN SERPL-MCNC: 11 MG/DL (ref 5–25)
CALCIUM SERPL-MCNC: 9.2 MG/DL (ref 8.3–10.1)
CHLORIDE SERPL-SCNC: 107 MMOL/L (ref 96–108)
CHOLEST SERPL-MCNC: 152 MG/DL
CO2 SERPL-SCNC: 30 MMOL/L (ref 21–32)
CREAT SERPL-MCNC: 1.07 MG/DL (ref 0.6–1.3)
EOSINOPHIL # BLD AUTO: 0.21 THOUSAND/ÂΜL (ref 0–0.61)
EOSINOPHIL NFR BLD AUTO: 4 % (ref 0–6)
ERYTHROCYTE [DISTWIDTH] IN BLOOD BY AUTOMATED COUNT: 12.8 % (ref 11.6–15.1)
EST. AVERAGE GLUCOSE BLD GHB EST-MCNC: 123 MG/DL
FOLATE SERPL-MCNC: 7.5 NG/ML (ref 3.1–17.5)
GFR SERPL CREATININE-BSD FRML MDRD: 59 ML/MIN/1.73SQ M
GLUCOSE P FAST SERPL-MCNC: 108 MG/DL (ref 65–99)
HBA1C MFR BLD: 5.9 %
HCT VFR BLD AUTO: 46.5 % (ref 34.8–46.1)
HDLC SERPL-MCNC: 61 MG/DL
HGB BLD-MCNC: 14.5 G/DL (ref 11.5–15.4)
IMM GRANULOCYTES # BLD AUTO: 0.01 THOUSAND/UL (ref 0–0.2)
IMM GRANULOCYTES NFR BLD AUTO: 0 % (ref 0–2)
LDLC SERPL CALC-MCNC: 73 MG/DL (ref 0–100)
LYMPHOCYTES # BLD AUTO: 1.84 THOUSANDS/ÂΜL (ref 0.6–4.47)
LYMPHOCYTES NFR BLD AUTO: 31 % (ref 14–44)
MCH RBC QN AUTO: 30.1 PG (ref 26.8–34.3)
MCHC RBC AUTO-ENTMCNC: 31.2 G/DL (ref 31.4–37.4)
MCV RBC AUTO: 97 FL (ref 82–98)
MONOCYTES # BLD AUTO: 0.48 THOUSAND/ÂΜL (ref 0.17–1.22)
MONOCYTES NFR BLD AUTO: 8 % (ref 4–12)
NEUTROPHILS # BLD AUTO: 3.29 THOUSANDS/ÂΜL (ref 1.85–7.62)
NEUTS SEG NFR BLD AUTO: 56 % (ref 43–75)
NONHDLC SERPL-MCNC: 91 MG/DL
NRBC BLD AUTO-RTO: 0 /100 WBCS
PLATELET # BLD AUTO: 314 THOUSANDS/UL (ref 149–390)
PMV BLD AUTO: 9.8 FL (ref 8.9–12.7)
POTASSIUM SERPL-SCNC: 4.2 MMOL/L (ref 3.5–5.3)
PROT SERPL-MCNC: 6.8 G/DL (ref 6.4–8.4)
RBC # BLD AUTO: 4.81 MILLION/UL (ref 3.81–5.12)
SODIUM SERPL-SCNC: 139 MMOL/L (ref 135–147)
TRIGL SERPL-MCNC: 92 MG/DL
TSH SERPL DL<=0.05 MIU/L-ACNC: 1.73 UIU/ML (ref 0.45–4.5)
VIT B12 SERPL-MCNC: 247 PG/ML (ref 100–900)
WBC # BLD AUTO: 5.86 THOUSAND/UL (ref 4.31–10.16)

## 2022-11-08 LAB
VIT B1 BLD-SCNC: 129.2 NMOL/L (ref 66.5–200)
VIT B6 SERPL-MCNC: 5.1 UG/L (ref 3.4–65.2)

## 2022-11-11 LAB
NIACIN SERPL-MCNC: <5 NG/ML (ref 0–5)
NICOTINAMIDE SERPL-MCNC: 9.2 NG/ML (ref 5.2–72.1)

## 2022-11-15 NOTE — PROGRESS NOTES
Pt refused to remove contact lenses from eyes  Dr Segun Agarwal spoke with pt about refusal  Verbalized understanding 
NEGATIVE

## 2022-11-16 LAB — VIT A SERPL-MCNC: 37.8 UG/DL (ref 20.1–62)

## 2023-03-31 ENCOUNTER — TELEPHONE (OUTPATIENT)
Dept: BARIATRICS | Facility: CLINIC | Age: 53
End: 2023-03-31

## 2023-12-27 ENCOUNTER — HOSPITAL ENCOUNTER (EMERGENCY)
Facility: HOSPITAL | Age: 53
Discharge: HOME | End: 2023-12-27
Attending: EMERGENCY MEDICINE
Payer: COMMERCIAL

## 2023-12-27 ENCOUNTER — OFFICE VISIT (OUTPATIENT)
Dept: URGENT CARE | Age: 53
End: 2023-12-27
Payer: COMMERCIAL

## 2023-12-27 VITALS
DIASTOLIC BLOOD PRESSURE: 78 MMHG | TEMPERATURE: 98.6 F | RESPIRATION RATE: 18 BRPM | SYSTOLIC BLOOD PRESSURE: 130 MMHG | HEART RATE: 80 BPM | OXYGEN SATURATION: 95 %

## 2023-12-27 VITALS
DIASTOLIC BLOOD PRESSURE: 105 MMHG | SYSTOLIC BLOOD PRESSURE: 159 MMHG | HEART RATE: 94 BPM | RESPIRATION RATE: 18 BRPM | TEMPERATURE: 97.2 F | OXYGEN SATURATION: 97 %

## 2023-12-27 DIAGNOSIS — R55 VASOVAGAL SYNCOPE: ICD-10-CM

## 2023-12-27 DIAGNOSIS — H66.90 OTITIS MEDIA: Primary | ICD-10-CM

## 2023-12-27 DIAGNOSIS — H60.90 OTITIS EXTERNA: ICD-10-CM

## 2023-12-27 DIAGNOSIS — R55 SYNCOPE, NEAR: Primary | ICD-10-CM

## 2023-12-27 LAB
ANION GAP SERPL CALCULATED.3IONS-SCNC: 5 MMOL/L
ATRIAL RATE: 82 BPM
ATRIAL RATE: 92 BPM
BASOPHILS # BLD AUTO: 0.06 THOUSANDS/ÂΜL (ref 0–0.1)
BASOPHILS NFR BLD AUTO: 1 % (ref 0–1)
BUN SERPL-MCNC: 13 MG/DL (ref 5–25)
CALCIUM SERPL-MCNC: 8.8 MG/DL (ref 8.4–10.2)
CHLORIDE SERPL-SCNC: 105 MMOL/L (ref 96–108)
CO2 SERPL-SCNC: 29 MMOL/L (ref 21–32)
CREAT SERPL-MCNC: 0.91 MG/DL (ref 0.6–1.3)
D DIMER PPP FEU-MCNC: 0.33 UG/ML FEU
EOSINOPHIL # BLD AUTO: 0.25 THOUSAND/ÂΜL (ref 0–0.61)
EOSINOPHIL NFR BLD AUTO: 3 % (ref 0–6)
ERYTHROCYTE [DISTWIDTH] IN BLOOD BY AUTOMATED COUNT: 13.4 % (ref 11.6–15.1)
GFR SERPL CREATININE-BSD FRML MDRD: 71 ML/MIN/1.73SQ M
GLUCOSE SERPL-MCNC: 112 MG/DL (ref 65–140)
HCT VFR BLD AUTO: 42.2 % (ref 34.8–46.1)
HGB BLD-MCNC: 13.3 G/DL (ref 11.5–15.4)
IMM GRANULOCYTES # BLD AUTO: 0.04 THOUSAND/UL (ref 0–0.2)
IMM GRANULOCYTES NFR BLD AUTO: 0 % (ref 0–2)
LYMPHOCYTES # BLD AUTO: 2.14 THOUSANDS/ÂΜL (ref 0.6–4.47)
LYMPHOCYTES NFR BLD AUTO: 21 % (ref 14–44)
MCH RBC QN AUTO: 27.3 PG (ref 26.8–34.3)
MCHC RBC AUTO-ENTMCNC: 31.5 G/DL (ref 31.4–37.4)
MCV RBC AUTO: 87 FL (ref 82–98)
MONOCYTES # BLD AUTO: 0.6 THOUSAND/ÂΜL (ref 0.17–1.22)
MONOCYTES NFR BLD AUTO: 6 % (ref 4–12)
NEUTROPHILS # BLD AUTO: 7.07 THOUSANDS/ÂΜL (ref 1.85–7.62)
NEUTS SEG NFR BLD AUTO: 69 % (ref 43–75)
NRBC BLD AUTO-RTO: 0 /100 WBCS
P AXIS: 44 DEGREES
P AXIS: 47 DEGREES
PLATELET # BLD AUTO: 312 THOUSANDS/UL (ref 149–390)
PMV BLD AUTO: 9.1 FL (ref 8.9–12.7)
POTASSIUM SERPL-SCNC: 4.3 MMOL/L (ref 3.5–5.3)
PR INTERVAL: 116 MS
PR INTERVAL: 118 MS
QRS AXIS: 50 DEGREES
QRS AXIS: 75 DEGREES
QRSD INTERVAL: 76 MS
QRSD INTERVAL: 82 MS
QT INTERVAL: 350 MS
QT INTERVAL: 366 MS
QTC INTERVAL: 427 MS
QTC INTERVAL: 432 MS
RBC # BLD AUTO: 4.88 MILLION/UL (ref 3.81–5.12)
SODIUM SERPL-SCNC: 139 MMOL/L (ref 135–147)
T WAVE AXIS: 23 DEGREES
T WAVE AXIS: 9 DEGREES
VENTRICULAR RATE: 82 BPM
VENTRICULAR RATE: 92 BPM
WBC # BLD AUTO: 10.16 THOUSAND/UL (ref 4.31–10.16)

## 2023-12-27 PROCEDURE — 99284 EMERGENCY DEPT VISIT MOD MDM: CPT

## 2023-12-27 PROCEDURE — 85379 FIBRIN DEGRADATION QUANT: CPT

## 2023-12-27 PROCEDURE — 93005 ELECTROCARDIOGRAM TRACING: CPT

## 2023-12-27 PROCEDURE — 36415 COLL VENOUS BLD VENIPUNCTURE: CPT

## 2023-12-27 PROCEDURE — 80048 BASIC METABOLIC PNL TOTAL CA: CPT

## 2023-12-27 PROCEDURE — 99285 EMERGENCY DEPT VISIT HI MDM: CPT | Performed by: EMERGENCY MEDICINE

## 2023-12-27 PROCEDURE — 85025 COMPLETE CBC W/AUTO DIFF WBC: CPT

## 2023-12-27 PROCEDURE — 99215 OFFICE O/P EST HI 40 MIN: CPT

## 2023-12-27 RX ORDER — OFLOXACIN 3 MG/ML
5 SOLUTION AURICULAR (OTIC) 2 TIMES DAILY
Qty: 5 ML | Refills: 0 | Status: SHIPPED | OUTPATIENT
Start: 2023-12-27

## 2023-12-27 RX ORDER — AMOXICILLIN AND CLAVULANATE POTASSIUM 875; 125 MG/1; MG/1
1 TABLET, FILM COATED ORAL ONCE
Status: COMPLETED | OUTPATIENT
Start: 2023-12-27 | End: 2023-12-27

## 2023-12-27 RX ORDER — AMOXICILLIN AND CLAVULANATE POTASSIUM 875; 125 MG/1; MG/1
1 TABLET, FILM COATED ORAL EVERY 12 HOURS
Qty: 14 TABLET | Refills: 0 | Status: SHIPPED | OUTPATIENT
Start: 2023-12-27 | End: 2024-01-03

## 2023-12-27 RX ADMIN — AMOXICILLIN AND CLAVULANATE POTASSIUM 1 TABLET: 875; 125 TABLET, FILM COATED ORAL at 20:22

## 2023-12-27 NOTE — PROGRESS NOTES
[facility]        NAME: Marva Salgado is a 53 y.o. female  : 1970    MRN: [id number]  DATE: 2023  TIME: 6:52 PM    Assessment and Plan   Syncope, near [R55]  1. Syncope, near  Transfer to other facility      Upon ambulating to room 10 to be evaluated, patient had near syncopal episode using walker.  Patient became pale diaphoretic and dizzy.  Patient assisted to exam table, laid supine, vital signs taken.  Per patient's own Dexcom blood sugar 132 mg/dL.  Patient states she is presenting for bilateral ear pain, with increasing dizziness and syncope spells.  Patient states she has a known pituitary tumor, and is in the process of being worked up for [place]'s disease.  Patient was instructed by her PCP that if she becomes sick she should be reporting to the hospital.  Given this patient increased episodes of dizziness, syncope, and bilateral otitis media, EMS called to transfer patient to [facility] to help her patient's request.    At time of EMS arrival EKG completed, interpreted by me: Sinus rhythm rate of 92, IA interval 118, QRS duration 82, QT segment 350.  Patient with inverted T waves in lead III, no acute ST elevations, no change from previous EKG.  Patient reports feeling improved lying supine.  Vital signs reassessed blood pressure 159/105, heart rate 94, 98% on room air, respiratory rate of 18, patient alert and oriented x 3.   of patient at bedside.  Will follow ambulance to ER via POV.  Patient left clinic via EMS on stretcher      Patient Instructions       Follow up with PCP in 3-5 days.  Proceed to  ER if symptoms worsen.    Chief Complaint     Chief Complaint   Patient presents with   • Dizziness   • Earache     Dizzy for 1 month and both ear pain for couple of days.         History of Present Illness       HPI    Review of Systems   Review of Systems   Constitutional:  Positive for fever.   HENT:  Negative for congestion.    Neurological:  Positive for  dizziness (for 1 month, increasingly worse), syncope and headaches.         Current Medications       Current Outpatient Medications:   •  albuterol (PROVENTIL HFA,VENTOLIN HFA) 90 mcg/act inhaler, Inhale 2 puffs every 4 (four) hours as needed  , Disp: , Rfl:   •  DULoxetine (CYMBALTA) 60 mg delayed release capsule, Take 60 mg by mouth every evening  , Disp: , Rfl:   •  EPINEPHrine (EPIPEN) 0.3 mg/0.3 mL SOAJ, , Disp: , Rfl:   •  gabapentin (NEURONTIN) 300 mg capsule, Take 300 mg by mouth daily In am , Disp: , Rfl:   •  gabapentin (NEURONTIN) 600 MG tablet, Take 1,200 mg by mouth daily at bedtime  , Disp: , Rfl:   •  HYDROcodone-acetaminophen (HYCET) 7.5-325 MG/15ML solution, Take 7.5 mL by mouth every 6 (six) hours as needed for moderate pain for up to 10 doses Max Daily Amount: 30 mL, Disp: 75 mL, Rfl: 0  •  omeprazole (PriLOSEC) 20 mg delayed release capsule, TAKE 1 CAPSULE DAILY, Disp: 30 capsule, Rfl: 3    Current Allergies     Allergies as of 12/27/2023 - Reviewed 12/27/2023   Allergen Reaction Noted   • Iodine - food allergy Anaphylaxis 01/12/2017   • Lyrica [pregabalin] Other (See Comments) 11/30/2021   • Shellfish allergy - food allergy Anaphylaxis 12/12/2013   • Wellbutrin [bupropion] Hives 10/03/2018   • Wound dressing adhesive Hives 11/30/2021   • Latex Hives 12/12/2013   • Percocet [oxycodone-acetaminophen] Itching 10/03/2018            The following portions of the patient's history were reviewed and updated as appropriate: allergies, current medications, past family history, past medical history, past social history, past surgical history and problem list.     Past Medical History:   Diagnosis Date   • Arthritis    • Asthma    • CPAP (continuous positive airway pressure) dependence    • Depression    • Diabetes mellitus (HCC)    • Gait disturbance     uses w/c for long distance   • Gastritis    • Hiatal hernia    • Hx of fusion of cervical spine    • Hypertension    • Migraines    • Morbid obesity  (HCC)    • PONV (postoperative nausea and vomiting)     difficulty awakening- too much CO2   • Pulmonary emboli (HCC)     hx blood clot also in neck   • S/P insertion of spinal cord stimulator    • Sleep apnea    • Tachycardia    • Wears contact lenses        Past Surgical History:   Procedure Laterality Date   • COLONOSCOPY     • DISCECTOMY SPINE CERVICAL POSTERIOR     • HYSTERECTOMY      and bladder sling   • JOINT REPLACEMENT      left TKR and partial right   • NECK SURGERY      cervical fusion   • AL LAPS GSTR RSTCV PX W/BYP GWYN-EN-Y LIMB <150 CM N/A 11/30/2021    Procedure: ROBOTIC BYPASS GASTRIC GWYN-EN-Y,INTRAOP EGD, PARAESOPHAGEAL HERNIA REPAIR W/ MESH;  Surgeon: Gabriel Gutierrez MD;  Location: [facility];  Service: Bariatrics   • SHOULDER OPEN ROTATOR CUFF REPAIR     • SPINAL CORD STIMULATOR IMPLANT         Family History   Problem Relation Age of Onset   • Arthritis Mother    • Asthma Mother    • Depression Mother    • Heart disease Father    • Diabetes Father    • Diabetes Sister    • Heart disease Paternal Uncle    • Heart disease Paternal Grandfather          Medications have been verified.        Objective   BP (!) 159/105   Pulse 94   Temp (!) 97.2 °F (36.2 °C) (Temporal)   Resp 18   SpO2 97%   No LMP recorded. Patient has had a hysterectomy.       Physical Exam     Physical Exam  Vitals and nursing note reviewed.   Constitutional:       General: She is in acute distress.      Appearance: She is ill-appearing.   HENT:      Right Ear: Tenderness present. Tympanic membrane is erythematous.      Left Ear: Tenderness present. Tympanic membrane is erythematous and bulging.      Ears:        Nose: No congestion.      Mouth/Throat:      Mouth: Mucous membranes are moist.   Eyes:      Pupils: Pupils are equal, round, and reactive to light.   Cardiovascular:      Rate and Rhythm: Normal rate and regular rhythm.      Pulses: Normal pulses.      Heart sounds: Normal heart sounds.   Pulmonary:      Effort:  Pulmonary effort is normal.      Breath sounds: Normal breath sounds.   Neurological:      Mental Status: She is alert and oriented to person, place, and time.      Motor: Weakness present.   Psychiatric:         Mood and Affect: Mood normal.         Behavior: Behavior normal.

## 2023-12-28 NOTE — ED ATTENDING ATTESTATION
"I, Keegan Callejas MD, saw and evaluated the patient. I have discussed the patient with the resident and agree with the resident's findings, Plan of Care, and MDM as documented in the resident's note, except where noted. All available labs and Radiology studies were reviewed.  I was present for key portions of any procedure(s) performed by the resident and I was immediately available to provide assistance.    At this point I agree with the current assessment done in the Emergency Department.  I have conducted an independent evaluation of this patient a history and physical is as follows:    54 yo morbidly obese female with a history of DM, HTN, asthma, CHIDI, depression, chronic back pain, and migraine headaches brought to the ED by EMS for evaluation s/p a syncopal event at a local urgent care. The patient was seeking treatment for an ear infection. The patient stood up suddenly after sitting for a prolonged period then became \"clammy and lightheaded\" before transiently losing consciousness. No fall or head trauma. The patient returned to her mental status baseline immediately after regaining consciousness. No seizure-like activity. No tongue bite or incontinence. No associated chest pain, shortness of breath, nausea, vomiting, or visual disturbance. 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 No other specific complaints.    ROS: per resident physician note    Gen: NAD, AA&Ox3  HEENT: PERRL, EOMI  Right Ear: (+) patchy erythema and tenderness to external ear, (+) canal inflammation, TM clear  Neck: supple  CV: RRR  Lungs: CTA B/L  Abdomen: soft, NT/ND  Ext: no swelling or deformity  Neuro: 5/5 strength all extremities, sensation grossly intact, steady gait  Skin: no rash    ED Course  The patient is is comfortable appearing with stable vital signs. Neurologic examination is benign. The patient has " experienced multiple syncopal and presyncopal events x 3 months. She is currently undergoing an extensive outpatient workup. (+) Erythema and tenderness to external right ear and canal --> possible otitis externa vs cellulitis? Plan to treat with a course of oral and topical antibiotics. Will check EKG, basic labs, and d dimer. Disposition per workup and reassessment. Will continue to monitor in the ED.      Critical Care Time  Procedures

## 2023-12-28 NOTE — ED PROVIDER NOTES
Chief Complaint   Patient presents with    Syncope     Per EMS, pt had near syncopal episode at [facility]. Pt went there today to be treated for b/l ear infection. Pt states near syncope/syncopal episodes are ongoing since September. Pt being worked up for Newbury's disease by endocrinologist.      History of Present Illness and Review of Systems   This is a 53 y.o. female with PMH significant for asthma, DM, CHIDI coming in today with complaint of syncope.  Patient reports she was at an urgent care getting evaluated for an ear infection when she syncopized.  The patient reportedly was sitting down began to sit up, and felt clammy and lightheaded and then lost consciousness for less than 1 minute.  She rapidly reoriented and and was transported here without difficulty.  Reports she has been having intermittent episodes of lightheadedness and near syncope like this.  She is being worked up by an endocrinologist over concerns of Abdoulaye's disease which she believes may be related.  No concerns for traumatic head injuries, nausea vomiting, chest pain shortness of breath or abdominal pain.  She has a history of PE but is not currently anticoagulated. No history of CHF or cardiac disease.  Denies any recent hospitalizations or surgeries. No family history of sudden cardiac death. No other symptoms.    - No language barrier.     No other complaints for this encounter.    Remainder of ROS Reviewed and Non-Pertinent    Past Medical, Past Surgical History:    has a past medical history of Arthritis, Asthma, CPAP (continuous positive airway pressure) dependence, Depression, Diabetes mellitus (HCC), Gait disturbance, Gastritis, Hiatal hernia, fusion of cervical spine, Hypertension, Migraines, Morbid obesity (HCC), PONV (postoperative nausea and vomiting), Pulmonary emboli (HCC), S/P insertion of spinal cord stimulator, Sleep apnea, Tachycardia, and Wears contact lenses.   has a past surgical history that includes Shoulder  open rotator cuff repair; DISCECTOMY SPINE CERVICAL POSTERIOR; Spinal cord stimulator implant; Neck surgery; Hysterectomy; Joint replacement; Colonoscopy; and pr laps gstr rstcv px w/byp michelle-en-y limb <150 cm (N/A, 11/30/2021).     Allergies:     Allergies   Allergen Reactions    Iodine - Food Allergy Anaphylaxis           Lyrica [Pregabalin] Other (See Comments)     Suicidal ideations    Shellfish Allergy - Food Allergy Anaphylaxis    Wellbutrin [Bupropion] Hives     Shaky and dizzy    Wound Dressing Adhesive Hives     Including tegaderm dressing, paper tape ok but can react to it     Latex Hives    Percocet [Oxycodone-Acetaminophen] Itching       Social and Family History:     Social History     Substance and Sexual Activity   Alcohol Use Yes    Comment: few x year     Social History     Tobacco Use   Smoking Status Never   Smokeless Tobacco Never     Social History     Substance and Sexual Activity   Drug Use No       Physical Examination     Vitals:    12/27/23 1916 12/27/23 1922   BP: 130/78    BP Location: Right arm    Pulse: 80    Resp: 18    Temp:  98.6 °F (37 °C)   TempSrc:  Oral   SpO2: 95%        Physical Exam  Vitals and nursing note reviewed.   Constitutional:       General: She is not in acute distress.     Appearance: She is well-developed.   HENT:      Head: Normocephalic and atraumatic.      Right Ear: Tympanic membrane is erythematous and bulging.      Left Ear: Tympanic membrane is erythematous and bulging.      Ears:      Comments: Patchy erythema to the external right ear, no evidence of bulging tympanic membranes or fixation  Eyes:      Conjunctiva/sclera: Conjunctivae normal.   Cardiovascular:      Rate and Rhythm: Normal rate and regular rhythm.      Heart sounds: No murmur heard.  Pulmonary:      Effort: Pulmonary effort is normal. No respiratory distress.      Breath sounds: Normal breath sounds.   Abdominal:      Palpations: Abdomen is soft.      Tenderness: There is no abdominal  tenderness. There is no guarding or rebound.   Musculoskeletal:         General: No swelling.      Cervical back: Neck supple.   Skin:     General: Skin is warm and dry.      Capillary Refill: Capillary refill takes less than 2 seconds.   Neurological:      General: No focal deficit present.      Mental Status: She is alert.   Psychiatric:         Mood and Affect: Mood normal.           Procedures   Procedures      MDM:   Medical Decision Making  Marva Salgado is a 53 y.o. who presents with complaints of syncope    Vital signs are stable, physical exam shows patient has patchy erythema to right external ear canal, no evidence of mastoid tenderness, tympanic membrane appears within normal limits, otherwise appears at her baseline health status    Ddx: Clinically this sounds consistent with orthostatic versus vasovagal syncope.  Cannot exclude PE either given her lack of anticoagulant use.  Doubtful to be related to ACS or dysrhythmia however will evaluate EKG.  Will evaluate for electrolyte abnormality as well.    Plan: Workup will include CBC, CMP, D-dimer, EKG. Will monitor closely and reassess.  Will treat empirically for otitis externa with    Reassessment/Disposition: Workup unremarkable for any acute abnormality.  Patient well-appearing, will treat empirically for otitis media and otitis externa and recommend outpatient follow-up.  No indication for admission or further testing at this time.        Amount and/or Complexity of Data Reviewed  Labs: ordered. Decision-making details documented in ED Course.    Risk  Prescription drug management.        - Reviewed relevant past office visits/hospitalizations/procedures  -Obtained pertinent history that influenced decision making from the pt and family    ED Course as of 12/27/23 2054   Wed Dec 27, 2023   1956 My EKG read: NSR, no ST or T wave abnormalities, normal intervals, normal axis, no evidence of ischemia     2008 D-Dimer, Quant: 0.33   2008 eGFR: 71   2008  D-dimer negative, patient well-appearing at this time.  Will prescribe ofloxacin for otitis externa and discharged with return precautions.      Final Dispo   Final Diagnosis:  1. Otitis media    2. Otitis externa    3. Vasovagal syncope      Time reflects when diagnosis was documented in both MDM as applicable and the Disposition within this note       Time User Action Codes Description Comment    12/27/2023  8:12 PM Catracho Padilla Add [H66.90] Otitis media     12/27/2023  8:16 PM Padilla Hayden Add [H60.90] Otitis externa     12/27/2023  8:17 PM ShannonPadilla anderson Add [R55] Vasovagal syncope           ED Disposition       ED Disposition   Discharge    Condition   Stable    Date/Time   Wed Dec 27, 2023  8:16 PM    Comment   Marva Salgado discharge to home/self care.                   Follow-up Information    None       Medications   amoxicillin-clavulanate (AUGMENTIN) 875-125 mg per tablet 1 tablet (1 tablet Oral Given 12/27/23 2022)       Risk Stratification Tools                Orders Placed This Encounter   Procedures    CBC and differential    Basic metabolic panel    D-Dimer    Insert peripheral IV    ECG 12 lead       Labs:     Labs Reviewed   D-DIMER, QUANTITATIVE - Normal       Result Value Ref Range Status    D-Dimer, Quant 0.33  <0.50 ug/ml FEU Final    Comment: Reference and upper limits to exclude DVT and PE are the same.  Do not use to exclude if clinical symptoms are present.  Pregnant women:  1st trimester:  <0.22 - 1.06 ug/ml FEU  2nd trimester:  <0.22 - 1.88 ug/ml FEU  3rd trimester:   0.24 - 3.28 ug/ml FEU    Note: Normal ranges may not apply to patients who are transgender, non-binary, or whose legal sex, sex at birth, and gender identity differ.      Narrative:     In the evaluation for possible pulmonary embolism, in the appropriate (Well's Score of 4 or less) patient, the age adjusted d-dimer cutoff for this patient can be calculated as:    Age x 0.01 (in ug/mL) for Age-adjusted D-dimer  exclusion threshold for a patient over 50 years.   CBC AND DIFFERENTIAL    WBC 10.16  4.31 - 10.16 Thousand/uL Final    RBC 4.88  3.81 - 5.12 Million/uL Final    Hemoglobin 13.3  11.5 - 15.4 g/dL Final    Hematocrit 42.2  34.8 - 46.1 % Final    MCV 87  82 - 98 fL Final    MCH 27.3  26.8 - 34.3 pg Final    MCHC 31.5  31.4 - 37.4 g/dL Final    RDW 13.4  11.6 - 15.1 % Final    MPV 9.1  8.9 - 12.7 fL Final    Platelets 312  149 - 390 Thousands/uL Final    nRBC 0  /100 WBCs Final    Neutrophils Relative 69  43 - 75 % Final    Immat GRANS % 0  0 - 2 % Final    Lymphocytes Relative 21  14 - 44 % Final    Monocytes Relative 6  4 - 12 % Final    Eosinophils Relative 3  0 - 6 % Final    Basophils Relative 1  0 - 1 % Final    Neutrophils Absolute 7.07  1.85 - 7.62 Thousands/µL Final    Immature Grans Absolute 0.04  0.00 - 0.20 Thousand/uL Final    Lymphocytes Absolute 2.14  0.60 - 4.47 Thousands/µL Final    Monocytes Absolute 0.60  0.17 - 1.22 Thousand/µL Final    Eosinophils Absolute 0.25  0.00 - 0.61 Thousand/µL Final    Basophils Absolute 0.06  0.00 - 0.10 Thousands/µL Final   BASIC METABOLIC PANEL    Sodium 139  135 - 147 mmol/L Final    Potassium 4.3  3.5 - 5.3 mmol/L Final    Chloride 105  96 - 108 mmol/L Final    CO2 29  21 - 32 mmol/L Final    ANION GAP 5  mmol/L Final    BUN 13  5 - 25 mg/dL Final    Creatinine 0.91  0.60 - 1.30 mg/dL Final    Comment: Standardized to IDMS reference method    Glucose 112  65 - 140 mg/dL Final    Comment: If the patient is fasting, the ADA then defines impaired fasting glucose as > 100 mg/dL and diabetes as > or equal to 123 mg/dL.    Calcium 8.8  8.4 - 10.2 mg/dL Final    eGFR 71  ml/min/1.73sq m Final    Narrative:     National Kidney Disease Foundation guidelines for Chronic Kidney Disease (CKD):     Stage 1 with normal or high GFR (GFR > 90 mL/min/1.73 square meters)    Stage 2 Mild CKD (GFR = 60-89 mL/min/1.73 square meters)    Stage 3A Moderate CKD (GFR = 45-59 mL/min/1.73 square  "meters)    Stage 3B Moderate CKD (GFR = 30-44 mL/min/1.73 square meters)    Stage 4 Severe CKD (GFR = 15-29 mL/min/1.73 square meters)    Stage 5 End Stage CKD (GFR <15 mL/min/1.73 square meters)  Note: GFR calculation is accurate only with a steady state creatinine       Imaging:     No orders to display      All details of the evaluation and treatment plan were made clear and additionally all questions and concerns were addressed while under my care.    Portions of the record may have been created with voice recognition software. Occasional wrong word or \"sound a like\" substitutions may have occurred due to the inherent limitations of voice recognition software. Read the chart carefully and recognize, using context, where substitutions have occurred.    The attending physician physically available and evaluated the above patient alongside myself.      Padilla Hayden MD  12/27/23 2054    "

## 2023-12-28 NOTE — DISCHARGE INSTRUCTIONS
Your workup here was not concerning for anything dangerous. Therefore there is no need for you to stay at the hospital for further testing. We feel safe to send you home.    You can use Ofloxicin, Augmentin for management of your symptoms.    You should follow up with your PCP to assess for resolution of your symptoms and to determine if there is any further evaluation that needs to be performed.    Return to the emergency department if you have any symptoms of worsening pain or fevers    Thank you for choosing [facility] for your care!

## 2025-02-03 ENCOUNTER — TRANSCRIBE ORDERS (OUTPATIENT)
Dept: ADMINISTRATIVE | Age: 55
End: 2025-02-03

## 2025-02-03 ENCOUNTER — APPOINTMENT (OUTPATIENT)
Dept: LAB | Age: 55
End: 2025-02-03
Payer: MEDICARE

## 2025-02-03 DIAGNOSIS — E78.2 MIXED HYPERLIPIDEMIA: ICD-10-CM

## 2025-02-03 DIAGNOSIS — E53.8 BIOTIN-(PROPIONYL-COA-CARBOXYLASE) LIGASE DEFICIENCY: ICD-10-CM

## 2025-02-03 DIAGNOSIS — E11.69 TYPE 2 DIABETES MELLITUS WITH OTHER SPECIFIED COMPLICATION, UNSPECIFIED WHETHER LONG TERM INSULIN USE (HCC): ICD-10-CM

## 2025-02-03 DIAGNOSIS — E53.0 ARIBOFLAVINOSIS: ICD-10-CM

## 2025-02-03 DIAGNOSIS — E55.9 VITAMIN D DEFICIENCY: ICD-10-CM

## 2025-02-03 DIAGNOSIS — I10 ESSENTIAL HYPERTENSION: ICD-10-CM

## 2025-02-03 DIAGNOSIS — E53.0 ARIBOFLAVINOSIS: Primary | ICD-10-CM

## 2025-02-03 DIAGNOSIS — E51.9 THIAMIN DEFICIENCY: ICD-10-CM

## 2025-02-03 LAB
25(OH)D3 SERPL-MCNC: 49.3 NG/ML (ref 30–100)
ALBUMIN SERPL BCG-MCNC: 4.3 G/DL (ref 3.5–5)
ALP SERPL-CCNC: 135 U/L (ref 34–104)
ALT SERPL W P-5'-P-CCNC: 14 U/L (ref 7–52)
ANION GAP SERPL CALCULATED.3IONS-SCNC: 14 MMOL/L (ref 4–13)
AST SERPL W P-5'-P-CCNC: 22 U/L (ref 13–39)
BASOPHILS # BLD AUTO: 0.03 THOUSANDS/ΜL (ref 0–0.1)
BASOPHILS NFR BLD AUTO: 0 % (ref 0–1)
BILIRUB SERPL-MCNC: 0.7 MG/DL (ref 0.2–1)
BUN SERPL-MCNC: 11 MG/DL (ref 5–25)
CALCIUM SERPL-MCNC: 9.4 MG/DL (ref 8.4–10.2)
CHLORIDE SERPL-SCNC: 105 MMOL/L (ref 96–108)
CHOLEST SERPL-MCNC: 117 MG/DL (ref ?–200)
CO2 SERPL-SCNC: 22 MMOL/L (ref 21–32)
CREAT SERPL-MCNC: 1 MG/DL (ref 0.6–1.3)
EOSINOPHIL # BLD AUTO: 0.19 THOUSAND/ΜL (ref 0–0.61)
EOSINOPHIL NFR BLD AUTO: 3 % (ref 0–6)
ERYTHROCYTE [DISTWIDTH] IN BLOOD BY AUTOMATED COUNT: 16 % (ref 11.6–15.1)
EST. AVERAGE GLUCOSE BLD GHB EST-MCNC: 128 MG/DL
FERRITIN SERPL-MCNC: 11 NG/ML (ref 11–307)
FOLATE SERPL-MCNC: 11 NG/ML
GFR SERPL CREATININE-BSD FRML MDRD: 63 ML/MIN/1.73SQ M
GLUCOSE P FAST SERPL-MCNC: 145 MG/DL (ref 65–99)
HBA1C MFR BLD: 6.1 %
HCT VFR BLD AUTO: 37.4 % (ref 34.8–46.1)
HDLC SERPL-MCNC: 66 MG/DL
HGB BLD-MCNC: 10.7 G/DL (ref 11.5–15.4)
IMM GRANULOCYTES # BLD AUTO: 0.01 THOUSAND/UL (ref 0–0.2)
IMM GRANULOCYTES NFR BLD AUTO: 0 % (ref 0–2)
IRON SATN MFR SERPL: 6 % (ref 15–50)
IRON SERPL-MCNC: 31 UG/DL (ref 50–212)
LDLC SERPL CALC-MCNC: 35 MG/DL (ref 0–100)
LYMPHOCYTES # BLD AUTO: 3.04 THOUSANDS/ΜL (ref 0.6–4.47)
LYMPHOCYTES NFR BLD AUTO: 43 % (ref 14–44)
MCH RBC QN AUTO: 23.3 PG (ref 26.8–34.3)
MCHC RBC AUTO-ENTMCNC: 28.6 G/DL (ref 31.4–37.4)
MCV RBC AUTO: 82 FL (ref 82–98)
MONOCYTES # BLD AUTO: 0.48 THOUSAND/ΜL (ref 0.17–1.22)
MONOCYTES NFR BLD AUTO: 7 % (ref 4–12)
NEUTROPHILS # BLD AUTO: 3.35 THOUSANDS/ΜL (ref 1.85–7.62)
NEUTS SEG NFR BLD AUTO: 47 % (ref 43–75)
NONHDLC SERPL-MCNC: 51 MG/DL
NRBC BLD AUTO-RTO: 0 /100 WBCS
PLATELET # BLD AUTO: 438 THOUSANDS/UL (ref 149–390)
PMV BLD AUTO: 9.8 FL (ref 8.9–12.7)
POTASSIUM SERPL-SCNC: 3.7 MMOL/L (ref 3.5–5.3)
PROT SERPL-MCNC: 6.7 G/DL (ref 6.4–8.4)
RBC # BLD AUTO: 4.59 MILLION/UL (ref 3.81–5.12)
SODIUM SERPL-SCNC: 141 MMOL/L (ref 135–147)
TIBC SERPL-MCNC: 502.6 UG/DL (ref 250–450)
TRANSFERRIN SERPL-MCNC: 359 MG/DL (ref 203–362)
TRIGL SERPL-MCNC: 82 MG/DL (ref ?–150)
TSH SERPL DL<=0.05 MIU/L-ACNC: 1.61 UIU/ML (ref 0.45–4.5)
UIBC SERPL-MCNC: 472 UG/DL (ref 155–355)
VIT B12 SERPL-MCNC: 311 PG/ML (ref 180–914)
WBC # BLD AUTO: 7.1 THOUSAND/UL (ref 4.31–10.16)

## 2025-02-03 PROCEDURE — 83036 HEMOGLOBIN GLYCOSYLATED A1C: CPT

## 2025-02-03 PROCEDURE — 82728 ASSAY OF FERRITIN: CPT

## 2025-02-03 PROCEDURE — 84252 ASSAY OF VITAMIN B-2: CPT

## 2025-02-03 PROCEDURE — 83550 IRON BINDING TEST: CPT

## 2025-02-03 PROCEDURE — 36415 COLL VENOUS BLD VENIPUNCTURE: CPT

## 2025-02-03 PROCEDURE — 84443 ASSAY THYROID STIM HORMONE: CPT

## 2025-02-03 PROCEDURE — 84425 ASSAY OF VITAMIN B-1: CPT

## 2025-02-03 PROCEDURE — 83540 ASSAY OF IRON: CPT

## 2025-02-03 PROCEDURE — 84207 ASSAY OF VITAMIN B-6: CPT

## 2025-02-03 PROCEDURE — 80053 COMPREHEN METABOLIC PANEL: CPT

## 2025-02-03 PROCEDURE — 82306 VITAMIN D 25 HYDROXY: CPT

## 2025-02-03 PROCEDURE — 85025 COMPLETE CBC W/AUTO DIFF WBC: CPT

## 2025-02-03 PROCEDURE — 80061 LIPID PANEL: CPT

## 2025-02-03 PROCEDURE — 82746 ASSAY OF FOLIC ACID SERUM: CPT

## 2025-02-03 PROCEDURE — 82607 VITAMIN B-12: CPT

## 2025-02-06 LAB
VIT B1 BLD-SCNC: 117.8 NMOL/L (ref 66.5–200)
VIT B2 BLD-MCNC: 249 UG/L (ref 137–370)

## 2025-02-07 LAB — VIT B6 SERPL-MCNC: 3.9 UG/L (ref 3.4–65.2)

## 2025-05-11 ENCOUNTER — HOSPITAL ENCOUNTER (INPATIENT)
Facility: HOSPITAL | Age: 55
LOS: 3 days | End: 2025-05-14
Attending: EMERGENCY MEDICINE | Admitting: INTERNAL MEDICINE
Payer: COMMERCIAL

## 2025-05-11 ENCOUNTER — APPOINTMENT (EMERGENCY)
Dept: RADIOLOGY | Facility: HOSPITAL | Age: 55
End: 2025-05-11
Payer: COMMERCIAL

## 2025-05-11 DIAGNOSIS — T50.902A INTENTIONAL DRUG OVERDOSE, INITIAL ENCOUNTER (HCC): Primary | ICD-10-CM

## 2025-05-11 DIAGNOSIS — F32.A DEPRESSION: ICD-10-CM

## 2025-05-11 DIAGNOSIS — M79.605 PAIN OF LEFT LOWER EXTREMITY: ICD-10-CM

## 2025-05-11 DIAGNOSIS — G89.29 CHRONIC HEADACHES: ICD-10-CM

## 2025-05-11 DIAGNOSIS — G89.29 CHRONIC PAIN: ICD-10-CM

## 2025-05-11 DIAGNOSIS — M48.07 STENOSIS OF LUMBOSACRAL SPINE: ICD-10-CM

## 2025-05-11 DIAGNOSIS — R51.9 CHRONIC HEADACHES: ICD-10-CM

## 2025-05-11 DIAGNOSIS — N39.0 URINARY TRACT INFECTION: ICD-10-CM

## 2025-05-11 DIAGNOSIS — E66.01 MORBIDLY OBESE (HCC): ICD-10-CM

## 2025-05-11 PROBLEM — J96.02 ACUTE RESPIRATORY FAILURE WITH HYPOXIA AND HYPERCAPNIA (HCC): Status: ACTIVE | Noted: 2025-05-11

## 2025-05-11 PROBLEM — G92.9 ENCEPHALOPATHY, TOXIC: Status: ACTIVE | Noted: 2025-05-11

## 2025-05-11 PROBLEM — E27.1 ADDISON'S DISEASE (HCC): Status: ACTIVE | Noted: 2025-05-11

## 2025-05-11 PROBLEM — E27.40 ADRENAL INSUFFICIENCY (HCC): Status: ACTIVE | Noted: 2023-10-05

## 2025-05-11 PROBLEM — E27.1 ADDISON'S DISEASE (HCC): Status: RESOLVED | Noted: 2025-05-11 | Resolved: 2025-05-11

## 2025-05-11 PROBLEM — Z98.84 H/O GASTRIC BYPASS: Status: ACTIVE | Noted: 2025-05-11

## 2025-05-11 PROBLEM — J96.01 ACUTE RESPIRATORY FAILURE WITH HYPOXIA AND HYPERCAPNIA (HCC): Status: ACTIVE | Noted: 2025-05-11

## 2025-05-11 PROBLEM — I26.99 PULMONARY EMBOLISM (HCC): Status: ACTIVE | Noted: 2025-05-11

## 2025-05-11 LAB
ALBUMIN SERPL BCG-MCNC: 3.9 G/DL (ref 3.5–5)
ALP SERPL-CCNC: 116 U/L (ref 34–104)
ALT SERPL W P-5'-P-CCNC: 41 U/L (ref 7–52)
AMORPH URATE CRY URNS QL MICRO: ABNORMAL
AMPHETAMINES SERPL QL SCN: NEGATIVE
ANION GAP SERPL CALCULATED.3IONS-SCNC: 10 MMOL/L (ref 4–13)
APAP SERPL-MCNC: 23 UG/ML (ref 10–20)
AST SERPL W P-5'-P-CCNC: 53 U/L (ref 13–39)
BACTERIA UR QL AUTO: ABNORMAL /HPF
BARBITURATES UR QL: NEGATIVE
BASE EX.OXY STD BLDV CALC-SCNC: 84.1 % (ref 60–80)
BASE EXCESS BLDV CALC-SCNC: -8.6 MMOL/L
BASOPHILS # BLD AUTO: 0.04 THOUSANDS/ÂΜL (ref 0–0.1)
BASOPHILS NFR BLD AUTO: 0 % (ref 0–1)
BENZODIAZ UR QL: NEGATIVE
BILIRUB SERPL-MCNC: 0.38 MG/DL (ref 0.2–1)
BILIRUB UR QL STRIP: NEGATIVE
BUN SERPL-MCNC: 13 MG/DL (ref 5–25)
CALCIUM SERPL-MCNC: 8.1 MG/DL (ref 8.4–10.2)
CAOX CRY URNS QL MICRO: ABNORMAL /HPF
CHLORIDE SERPL-SCNC: 108 MMOL/L (ref 96–108)
CK SERPL-CCNC: 50 U/L (ref 26–192)
CLARITY UR: ABNORMAL
CO2 SERPL-SCNC: 22 MMOL/L (ref 21–32)
COCAINE UR QL: NEGATIVE
COLOR UR: YELLOW
CREAT SERPL-MCNC: 1.15 MG/DL (ref 0.6–1.3)
EOSINOPHIL # BLD AUTO: 0.14 THOUSAND/ÂΜL (ref 0–0.61)
EOSINOPHIL NFR BLD AUTO: 1 % (ref 0–6)
ERYTHROCYTE [DISTWIDTH] IN BLOOD BY AUTOMATED COUNT: 17.7 % (ref 11.6–15.1)
ETHANOL SERPL-MCNC: <10 MG/DL
FENTANYL UR QL SCN: NEGATIVE
GFR SERPL CREATININE-BSD FRML MDRD: 53 ML/MIN/1.73SQ M
GLUCOSE SERPL-MCNC: 286 MG/DL (ref 65–140)
GLUCOSE SERPL-MCNC: 288 MG/DL (ref 65–140)
GLUCOSE UR STRIP-MCNC: ABNORMAL MG/DL
HCO3 BLDV-SCNC: 20.2 MMOL/L (ref 24–30)
HCT VFR BLD AUTO: 36.9 % (ref 34.8–46.1)
HGB BLD-MCNC: 10.4 G/DL (ref 11.5–15.4)
HGB UR QL STRIP.AUTO: NEGATIVE
HYALINE CASTS #/AREA URNS LPF: ABNORMAL /LPF
HYDROCODONE UR QL SCN: POSITIVE
IMM GRANULOCYTES # BLD AUTO: 0.1 THOUSAND/UL (ref 0–0.2)
IMM GRANULOCYTES NFR BLD AUTO: 1 % (ref 0–2)
KETONES UR STRIP-MCNC: NEGATIVE MG/DL
LACTATE SERPL-SCNC: <0.2 MMOL/L (ref 0.5–2)
LEUKOCYTE ESTERASE UR QL STRIP: ABNORMAL
LYMPHOCYTES # BLD AUTO: 2.69 THOUSANDS/ÂΜL (ref 0.6–4.47)
LYMPHOCYTES NFR BLD AUTO: 27 % (ref 14–44)
MCH RBC QN AUTO: 22.4 PG (ref 26.8–34.3)
MCHC RBC AUTO-ENTMCNC: 28.2 G/DL (ref 31.4–37.4)
MCV RBC AUTO: 79 FL (ref 82–98)
METHADONE UR QL: NEGATIVE
MONOCYTES # BLD AUTO: 0.57 THOUSAND/ÂΜL (ref 0.17–1.22)
MONOCYTES NFR BLD AUTO: 6 % (ref 4–12)
NEUTROPHILS # BLD AUTO: 6.53 THOUSANDS/ÂΜL (ref 1.85–7.62)
NEUTS SEG NFR BLD AUTO: 65 % (ref 43–75)
NITRITE UR QL STRIP: POSITIVE
NON-SQ EPI CELLS URNS QL MICRO: ABNORMAL /HPF
NRBC BLD AUTO-RTO: 0 /100 WBCS
O2 CT BLDV-SCNC: 13.5 ML/DL
OPIATES UR QL SCN: NEGATIVE
OXYCODONE+OXYMORPHONE UR QL SCN: NEGATIVE
PCO2 BLDV: 56.8 MM HG (ref 42–50)
PCP UR QL: NEGATIVE
PH BLDV: 7.17 [PH] (ref 7.3–7.4)
PH UR STRIP.AUTO: 5.5 [PH]
PLATELET # BLD AUTO: 380 THOUSANDS/UL (ref 149–390)
PMV BLD AUTO: 9 FL (ref 8.9–12.7)
PO2 BLDV: 63 MM HG (ref 35–45)
POTASSIUM SERPL-SCNC: 4.1 MMOL/L (ref 3.5–5.3)
PROT SERPL-MCNC: 6 G/DL (ref 6.4–8.4)
PROT UR STRIP-MCNC: ABNORMAL MG/DL
RBC # BLD AUTO: 4.65 MILLION/UL (ref 3.81–5.12)
RBC #/AREA URNS AUTO: ABNORMAL /HPF
SALICYLATES SERPL-MCNC: <5 MG/DL (ref 5–20)
SODIUM SERPL-SCNC: 140 MMOL/L (ref 135–147)
SP GR UR STRIP.AUTO: 1.02 (ref 1–1.03)
THC UR QL: NEGATIVE
UROBILINOGEN UR STRIP-ACNC: <2 MG/DL
WBC # BLD AUTO: 10.07 THOUSAND/UL (ref 4.31–10.16)
WBC #/AREA URNS AUTO: ABNORMAL /HPF

## 2025-05-11 PROCEDURE — 99285 EMERGENCY DEPT VISIT HI MDM: CPT

## 2025-05-11 PROCEDURE — 99291 CRITICAL CARE FIRST HOUR: CPT | Performed by: EMERGENCY MEDICINE

## 2025-05-11 PROCEDURE — 93005 ELECTROCARDIOGRAM TRACING: CPT

## 2025-05-11 PROCEDURE — 82077 ASSAY SPEC XCP UR&BREATH IA: CPT | Performed by: STUDENT IN AN ORGANIZED HEALTH CARE EDUCATION/TRAINING PROGRAM

## 2025-05-11 PROCEDURE — 80307 DRUG TEST PRSMV CHEM ANLYZR: CPT | Performed by: STUDENT IN AN ORGANIZED HEALTH CARE EDUCATION/TRAINING PROGRAM

## 2025-05-11 PROCEDURE — 80179 DRUG ASSAY SALICYLATE: CPT | Performed by: STUDENT IN AN ORGANIZED HEALTH CARE EDUCATION/TRAINING PROGRAM

## 2025-05-11 PROCEDURE — 82805 BLOOD GASES W/O2 SATURATION: CPT | Performed by: EMERGENCY MEDICINE

## 2025-05-11 PROCEDURE — 96365 THER/PROPH/DIAG IV INF INIT: CPT

## 2025-05-11 PROCEDURE — 83540 ASSAY OF IRON: CPT | Performed by: STUDENT IN AN ORGANIZED HEALTH CARE EDUCATION/TRAINING PROGRAM

## 2025-05-11 PROCEDURE — 71045 X-RAY EXAM CHEST 1 VIEW: CPT

## 2025-05-11 PROCEDURE — 80053 COMPREHEN METABOLIC PANEL: CPT | Performed by: STUDENT IN AN ORGANIZED HEALTH CARE EDUCATION/TRAINING PROGRAM

## 2025-05-11 PROCEDURE — 82805 BLOOD GASES W/O2 SATURATION: CPT | Performed by: NURSE PRACTITIONER

## 2025-05-11 PROCEDURE — 83605 ASSAY OF LACTIC ACID: CPT | Performed by: EMERGENCY MEDICINE

## 2025-05-11 PROCEDURE — 82550 ASSAY OF CK (CPK): CPT | Performed by: EMERGENCY MEDICINE

## 2025-05-11 PROCEDURE — 83036 HEMOGLOBIN GLYCOSYLATED A1C: CPT | Performed by: NURSE PRACTITIONER

## 2025-05-11 PROCEDURE — 82728 ASSAY OF FERRITIN: CPT | Performed by: STUDENT IN AN ORGANIZED HEALTH CARE EDUCATION/TRAINING PROGRAM

## 2025-05-11 PROCEDURE — 36415 COLL VENOUS BLD VENIPUNCTURE: CPT | Performed by: STUDENT IN AN ORGANIZED HEALTH CARE EDUCATION/TRAINING PROGRAM

## 2025-05-11 PROCEDURE — 81001 URINALYSIS AUTO W/SCOPE: CPT | Performed by: EMERGENCY MEDICINE

## 2025-05-11 PROCEDURE — 82948 REAGENT STRIP/BLOOD GLUCOSE: CPT

## 2025-05-11 PROCEDURE — 85025 COMPLETE CBC W/AUTO DIFF WBC: CPT | Performed by: STUDENT IN AN ORGANIZED HEALTH CARE EDUCATION/TRAINING PROGRAM

## 2025-05-11 PROCEDURE — 80143 DRUG ASSAY ACETAMINOPHEN: CPT | Performed by: STUDENT IN AN ORGANIZED HEALTH CARE EDUCATION/TRAINING PROGRAM

## 2025-05-11 PROCEDURE — 96374 THER/PROPH/DIAG INJ IV PUSH: CPT

## 2025-05-11 PROCEDURE — 96361 HYDRATE IV INFUSION ADD-ON: CPT

## 2025-05-11 PROCEDURE — 83550 IRON BINDING TEST: CPT | Performed by: STUDENT IN AN ORGANIZED HEALTH CARE EDUCATION/TRAINING PROGRAM

## 2025-05-11 RX ORDER — HEPARIN SODIUM 5000 [USP'U]/ML
5000 INJECTION, SOLUTION INTRAVENOUS; SUBCUTANEOUS EVERY 8 HOURS SCHEDULED
Status: DISCONTINUED | OUTPATIENT
Start: 2025-05-11 | End: 2025-05-14 | Stop reason: HOSPADM

## 2025-05-11 RX ORDER — INSULIN LISPRO 100 [IU]/ML
1-6 INJECTION, SOLUTION INTRAVENOUS; SUBCUTANEOUS EVERY 6 HOURS SCHEDULED
Status: DISCONTINUED | OUTPATIENT
Start: 2025-05-12 | End: 2025-05-14 | Stop reason: HOSPADM

## 2025-05-11 RX ORDER — SODIUM CHLORIDE, SODIUM GLUCONATE, SODIUM ACETATE, POTASSIUM CHLORIDE, MAGNESIUM CHLORIDE, SODIUM PHOSPHATE, DIBASIC, AND POTASSIUM PHOSPHATE .53; .5; .37; .037; .03; .012; .00082 G/100ML; G/100ML; G/100ML; G/100ML; G/100ML; G/100ML; G/100ML
75 INJECTION, SOLUTION INTRAVENOUS CONTINUOUS
Status: DISCONTINUED | OUTPATIENT
Start: 2025-05-11 | End: 2025-05-12

## 2025-05-11 RX ORDER — NALOXONE HYDROCHLORIDE 1 MG/ML
2 INJECTION PARENTERAL ONCE
Status: COMPLETED | OUTPATIENT
Start: 2025-05-11 | End: 2025-05-11

## 2025-05-11 RX ORDER — ALBUTEROL SULFATE 90 UG/1
2 INHALANT RESPIRATORY (INHALATION) EVERY 4 HOURS PRN
Status: DISCONTINUED | OUTPATIENT
Start: 2025-05-11 | End: 2025-05-12

## 2025-05-11 RX ORDER — CHLORHEXIDINE GLUCONATE ORAL RINSE 1.2 MG/ML
15 SOLUTION DENTAL EVERY 12 HOURS SCHEDULED
Status: DISCONTINUED | OUTPATIENT
Start: 2025-05-11 | End: 2025-05-12

## 2025-05-11 RX ADMIN — ACETYLCYSTEINE 4460 MG: 200 INJECTION, SOLUTION INTRAVENOUS at 23:42

## 2025-05-11 RX ADMIN — CEFTRIAXONE SODIUM 1000 MG: 10 INJECTION, POWDER, FOR SOLUTION INTRAVENOUS at 23:08

## 2025-05-11 RX ADMIN — SODIUM CHLORIDE 1000 ML: 0.9 INJECTION, SOLUTION INTRAVENOUS at 21:17

## 2025-05-11 RX ADMIN — ACETYLCYSTEINE 13380 MG: 200 INJECTION, SOLUTION INTRAVENOUS at 22:20

## 2025-05-11 NOTE — Clinical Note
Case was discussed with Meli and the patient's admission status was agreed to be Admission Status: inpatient status to the service of Dr. Rahman .

## 2025-05-11 NOTE — LETTER
[facility]  FLOOR MED SURG UNIT  [address]  [place] [id number]  Dept: [phone]    May 13, 2025     Patient: Marva Salgado   YOB: 1970   Date of Visit: 5/11/2025       To Whom it May Concern:    Abundio Salgado was accompanying his mother, Marva Salgado while she was admitted in the hospital. He was accompanying her in the hospital on 5/12/2025.     If you have any questions or concerns, please don't hesitate to call.         Sincerely,          Eulalia Nayak, DO

## 2025-05-12 PROBLEM — A41.9 SEVERE SEPSIS (HCC): Status: ACTIVE | Noted: 2025-05-12

## 2025-05-12 PROBLEM — R65.20 SEVERE SEPSIS (HCC): Status: ACTIVE | Noted: 2025-05-12

## 2025-05-12 PROBLEM — G89.29 CHRONIC PAIN: Status: ACTIVE | Noted: 2025-05-12

## 2025-05-12 PROBLEM — R65.10 SIRS (SYSTEMIC INFLAMMATORY RESPONSE SYNDROME) (HCC): Status: ACTIVE | Noted: 2025-05-12

## 2025-05-12 PROBLEM — E87.20 METABOLIC ACIDOSIS: Status: ACTIVE | Noted: 2025-05-12

## 2025-05-12 PROBLEM — F32.A DEPRESSION: Status: ACTIVE | Noted: 2025-05-12

## 2025-05-12 LAB
2HR DELTA HS TROPONIN: 6 NG/L
4HR DELTA HS TROPONIN: 1 NG/L
ALBUMIN SERPL BCG-MCNC: 2.8 G/DL (ref 3.5–5)
ALBUMIN SERPL BCG-MCNC: 3.4 G/DL (ref 3.5–5)
ALBUMIN SERPL BCG-MCNC: 3.5 G/DL (ref 3.5–5)
ALP SERPL-CCNC: 66 U/L (ref 34–104)
ALP SERPL-CCNC: 79 U/L (ref 34–104)
ALP SERPL-CCNC: 85 U/L (ref 34–104)
ALT SERPL W P-5'-P-CCNC: 27 U/L (ref 7–52)
ALT SERPL W P-5'-P-CCNC: 32 U/L (ref 7–52)
ALT SERPL W P-5'-P-CCNC: 36 U/L (ref 7–52)
ANION GAP SERPL CALCULATED.3IONS-SCNC: 11 MMOL/L (ref 4–13)
ANION GAP SERPL CALCULATED.3IONS-SCNC: 12 MMOL/L (ref 4–13)
ANION GAP SERPL CALCULATED.3IONS-SCNC: 9 MMOL/L (ref 4–13)
APAP SERPL-MCNC: 3 UG/ML (ref 10–20)
APAP SERPL-MCNC: 7 UG/ML (ref 10–20)
AST SERPL W P-5'-P-CCNC: 27 U/L (ref 13–39)
AST SERPL W P-5'-P-CCNC: 34 U/L (ref 13–39)
AST SERPL W P-5'-P-CCNC: 38 U/L (ref 13–39)
BASE EX.OXY STD BLDV CALC-SCNC: 63.8 % (ref 60–80)
BASE EX.OXY STD BLDV CALC-SCNC: 70.3 % (ref 60–80)
BASE EX.OXY STD BLDV CALC-SCNC: 74.5 % (ref 60–80)
BASE EXCESS BLDV CALC-SCNC: -12.1 MMOL/L
BASE EXCESS BLDV CALC-SCNC: -5.9 MMOL/L
BASE EXCESS BLDV CALC-SCNC: -8.1 MMOL/L
BASOPHILS # BLD AUTO: 0.02 THOUSANDS/ÂΜL (ref 0–0.1)
BASOPHILS NFR BLD AUTO: 0 % (ref 0–1)
BILIRUB DIRECT SERPL-MCNC: 0 MG/DL (ref 0–0.2)
BILIRUB SERPL-MCNC: 0.23 MG/DL (ref 0.2–1)
BILIRUB SERPL-MCNC: 0.25 MG/DL (ref 0.2–1)
BILIRUB SERPL-MCNC: 0.27 MG/DL (ref 0.2–1)
BUN SERPL-MCNC: 10 MG/DL (ref 5–25)
BUN SERPL-MCNC: 11 MG/DL (ref 5–25)
BUN SERPL-MCNC: 12 MG/DL (ref 5–25)
CA-I BLD-SCNC: 0.98 MMOL/L (ref 1.12–1.32)
CALCIUM ALBUM COR SERPL-MCNC: 7.4 MG/DL (ref 8.3–10.1)
CALCIUM ALBUM COR SERPL-MCNC: 7.6 MG/DL (ref 8.3–10.1)
CALCIUM SERPL-MCNC: 6.4 MG/DL (ref 8.4–10.2)
CALCIUM SERPL-MCNC: 7.1 MG/DL (ref 8.4–10.2)
CALCIUM SERPL-MCNC: 7.3 MG/DL (ref 8.4–10.2)
CARDIAC TROPONIN I PNL SERPL HS: 16 NG/L (ref ?–50)
CARDIAC TROPONIN I PNL SERPL HS: 17 NG/L (ref ?–50)
CARDIAC TROPONIN I PNL SERPL HS: 22 NG/L (ref ?–50)
CHLORIDE SERPL-SCNC: 100 MMOL/L (ref 96–108)
CHLORIDE SERPL-SCNC: 106 MMOL/L (ref 96–108)
CHLORIDE SERPL-SCNC: 108 MMOL/L (ref 96–108)
CO2 SERPL-SCNC: 21 MMOL/L (ref 21–32)
CO2 SERPL-SCNC: 23 MMOL/L (ref 21–32)
CO2 SERPL-SCNC: 23 MMOL/L (ref 21–32)
CREAT SERPL-MCNC: 0.77 MG/DL (ref 0.6–1.3)
CREAT SERPL-MCNC: 0.83 MG/DL (ref 0.6–1.3)
CREAT SERPL-MCNC: 0.88 MG/DL (ref 0.6–1.3)
EOSINOPHIL # BLD AUTO: 0.05 THOUSAND/ÂΜL (ref 0–0.61)
EOSINOPHIL NFR BLD AUTO: 1 % (ref 0–6)
ERYTHROCYTE [DISTWIDTH] IN BLOOD BY AUTOMATED COUNT: 17.4 % (ref 11.6–15.1)
EST. AVERAGE GLUCOSE BLD GHB EST-MCNC: 146 MG/DL
FERRITIN SERPL-MCNC: 7 NG/ML (ref 30–307)
GFR SERPL CREATININE-BSD FRML MDRD: 74 ML/MIN/1.73SQ M
GFR SERPL CREATININE-BSD FRML MDRD: 79 ML/MIN/1.73SQ M
GFR SERPL CREATININE-BSD FRML MDRD: 87 ML/MIN/1.73SQ M
GLUCOSE SERPL-MCNC: 111 MG/DL (ref 65–140)
GLUCOSE SERPL-MCNC: 113 MG/DL (ref 65–140)
GLUCOSE SERPL-MCNC: 134 MG/DL (ref 65–140)
GLUCOSE SERPL-MCNC: 150 MG/DL (ref 65–140)
GLUCOSE SERPL-MCNC: 177 MG/DL (ref 65–140)
GLUCOSE SERPL-MCNC: 186 MG/DL (ref 65–140)
GLUCOSE SERPL-MCNC: 202 MG/DL (ref 65–140)
GLUCOSE SERPL-MCNC: 433 MG/DL (ref 65–140)
GLUCOSE SERPL-MCNC: 96 MG/DL (ref 65–140)
HBA1C MFR BLD: 6.7 %
HCO3 BLDV-SCNC: 17.1 MMOL/L (ref 24–30)
HCO3 BLDV-SCNC: 18.1 MMOL/L (ref 24–30)
HCO3 BLDV-SCNC: 21.4 MMOL/L (ref 24–30)
HCT VFR BLD AUTO: 28.3 % (ref 34.8–46.1)
HGB BLD-MCNC: 8.1 G/DL (ref 11.5–15.4)
IMM GRANULOCYTES # BLD AUTO: 0.03 THOUSAND/UL (ref 0–0.2)
IMM GRANULOCYTES NFR BLD AUTO: 0 % (ref 0–2)
IRON SATN MFR SERPL: 5 % (ref 15–50)
IRON SERPL-MCNC: 22 UG/DL (ref 50–212)
L PNEUMO1 AG UR QL IA.RAPID: NEGATIVE
LACTATE SERPL-SCNC: 0.9 MMOL/L (ref 0.5–2)
LYMPHOCYTES # BLD AUTO: 2.25 THOUSANDS/ÂΜL (ref 0.6–4.47)
LYMPHOCYTES NFR BLD AUTO: 25 % (ref 14–44)
MAGNESIUM SERPL-MCNC: 1.6 MG/DL (ref 1.9–2.7)
MAGNESIUM SERPL-MCNC: 1.8 MG/DL (ref 1.9–2.7)
MCH RBC QN AUTO: 22.5 PG (ref 26.8–34.3)
MCHC RBC AUTO-ENTMCNC: 28.6 G/DL (ref 31.4–37.4)
MCV RBC AUTO: 79 FL (ref 82–98)
MONOCYTES # BLD AUTO: 0.77 THOUSAND/ÂΜL (ref 0.17–1.22)
MONOCYTES NFR BLD AUTO: 9 % (ref 4–12)
NEUTROPHILS # BLD AUTO: 5.88 THOUSANDS/ÂΜL (ref 1.85–7.62)
NEUTS SEG NFR BLD AUTO: 65 % (ref 43–75)
NRBC BLD AUTO-RTO: 0 /100 WBCS
O2 CT BLDV-SCNC: 8.8 ML/DL
O2 CT BLDV-SCNC: 8.8 ML/DL
O2 CT BLDV-SCNC: 9.4 ML/DL
PCO2 BLDV: 40 MM HG (ref 42–50)
PCO2 BLDV: 50.9 MM HG (ref 42–50)
PCO2 BLDV: 55.7 MM HG (ref 42–50)
PH BLDV: 7.11 [PH] (ref 7.3–7.4)
PH BLDV: 7.24 [PH] (ref 7.3–7.4)
PH BLDV: 7.27 [PH] (ref 7.3–7.4)
PHOSPHATE SERPL-MCNC: 2.9 MG/DL (ref 2.7–4.5)
PLATELET # BLD AUTO: 290 THOUSANDS/UL (ref 149–390)
PMV BLD AUTO: 9.2 FL (ref 8.9–12.7)
PO2 BLDV: 40.9 MM HG (ref 35–45)
PO2 BLDV: 42.3 MM HG (ref 35–45)
PO2 BLDV: 42.6 MM HG (ref 35–45)
POTASSIUM SERPL-SCNC: 3.6 MMOL/L (ref 3.5–5.3)
POTASSIUM SERPL-SCNC: 4.1 MMOL/L (ref 3.5–5.3)
POTASSIUM SERPL-SCNC: 4.3 MMOL/L (ref 3.5–5.3)
PROCALCITONIN SERPL-MCNC: 0.13 NG/ML
PROT SERPL-MCNC: 4.8 G/DL (ref 6.4–8.4)
PROT SERPL-MCNC: 5.4 G/DL (ref 6.4–8.4)
PROT SERPL-MCNC: 5.6 G/DL (ref 6.4–8.4)
RBC # BLD AUTO: 3.6 MILLION/UL (ref 3.81–5.12)
S PNEUM AG UR QL: NEGATIVE
SODIUM SERPL-SCNC: 133 MMOL/L (ref 135–147)
SODIUM SERPL-SCNC: 140 MMOL/L (ref 135–147)
SODIUM SERPL-SCNC: 140 MMOL/L (ref 135–147)
TIBC SERPL-MCNC: 471.8 UG/DL (ref 250–450)
TRANSFERRIN SERPL-MCNC: 337 MG/DL (ref 203–362)
UIBC SERPL-MCNC: 450 UG/DL (ref 155–355)
WBC # BLD AUTO: 9 THOUSAND/UL (ref 4.31–10.16)

## 2025-05-12 PROCEDURE — 84484 ASSAY OF TROPONIN QUANT: CPT | Performed by: NURSE PRACTITIONER

## 2025-05-12 PROCEDURE — 85025 COMPLETE CBC W/AUTO DIFF WBC: CPT | Performed by: NURSE PRACTITIONER

## 2025-05-12 PROCEDURE — 94760 N-INVAS EAR/PLS OXIMETRY 1: CPT

## 2025-05-12 PROCEDURE — 83735 ASSAY OF MAGNESIUM: CPT | Performed by: NURSE PRACTITIONER

## 2025-05-12 PROCEDURE — 82948 REAGENT STRIP/BLOOD GLUCOSE: CPT

## 2025-05-12 PROCEDURE — 82330 ASSAY OF CALCIUM: CPT | Performed by: NURSE PRACTITIONER

## 2025-05-12 PROCEDURE — 80048 BASIC METABOLIC PNL TOTAL CA: CPT | Performed by: NURSE PRACTITIONER

## 2025-05-12 PROCEDURE — 87449 NOS EACH ORGANISM AG IA: CPT | Performed by: STUDENT IN AN ORGANIZED HEALTH CARE EDUCATION/TRAINING PROGRAM

## 2025-05-12 PROCEDURE — 94640 AIRWAY INHALATION TREATMENT: CPT

## 2025-05-12 PROCEDURE — 99223 1ST HOSP IP/OBS HIGH 75: CPT | Performed by: INTERNAL MEDICINE

## 2025-05-12 PROCEDURE — 82805 BLOOD GASES W/O2 SATURATION: CPT | Performed by: NURSE PRACTITIONER

## 2025-05-12 PROCEDURE — 84100 ASSAY OF PHOSPHORUS: CPT | Performed by: NURSE PRACTITIONER

## 2025-05-12 PROCEDURE — 87040 BLOOD CULTURE FOR BACTERIA: CPT | Performed by: NURSE PRACTITIONER

## 2025-05-12 PROCEDURE — 80053 COMPREHEN METABOLIC PANEL: CPT | Performed by: NURSE PRACTITIONER

## 2025-05-12 PROCEDURE — 93005 ELECTROCARDIOGRAM TRACING: CPT

## 2025-05-12 PROCEDURE — 94002 VENT MGMT INPAT INIT DAY: CPT

## 2025-05-12 PROCEDURE — 99255 IP/OBS CONSLTJ NEW/EST HI 80: CPT | Performed by: EMERGENCY MEDICINE

## 2025-05-12 PROCEDURE — 84145 PROCALCITONIN (PCT): CPT | Performed by: NURSE PRACTITIONER

## 2025-05-12 PROCEDURE — 83605 ASSAY OF LACTIC ACID: CPT | Performed by: NURSE PRACTITIONER

## 2025-05-12 PROCEDURE — 80143 DRUG ASSAY ACETAMINOPHEN: CPT | Performed by: NURSE PRACTITIONER

## 2025-05-12 PROCEDURE — 80076 HEPATIC FUNCTION PANEL: CPT | Performed by: NURSE PRACTITIONER

## 2025-05-12 RX ORDER — FERROUS SULFATE 325(65) MG
325 TABLET ORAL ONCE
Status: COMPLETED | OUTPATIENT
Start: 2025-05-13 | End: 2025-05-13

## 2025-05-12 RX ORDER — SODIUM CHLORIDE, SODIUM GLUCONATE, SODIUM ACETATE, POTASSIUM CHLORIDE, MAGNESIUM CHLORIDE, SODIUM PHOSPHATE, DIBASIC, AND POTASSIUM PHOSPHATE .53; .5; .37; .037; .03; .012; .00082 G/100ML; G/100ML; G/100ML; G/100ML; G/100ML; G/100ML; G/100ML
1000 INJECTION, SOLUTION INTRAVENOUS ONCE
Status: COMPLETED | OUTPATIENT
Start: 2025-05-12 | End: 2025-05-12

## 2025-05-12 RX ORDER — FERROUS SULFATE 325(65) MG
325 TABLET ORAL 2 TIMES DAILY WITH MEALS
Status: DISCONTINUED | OUTPATIENT
Start: 2025-05-12 | End: 2025-05-14 | Stop reason: HOSPADM

## 2025-05-12 RX ORDER — LEVALBUTEROL INHALATION SOLUTION 1.25 MG/3ML
1.25 SOLUTION RESPIRATORY (INHALATION)
Status: DISCONTINUED | OUTPATIENT
Start: 2025-05-12 | End: 2025-05-12

## 2025-05-12 RX ORDER — GABAPENTIN 400 MG/1
1200 CAPSULE ORAL
Status: DISCONTINUED | OUTPATIENT
Start: 2025-05-13 | End: 2025-05-12

## 2025-05-12 RX ORDER — DULOXETIN HYDROCHLORIDE 60 MG/1
60 CAPSULE, DELAYED RELEASE ORAL DAILY
Status: DISCONTINUED | OUTPATIENT
Start: 2025-05-12 | End: 2025-05-12

## 2025-05-12 RX ORDER — LEVALBUTEROL INHALATION SOLUTION 1.25 MG/3ML
1.25 SOLUTION RESPIRATORY (INHALATION) EVERY 8 HOURS PRN
Status: DISCONTINUED | OUTPATIENT
Start: 2025-05-12 | End: 2025-05-14 | Stop reason: HOSPADM

## 2025-05-12 RX ORDER — CYANOCOBALAMIN 1000 UG/ML
1000 INJECTION, SOLUTION INTRAMUSCULAR; SUBCUTANEOUS
COMMUNITY
Start: 2025-04-27 | End: 2025-05-19

## 2025-05-12 RX ORDER — PANTOPRAZOLE SODIUM 40 MG/1
40 TABLET, DELAYED RELEASE ORAL
Status: DISCONTINUED | OUTPATIENT
Start: 2025-05-12 | End: 2025-05-12

## 2025-05-12 RX ORDER — METHOCARBAMOL 500 MG/1
500 TABLET, FILM COATED ORAL 3 TIMES DAILY
COMMUNITY
Start: 2025-05-03

## 2025-05-12 RX ORDER — MAGNESIUM SULFATE 1 G/100ML
1 INJECTION INTRAVENOUS ONCE
Status: DISCONTINUED | OUTPATIENT
Start: 2025-05-12 | End: 2025-05-12

## 2025-05-12 RX ORDER — GABAPENTIN 300 MG/1
300 CAPSULE ORAL 3 TIMES DAILY
Status: DISCONTINUED | OUTPATIENT
Start: 2025-05-12 | End: 2025-05-12

## 2025-05-12 RX ORDER — METHOCARBAMOL 500 MG/1
500 TABLET, FILM COATED ORAL EVERY 6 HOURS PRN
Status: DISCONTINUED | OUTPATIENT
Start: 2025-05-12 | End: 2025-05-14 | Stop reason: HOSPADM

## 2025-05-12 RX ORDER — CALCIUM GLUCONATE 20 MG/ML
2 INJECTION, SOLUTION INTRAVENOUS ONCE
Status: COMPLETED | OUTPATIENT
Start: 2025-05-12 | End: 2025-05-12

## 2025-05-12 RX ORDER — POLYETHYLENE GLYCOL 3350 17 G/17G
17 POWDER, FOR SOLUTION ORAL DAILY PRN
Status: DISCONTINUED | OUTPATIENT
Start: 2025-05-12 | End: 2025-05-14 | Stop reason: HOSPADM

## 2025-05-12 RX ORDER — ELETRIPTAN HYDROBROMIDE 40 MG/1
40 TABLET, FILM COATED ORAL ONCE AS NEEDED
COMMUNITY
Start: 2025-03-19 | End: 2025-05-19

## 2025-05-12 RX ORDER — DEXAMETHASONE SODIUM PHOSPHATE 10 MG/ML
10 INJECTION, SOLUTION INTRAMUSCULAR; INTRAVENOUS ONCE
Status: COMPLETED | OUTPATIENT
Start: 2025-05-12 | End: 2025-05-12

## 2025-05-12 RX ORDER — METOCLOPRAMIDE HYDROCHLORIDE 5 MG/ML
10 INJECTION INTRAMUSCULAR; INTRAVENOUS EVERY 8 HOURS SCHEDULED
Status: DISCONTINUED | OUTPATIENT
Start: 2025-05-12 | End: 2025-05-13

## 2025-05-12 RX ORDER — PANTOPRAZOLE SODIUM 20 MG/1
20 TABLET, DELAYED RELEASE ORAL
Status: DISCONTINUED | OUTPATIENT
Start: 2025-05-12 | End: 2025-05-14 | Stop reason: HOSPADM

## 2025-05-12 RX ORDER — ATORVASTATIN CALCIUM 20 MG/1
20 TABLET, FILM COATED ORAL DAILY
COMMUNITY
Start: 2025-01-10

## 2025-05-12 RX ORDER — GABAPENTIN 300 MG/1
300 CAPSULE ORAL 3 TIMES DAILY
Status: DISCONTINUED | OUTPATIENT
Start: 2025-05-13 | End: 2025-05-14 | Stop reason: HOSPADM

## 2025-05-12 RX ORDER — DIPHENHYDRAMINE HYDROCHLORIDE 50 MG/ML
25 INJECTION, SOLUTION INTRAMUSCULAR; INTRAVENOUS EVERY 8 HOURS SCHEDULED
Status: DISCONTINUED | OUTPATIENT
Start: 2025-05-12 | End: 2025-05-13

## 2025-05-12 RX ORDER — MAGNESIUM SULFATE HEPTAHYDRATE 40 MG/ML
2 INJECTION, SOLUTION INTRAVENOUS EVERY 24 HOURS
Status: DISCONTINUED | OUTPATIENT
Start: 2025-05-12 | End: 2025-05-13

## 2025-05-12 RX ORDER — FERROUS GLUCONATE 324(38)MG
324 TABLET ORAL
COMMUNITY
Start: 2025-04-25

## 2025-05-12 RX ORDER — METHOCARBAMOL 500 MG/1
500 TABLET, FILM COATED ORAL EVERY 6 HOURS PRN
Status: DISCONTINUED | OUTPATIENT
Start: 2025-05-12 | End: 2025-05-12

## 2025-05-12 RX ORDER — ATORVASTATIN CALCIUM 20 MG/1
20 TABLET, FILM COATED ORAL
Status: DISCONTINUED | OUTPATIENT
Start: 2025-05-12 | End: 2025-05-14 | Stop reason: HOSPADM

## 2025-05-12 RX ORDER — KETOROLAC TROMETHAMINE 30 MG/ML
15 INJECTION, SOLUTION INTRAMUSCULAR; INTRAVENOUS ONCE
Status: COMPLETED | OUTPATIENT
Start: 2025-05-12 | End: 2025-05-12

## 2025-05-12 RX ORDER — MAGNESIUM SULFATE HEPTAHYDRATE 40 MG/ML
2 INJECTION, SOLUTION INTRAVENOUS ONCE
Status: COMPLETED | OUTPATIENT
Start: 2025-05-12 | End: 2025-05-12

## 2025-05-12 RX ORDER — METHOCARBAMOL 500 MG/1
1000 TABLET, FILM COATED ORAL
Status: DISCONTINUED | OUTPATIENT
Start: 2025-05-12 | End: 2025-05-12

## 2025-05-12 RX ORDER — VANCOMYCIN HYDROCHLORIDE 1 G/200ML
1000 INJECTION, SOLUTION INTRAVENOUS EVERY 12 HOURS
Status: DISCONTINUED | OUTPATIENT
Start: 2025-05-12 | End: 2025-05-12

## 2025-05-12 RX ADMIN — DIPHENHYDRAMINE HYDROCHLORIDE 25 MG: 50 INJECTION, SOLUTION INTRAMUSCULAR; INTRAVENOUS at 13:51

## 2025-05-12 RX ADMIN — METOCLOPRAMIDE 10 MG: 5 INJECTION, SOLUTION INTRAMUSCULAR; INTRAVENOUS at 21:36

## 2025-05-12 RX ADMIN — KETOROLAC TROMETHAMINE 15 MG: 30 INJECTION, SOLUTION INTRAMUSCULAR; INTRAVENOUS at 08:14

## 2025-05-12 RX ADMIN — MAGNESIUM SULFATE HEPTAHYDRATE 2 G: 40 INJECTION, SOLUTION INTRAVENOUS at 06:21

## 2025-05-12 RX ADMIN — METHOCARBAMOL TABLETS 500 MG: 500 TABLET, COATED ORAL at 21:54

## 2025-05-12 RX ADMIN — ACETYLCYSTEINE 8920 MG: 200 INJECTION INTRAVENOUS at 04:43

## 2025-05-12 RX ADMIN — FERROUS SULFATE TAB 325 MG (65 MG ELEMENTAL FE) 325 MG: 325 (65 FE) TAB at 08:14

## 2025-05-12 RX ADMIN — HEPARIN SODIUM 5000 UNITS: 5000 INJECTION INTRAVENOUS; SUBCUTANEOUS at 13:52

## 2025-05-12 RX ADMIN — CEFTRIAXONE SODIUM 1000 MG: 10 INJECTION, POWDER, FOR SOLUTION INTRAVENOUS at 23:14

## 2025-05-12 RX ADMIN — CALCIUM GLUCONATE 2 G: 20 INJECTION, SOLUTION INTRAVENOUS at 08:43

## 2025-05-12 RX ADMIN — CEFEPIME 2000 MG: 2 INJECTION, POWDER, FOR SOLUTION INTRAVENOUS at 10:20

## 2025-05-12 RX ADMIN — HEPARIN SODIUM 5000 UNITS: 5000 INJECTION INTRAVENOUS; SUBCUTANEOUS at 21:37

## 2025-05-12 RX ADMIN — INSULIN LISPRO 1 UNITS: 100 INJECTION, SOLUTION INTRAVENOUS; SUBCUTANEOUS at 23:50

## 2025-05-12 RX ADMIN — DULOXETINE 90 MG: 60 CAPSULE, DELAYED RELEASE ORAL at 21:54

## 2025-05-12 RX ADMIN — GABAPENTIN 300 MG: 300 CAPSULE ORAL at 21:36

## 2025-05-12 RX ADMIN — CHLORHEXIDINE GLUCONATE 15 ML: 1.2 SOLUTION ORAL at 08:14

## 2025-05-12 RX ADMIN — PANTOPRAZOLE SODIUM 20 MG: 20 TABLET, DELAYED RELEASE ORAL at 21:55

## 2025-05-12 RX ADMIN — MAGNESIUM SULFATE HEPTAHYDRATE 2 G: 40 INJECTION, SOLUTION INTRAVENOUS at 12:12

## 2025-05-12 RX ADMIN — SODIUM CHLORIDE, SODIUM GLUCONATE, SODIUM ACETATE, POTASSIUM CHLORIDE, MAGNESIUM CHLORIDE, SODIUM PHOSPHATE, DIBASIC, AND POTASSIUM PHOSPHATE 1000 ML: .53; .5; .37; .037; .03; .012; .00082 INJECTION, SOLUTION INTRAVENOUS at 00:18

## 2025-05-12 RX ADMIN — VANCOMYCIN HYDROCHLORIDE 1750 MG: 5 INJECTION, POWDER, LYOPHILIZED, FOR SOLUTION INTRAVENOUS at 01:52

## 2025-05-12 RX ADMIN — ATORVASTATIN CALCIUM 20 MG: 20 TABLET, FILM COATED ORAL at 21:35

## 2025-05-12 RX ADMIN — SODIUM CHLORIDE, SODIUM GLUCONATE, SODIUM ACETATE, POTASSIUM CHLORIDE, MAGNESIUM CHLORIDE, SODIUM PHOSPHATE, DIBASIC, AND POTASSIUM PHOSPHATE 75 ML/HR: .53; .5; .37; .037; .03; .012; .00082 INJECTION, SOLUTION INTRAVENOUS at 00:58

## 2025-05-12 RX ADMIN — DIPHENHYDRAMINE HYDROCHLORIDE 25 MG: 50 INJECTION, SOLUTION INTRAMUSCULAR; INTRAVENOUS at 21:36

## 2025-05-12 RX ADMIN — HEPARIN SODIUM 5000 UNITS: 5000 INJECTION INTRAVENOUS; SUBCUTANEOUS at 00:59

## 2025-05-12 RX ADMIN — INSULIN LISPRO 1 UNITS: 100 INJECTION, SOLUTION INTRAVENOUS; SUBCUTANEOUS at 17:27

## 2025-05-12 RX ADMIN — GABAPENTIN 300 MG: 300 CAPSULE ORAL at 17:26

## 2025-05-12 RX ADMIN — RIMEGEPANT SULFATE 75 MG: 75 TABLET, ORALLY DISINTEGRATING ORAL at 12:26

## 2025-05-12 RX ADMIN — METOCLOPRAMIDE 10 MG: 5 INJECTION, SOLUTION INTRAMUSCULAR; INTRAVENOUS at 13:52

## 2025-05-12 RX ADMIN — HEPARIN SODIUM 5000 UNITS: 5000 INJECTION INTRAVENOUS; SUBCUTANEOUS at 06:21

## 2025-05-12 RX ADMIN — LEVALBUTEROL HYDROCHLORIDE 1.25 MG: 1.25 SOLUTION RESPIRATORY (INHALATION) at 07:21

## 2025-05-12 RX ADMIN — DEXAMETHASONE SODIUM PHOSPHATE 10 MG: 10 INJECTION INTRAMUSCULAR; INTRAVENOUS at 12:12

## 2025-05-12 NOTE — QUICK NOTE
1430: Updated ex  and friend at the bedside (with patient's consent) regarding patient's hospital course including chest x-ray, UA, current antibiotics, and psych consultation.

## 2025-05-12 NOTE — ASSESSMENT & PLAN NOTE
2/2 somnolence from overdose.  In ER was placed on 2L NC  9 pm VBG 7.168, pCO2 57, pO2 63, HCO3 20, -9  Repeat VBG 7.106, pCO2 56, pO2 42, HCO3 17, -12  CXR: possible infiltrate vs atelectasis in LLL    Plan:  Start BiPAP  Low threshold to intubate given mental status in the setting of overdose  Pulmonary toilet  Titrate FiO2 for SpO2 > 92%  Follow up VBG on BiPAP  See sepsis

## 2025-05-12 NOTE — ASSESSMENT & PLAN NOTE
Lab Results   Component Value Date    HGBA1C 6.7 (H) 05/11/2025       Recent Labs     05/11/25  2100 05/12/25  0011 05/12/25  0055 05/12/25  0553   POCGLU 288* 150* 134 111       Blood Sugar Average: Last 72 hrs:  (P) 170.75  PTA: family reports patient is taking ozempic  Currently hyperglycemic    Plan:  Holding PTA medications  Start accuchecks q6h with SSI  Goal blood glucose 140-180  Avoid hypoglycemia  NPO for now 2/2 mental status  Will need CHO controlled diet when able to take po

## 2025-05-12 NOTE — ASSESSMENT & PLAN NOTE
PTA: dexamethasone 0.5 mg daily    Plan:  Hold for now while NPO  If mental status improves will restart.  If unable to take po will need to convert to IV

## 2025-05-12 NOTE — PROGRESS NOTES
Marva Salgado is a 54 y.o. female who is currently ordered Vancomycin IV with management by the Pharmacy Consult service.  Relevant clinical data and objective / subjective history reviewed.  Vancomycin Assessment:  Indication and Goal AUC/Trough: Pneumonia (goal -600, trough >10)  Clinical Status:  new  Micro:   No results.  Renal Function:  SCr: 0.88 mg/dL  CrCl: 61.9 mL/min  Renal replacement: Not on dialysis  Days of Therapy: 1  Current Dose: 1750mg loading dose  Vancomycin Plan:  New Dosinmg q24h  Estimated AUC: 492 mcg*hr/mL  Estimated Trough: 12.5 mcg/mL  Next Level:  @ 0600  Renal Function Monitoring: Daily BMP and UOP  Pharmacy will continue to follow closely for s/sx of nephrotoxicity, infusion reactions and appropriateness of therapy.  BMP and CBC will be ordered per protocol. We will continue to follow the patient’s culture results and clinical progress daily.    Leora Preston, Pharmacist

## 2025-05-12 NOTE — ASSESSMENT & PLAN NOTE
Lab Results   Component Value Date    HGBA1C 6.1 (H) 02/03/2025       Recent Labs     05/11/25  2100 05/12/25  0011 05/12/25  0055   POCGLU 288* 150* 134       Blood Sugar Average: Last 72 hrs:  (P) [id number]  PTA: family reports patient is taking ozempic  Currently hyperglycemic    Plan:  Holding PTA medications  Start accuchecks q6h with SSI  Goal blood glucose 140-180  Avoid hypoglycemia  NPO for now 2/2 mental status  Will need CHO controlled diet when able to take po

## 2025-05-12 NOTE — ASSESSMENT & PLAN NOTE
On initial CMP CO2 22, VBG HCO3 20, now 17  Lactic < 0.2  Likely in the setting of acute overdose    Plan:  Give 1L bolus IVF  Repeat CMP on arrival to ICU  Trend VBG, BMP

## 2025-05-12 NOTE — ASSESSMENT & PLAN NOTE
Presented somnolent.  Multifocal in the setting of overdose   UDS + hydrocodone  Tylenol level 23  VBG with mixed respiratory and metabolic acidosis  Patient has multiple other medications that can possibly cause lethargy.  Family was unsure what was possibly injested.  On exam she is rousable and answers orientation questions     Plan:  Q1h neuro checks  Low threshold to intubate if unable to protect airway  If mental status worsens will check CT head

## 2025-05-12 NOTE — ED ATTENDING ATTESTATION
"5/11/2025  I, Smooth Dalton MD, saw and evaluated the patient. I have discussed the patient with the resident/non-physician practitioner and agree with the resident's/non-physician practitioner's findings, Plan of Care, and MDM as documented in the resident's/non-physician practitioner's note, except where noted. All available labs and Radiology studies were reviewed.  I was present for key portions of any procedure(s) performed by the resident/non-physician practitioner and I was immediately available to provide assistance.       At this point I agree with the current assessment done in the Emergency Department.  I have conducted an independent evaluation of this patient a history and physical is as follows:    54-year-old female brought in by EMS after being found unconscious at home with an apparent suicide note suspected to have overdosed on her own medications.  Reported that she had CPR in the field but unclear if she was ever pulseless/apneic.  EMS gave intranasal and IV Narcan with some improvement.  Upon arrival to the emergency department patient is very drowsy, eyes closed but does open eyes to voice and follow commands.  No focal deficits.  She answers yes/no questions by shaking her head or nodding but minimal verbalization.  She arrived on nonrebreather mask and had a nasal trumpet in place.  Switched to 2 L nasal cannula maintaining normal saturation.  Nasal trumpet removed.  Heart sounds normal.  Breath sounds are clear.  No external signs of trauma.      ED Course  ED Course as of 05/11/25 2315   Sun May 11, 2025   2134 ACETAMINOPHEN LEVEL(!): 23   2134 AST(!): 53   2148 Patient last seen by  around 630.  She sent a text message to him around 7 PM noting that she fed the dogs, had taken a bunch of medications, and stated \"bye.\"  She does have a history of suicide attempt in the past approximately 10 years ago.   reports that they are  and are going through divorce " proceedings.  Son reports that he is fairly estranged and like the patient feels left out of his wedding planning and may have her symptoms exacerbated because it is Mother's Day.   2148 Given that the patient sent the text message around 7 PM, it is unclear when she actually took all the medications.  Given her acetaminophen level is elevated I am going to treat with N-acetylcysteine.   2153  also noted that when he found her she was in bed snoring, not responsive so started chest compressions.  He reports that when EMS arrived they moved her to the floor and continued compressions.  He believes she still had some respirations during this but stated that one point she felt like she had a weak pulse.  Unclear if there was ever a complete cardiac arrest.   2202 HYDROCODONE URINE(!): Positive   2300 UA suggestive of UTI.  With patient's current state, it is difficult to ascertain if she is having any symptoms.  Will treat empirically with ceftriaxone.     Resident discussed case with poison control center.  Recommending supportive care, recheck LFTs in 2 hours.    Admitted to critical care service on level 1 stepdown for further management.      Critical Care Time  Procedures    .Critical Care Time Statement: Upon my evaluation, this patient had a high probability of imminent or life-threatening deterioration due to medication overdose, acidosis, encephalopathy, which required my direct attention, intervention, and personal management.  I spent a total of 60 minutes directly providing critical care services, including interpretation of complex medical databases, evaluating for the presence of life-threatening injuries or illnesses, management of organ system failure(s) , complex medical decision making (to support/prevent further life-threatening deterioration)., and interpretation of hemodynamic data. This time is exclusive of procedures, teaching, treating other patients, family meetings, and any prior time  recorded by providers other than myself.

## 2025-05-12 NOTE — ASSESSMENT & PLAN NOTE
Ingestion of 8mg dilaudid, 1 dose of Hycet (liquid hydrocodone/Tylenol), 4 Tylenol PM (includes benadryl) and her home meds which includes cymbalta 60mg, gabapentin 1,200mg and robaxcin 500mg  Narcan given pre-hospital with minimal response likely 2/2 poor inhalation  Positive UDS for hydrocodone   Tylenol of 23, repeat of 3  Started on NAC in the ED for elevated AST 53, AST now 34   Overall, currently with benign exam, normal labs, no evidence of toxidrome on exam and normal VS.      Recommendations  Discontinue NAC  Provided there are no additional medical concerns, the patient may be cleared from a toxicological standpoint by the primary provider at the end of the recommended observation period, if the patient is asymptomatic, with normal mental status and vital signs, and otherwise normal exam. Please call medical  on call immediately to discuss any changes in clinical course or laboratory abnormalities.

## 2025-05-12 NOTE — UTILIZATION REVIEW
Initial Clinical Review    Admission: Date/Time/Statement:   Admission Orders (From admission, onward)       Ordered        05/11/25 2315  Inpatient Admission  Once                          Orders Placed This Encounter   Procedures    Inpatient Admission     Standing Status:   Standing     Number of Occurrences:   1     Level of Care:   Level 1 Stepdown [13]     Estimated length of stay:   More than 2 Midnights     Certification:   I certify that inpatient services are medically necessary for this patient for a duration of greater than two midnights. See H&P and MD Progress Notes for additional information about the patient's course of treatment.     ED Arrival Information       Expected   -    Arrival   5/11/2025 20:48    Acuity   Emergent              Means of arrival   Ambulance    Escorted by   [facility]    Service   Critical Care/ICU    Admission type   Emergency              Arrival complaint   ems unresponsive             Chief Complaint   Patient presents with    Overdose - Intentional     Pt tried To OD on her prescription medication. Left a note for the .          Initial Presentation: 54 y.o. female presents to ed from home via ems for  on 5-11-25 for evaluation and treatment of altered mental status from intentional overdose of prescription medication.  Per patient's  who called EMS, he last saw her approximately 6:30 PM and subsequently received a text message approximately 7 PM that she had fed the dog and said goodbye after indicating she had taken a bunch of her medication. Approximately 7:30 PM he found her snoring and unresponsive in bed, placed on the floor and initiated CPR as he was unable to palpate a strong pulse. Unclear if patient experienced true out-of-hospital cardiac arrest. PMHX: gastric bypass, DM2, asthma, migraines, spinal cord stimulator.     Clinical assessment significant for pain 97.4, ht rt 96> 106, pain 7/10, GCS 14.  UA + nitrite, UDS +  hydrocodone, VB.168 / 56.8/ 63/ 20.2, glucose 286, AST 53, ALK PHOS 116, calcium 8.1, salicylate <5, acetaminophen 23, HB 10.4.  Imaging:  Mild perihilar fullness, mild blunting left costophrenic angle Received iv narcan x 1 prior to arrival.  In ed:  .9% ns 1L bolus  x1, iv acetylcysteine x2,iv ceftriaxone x1, iv multi electrolyte 1L bolus x 1. 4L O2nc> 2L O2nc.  Admit to inpatient critical care.  Plan includes: consult toxicology, iv fluids, EKG, neuro checks q1 hr, 1-1 observation, consult psychiatry, monitor airway, iv ceftriaxone for UTI, follow up cultures. BIPAP as needed.           Date:     Day 2: inpatient critical care  V B.273 /40/ 42.6 / 18.1.  I calcium 0.98, glucose 433, calcium 6.4, iron 22, Mag 1.8.  2L O2nc changed to BIPAP overnight.  This morning spo2 96% ra resting.  Gcs improved to 14> 15.  Blood cultures pending.  Urine culture, legionella antigen and strep pneumoniae pending.  Continue neuro checks q4hr,  1-1 observation.  Received iv acetylcysteine at 0443, iv calcium gluconate 0843, iv Mag at 0620, 1L bolus iv multi electrolyte, .  Continue iv ceftriaxone q24 hr.      ED Treatment-Medication Administration from 2025 to 2025 0030         Date/Time Order Dose Route Action     2025 naloxone (FOR EMS ONLY) (NARCAN) 2 MG/2ML injection 4 mg   Given to EMS     2025 sodium chloride 0.9 % bolus 1,000 mL 1,000 mL Intravenous New Bag     2025 2220 acetylcysteine (ACETADOTE) 13,380 mg in dextrose 5 % 200 mL IVPB 13,380 mg Intravenous New Bag     2025 2342 acetylcysteine (ACETADOTE) 4,460 mg in dextrose 5 % 500 mL IVPB 4,460 mg Intravenous New Bag     2025 2308 ceftriaxone (ROCEPHIN) 1 g/50 mL in dextrose IVPB 1,000 mg Intravenous New Bag     2025 0018 multi-electrolyte (Plasmalyte-A/Isolyte-S PH 7.4/Normosol-R) IV bolus 1,000 mL 1,000 mL Intravenous New Bag       Scheduled Medications:    cefTRIAXone, 1,000 mg, Intravenous,  Q24H  chlorhexidine, 15 mL, Mouth/Throat, Q12H JANNIE  ferrous sulfate, 325 mg, Oral, BID With Meals  heparin (porcine), 5,000 Units, Subcutaneous, Q8H JANNIE  insulin lispro, 1-6 Units, Subcutaneous, Q6H JANNIE  magnesium sulfate, 1 g, Intravenous, Once      Continuous IV Infusions:     PRN Meds:  levalbuterol, 1.25 mg, Nebulization, Q8H PRN  polyethylene glycol, 17 g, Oral, Daily PRN      ED Triage Vitals   Temperature Pulse Respirations Blood Pressure SpO2 Pain Score   05/11/25 2057 05/11/25 2051 05/11/25 2051 05/11/25 2051 05/11/25 2051 05/12/25 0019   (!) 97.4 °F   (36.3 °C) (!) 106 (!) 24 150/80 100 % 7     Weight (last 2 days)       Date/Time Weight    05/11/25 2148 89.2 (196.65)            Vital Signs (last 3 days)       Date/Time Temp Pulse Resp BP MAP  SpO2 O2 Flow Rate (L/min) Henny Coma Scale Score Pain    05/12/25 0830 -- 99 20 131/74 98 96 % -- -- --    05/12/25 0815 -- 102 18 114/62 84 97 % -- -- --    05/12/25 0814 -- -- -- -- -- -- -- -- 8    05/12/25 0800 -- 102 20 129/60 87 98 % -- 15 --    05/12/25 0745 -- 108 32 -- -- 96 % -- -- --    05/12/25 0730 -- 79 11 158/70 101 100 % -- -- --    05/12/25 0115 -- 81 20 159/89 115 100 % -- -- --    05/12/25 0100 -- 84 11 176/85 120 100 % -- 14 --    05/12/25 0045 -- 95 14 153/77 110 100 % -- -- --    05/12/25 0044 -- -- -- -- -- 100 % -- -- --    05/12/25 0031 98.4 °F (36.9 °C) 108 12 158/74 102 99 % -- -- --    05/12/25 0019 -- -- -- -- -- -- -- 15 7    05/11/25 2311 -- 99 14 144/79 -- 98 % -- 15 --    05/11/25 2209 -- 96 12 129/68 -- 99 % 2 L/min -- --    05/11/25 2129 -- 97 17 121/76 -- 98 % 2 L/min -- --    05/11/25 2104 -- -- -- -- -- -- -- 14 --    05/11/25 2057 97.4 °F (36.3 °C) -- -- -- -- -- -- -- --    05/11/25 2054 -- -- -- -- -- -- -- -- --    05/11/25 2051 -- 106 24 150/80 -- 100 % -- -- --    05/11/25 2050 -- -- -- -- -- -- -- 14 --         Pertinent Labs/Diagnostic Test Results:   Radiology:  XR chest 1 view portable   ED  (05/11 2217)   Mild  perihilar fullness, mild blunting left costophrenic angle            Results from last 7 days   Lab Units 05/12/25  0449 05/11/25  2105   WBC Thousand/uL 9.00 10.07   HEMOGLOBIN g/dL 8.1* 10.4*   HEMATOCRIT % 28.3* 36.9   PLATELETS Thousands/uL 290 380   TOTAL NEUT ABS Thousands/µL 5.88 6.53         Results from last 7 days   Lab Units 05/12/25  0538 05/12/25  0449 05/12/25  0130 05/11/25  2105   SODIUM mmol/L 140 133* 140 140   POTASSIUM mmol/L 4.1 3.6 4.3 4.1   CHLORIDE mmol/L 108 100 106 108   CO2 mmol/L 23 21 23 22   ANION GAP mmol/L 9 12 11 10   BUN mg/dL 11 10 12 13   CREATININE mg/dL 0.83 0.77 0.88 1.15   EGFR ml/min/1.73sq m 79 87 74 53   CALCIUM mg/dL 7.3* 6.4* 7.1* 8.1*   CALCIUM, IONIZED mmol/L  --  0.98*  --   --    MAGNESIUM mg/dL 1.8* 1.6*  --   --    PHOSPHORUS mg/dL  --  2.9  --   --      Results from last 7 days   Lab Units 05/12/25  0449 05/12/25 0130 05/12/25  0043 05/11/25  2105   AST U/L 27 34 38 53*   ALT U/L 27 32 36 41   ALK PHOS U/L 66 79 85 116*   TOTAL PROTEIN g/dL 4.8* 5.4* 5.6* 6.0*   ALBUMIN g/dL 2.8* 3.4* 3.5 3.9   TOTAL BILIRUBIN mg/dL 0.27 0.23 0.25 0.38   BILIRUBIN DIRECT mg/dL  --   --  0.00  --      Results from last 7 days   Lab Units 05/12/25  0553 05/12/25  0055 05/12/25  0011 05/11/25  2100   POC GLUCOSE mg/dl 111 134 150* 288*     Results from last 7 days   Lab Units 05/12/25  0538 05/12/25  0449 05/12/25  0130 05/11/25  2105   GLUCOSE RANDOM mg/dL 113 433* 202* 286*         Results from last 7 days   Lab Units 05/11/25  2348   HEMOGLOBIN A1C % 6.7*   EAG mg/dl 146       Results from last 7 days   Lab Units 05/12/25  0449 05/12/25  0130 05/11/25  2348   PH RUSS  7.273* 7.242* 7.106*   PCO2 RUSS mm Hg 40.0* 50.9* 55.7*   PO2 RUSS mm Hg 42.6 40.9 42.3   HCO3 RUSS mmol/L 18.1* 21.4* 17.1*   BASE EXC RUSS mmol/L -8.1 -5.9 -12.1   O2 CONTENT RUSS ml/dL 8.8 8.8 9.4   O2 HGB, VENOUS % 74.5 63.8 70.3         Results from last 7 days   Lab Units 05/11/25  2120   CK TOTAL U/L 50     Results  from last 7 days   Lab Units 05/12/25  0449 05/12/25  0259 05/12/25  0043   HS TNI 0HR ng/L  --   --  16   HS TNI 2HR ng/L  --  22  --    HSTNI D2 ng/L  --  6  --    HS TNI 4HR ng/L 17  --   --    HSTNI D4 ng/L 1  --   --                  Results from last 7 days   Lab Units 05/12/25  0130   PROCALCITONIN ng/ml 0.13     Results from last 7 days   Lab Units 05/12/25  0130 05/11/25  2305   LACTIC ACID mmol/L 0.9 <0.2*                 Results from last 7 days   Lab Units 05/11/25  2120   FERRITIN ng/mL 7*   IRON SATURATION % 5*   IRON ug/dL 22*   TIBC ug/dL 471.8*     Results from last 7 days   Lab Units 05/11/25 2120   TRANSFERRIN mg/dL 337                             Results from last 7 days   Lab Units 05/11/25  2120   CLARITY UA  Turbid   COLOR UA  Yellow   SPEC GRAV UA  1.018   PH UA  5.5   GLUCOSE UA mg/dl >=1000 (1%)*   KETONES UA mg/dl Negative   BLOOD UA  Negative   PROTEIN UA mg/dl Trace*   NITRITE UA  Positive*   BILIRUBIN UA  Negative   UROBILINOGEN UA (BE) mg/dl <2.0   LEUKOCYTES UA  Elevated glucose may cause decreased leukocyte values. See urine microscopic for UWBC result*   WBC UA /hpf 4-10*   RBC UA /hpf None Seen   BACTERIA UA /hpf Moderate*   EPITHELIAL CELLS WET PREP /hpf None Seen             Results from last 7 days   Lab Units 05/11/25 2120   AMPH/METH  Negative   BARBITURATE UR  Negative   BENZODIAZEPINE UR  Negative   COCAINE UR  Negative   METHADONE URINE  Negative   OPIATE UR  Negative   PCP UR  Negative   THC UR  Negative     Results from last 7 days   Lab Units 05/12/25  0449 05/12/25  0043 05/11/25  2105   ETHANOL LVL mg/dL  --   --  <10   ACETAMINOPHEN LVL ug/mL 3* 7* 23*   SALICYLATE LVL mg/dL  --   --  <5*     Results from last 7 days   Lab 05/12/25  0259 05/12/25  0222   BLOOD CULTURE Received in Microbiology Lab. Culture in Progress. Received in Microbiology Lab. Culture in Progress.       Past Medical History:   Diagnosis Date    Arthritis     Asthma     CPAP (continuous positive  airway pressure) dependence     Depression     Diabetes mellitus (HCC)     Gait disturbance     uses w/c for long distance    Gastritis     Hiatal hernia     Hx of fusion of cervical spine     Hypertension     Migraines     Morbid obesity (HCC)     PONV (postoperative nausea and vomiting)     difficulty awakening- too much CO2    Pulmonary emboli (HCC)     hx blood clot also in neck    S/P insertion of spinal cord stimulator     Sleep apnea     Tachycardia     Wears contact lenses      Present on Admission:   Obesity, Class II, BMI 35-39.9   Type 2 diabetes mellitus without complication, without long-term current use of insulin (HCC)   Obstructive sleep apnea syndrome   h/o Adrenal insufficiency (HCC)      Admitting Diagnosis:     Intentional drug overdose, initial encounter (HCC) [T50.902A]  Intentional overdose (HCC) [T50.902A]    Age/Sex: 54 y.o. female    Network Utilization Review Department  ATTENTION: Please call with any questions or concerns to [phone] and carefully listen to the prompts so that you are directed to the right person. All voicemails are confidential.   For Discharge needs, contact Care Management DC Support Team at [phone] opt. [phone]  Send all requests for admission clinical reviews, approved or denied determinations and any other requests to dedicated fax number below belonging to the campus where the patient is receiving treatment. List of dedicated fax numbers for the Facilities:  FACILITY NAME UR FAX NUMBER   ADMISSION DENIALS (Administrative/Medical Necessity) [phone]   DISCHARGE SUPPORT TEAM (NETWORK) [phone]   PARENT CHILD HEALTH (Maternity/NICU/Pediatrics) [phone]   [facility] [phone]   [facility] [phone]   formerly [facility] [phone]   [facility] [phone]   [facility] [phone]   [facility]  [phone]   [facility] [phone]   [facility] [phone]   [facility] [phone]   [facility] [phone]   [facility] [phone]   [facility] [phone]

## 2025-05-12 NOTE — ED PROVIDER NOTES
Time reflects when diagnosis was documented in both MDM as applicable and the Disposition within this note       Time User Action Codes Description Comment    5/11/2025 10:07 PM Smooth Dalton Add [T50.902A] Intentional drug overdose, initial encounter (HCC)     5/12/2025 12:55 AM Yamileth Seay [N39.0] Urinary tract infection           ED Disposition       ED Disposition   Admit    Condition   Stable    Date/Time   Sun May 11, 2025 11:15 PM    Comment   Case was discussed with Meli and the patient's admission status was agreed to be Admission Status: inpatient status to the service of Dr. Mercer.               Assessment & Plan       Medical Decision Making  Patient presents with:  Overdose - Intentional: Pt tried To OD on her prescription medication. Left a note for the .   DDx includes  intentional ingestion, toxidrome, overdose in the setting of unknown medication quantity or ingestion  Workup per ED course: GCS 14, somnolent but arousable satting +95% on 4 L initially titrated to 1 L without work of breathing.  Patient acidotic with elevated acetaminophen level prompting NAC initiation with additional intervention/monitoring per poison control as noted in ED course.  Ceftriaxone given empirically c/f UTI. Given possible out-of-hospital cardiac arrest and persistent somnolence, patient discussed with critical care team who accepted admission for further workup and resuscitation.      Amount and/or Complexity of Data Reviewed  Labs: ordered. Decision-making details documented in ED Course.  Radiology: ordered and independent interpretation performed.  ECG/medicine tests:  Decision-making details documented in ED Course.    Risk  Decision regarding hospitalization.        ED Course as of 05/12/25 0156   Sun May 11, 2025   2113 Presents via after intentional overdose of of unknown medication or quantity.  Per patient's  who called EMS, he last saw her approximately 6:30 PM and subsequently  received a text message approximately 7 PM that she had fed the dog and said goodbye after indicating she had taken a bunch of her medication.  Approximately 7:30 PM he found her snoring and unresponsive in bed, placed on the floor and initiated CPR as he was unable to palpate a strong pulse.  Unclear if patient experienced true out-of-hospital cardiac arrest.  Received 4 mg Narcan intranasal/IM with minimal improvement per EMS. GCS14 on arrival. Sleepy but rousable.  History limited as patient non verbal upon initial assessment.     Will evaluate for intentional ingestion, toxidrome, overdose in the setting of unknown medication quantity or ingestion         2200 ECG 12 lead   2200 ECG per my independent interpretation:   Normal rate, regular rhythm, normal axis,   NV/QRS/within normal limits; Qtc prolongation  no ST elevations or depressions; T waves inversion lead III  Compared to prior ECG; now with nonspecific T wave abnormalities   2208 SALICYLATE LEVEL(!): <5   2208 ACETAMINOPHEN LEVEL(!): 23  NAC ordered   2210 HYDROCODONE URINE(!): Positive   2215 POC Glucose(!): 288   2215 pH, Rahul(!!): 7.168   2215 pCO2, Rahul(!): 56.8   2215 Nitrite, UA(!): Positive  Empirically given ceftriaxone as UA concerning for UTI.     2216 WBC, UA(!): 4-10   2216 Ca Oxalate Elyssa, UA(!): Moderate   2216 Bacteria, UA(!): Moderate   2222 AST(!): 53   2228 ALT: 41  Baseline    2252 Patient discussed with critical care, final disposition pending critical care evaluation   Mon May 12, 2025   0018 Patient admitted to critical care for further workup and resuscitation   0035 Patient discussed with Poison Control Center to review most recent discharge medication list she is unable to confirm medications she consumed with     Presentation/recent discharge medication list reviewed with the Poison Control Center/ was unable to confirm which medications she had taken.   Will monitor for respiratory/CNS depression, blood glucose trend, and  initiate iron panel.     Regarding NAC given for elevated acetaminophen level, recommend reevaluating LFTs and APAP level with repeat second NAC course if LFTs uptrending or APAP greater than 10       Medications   acetylcysteine (ACETADOTE) 4,460 mg in dextrose 5 % 500 mL IVPB (4,460 mg Intravenous New Bag 5/11/25 2342)   acetylcysteine (ACETADOTE) 8,920 mg in dextrose 5 % 1,000 mL IVPB (has no administration in time range)   chlorhexidine (PERIDEX) 0.12 % oral rinse 15 mL (has no administration in time range)   multi-electrolyte (Plasmalyte-A/Isolyte-S PH 7.4/Normosol-R) IV solution (has no administration in time range)   heparin (porcine) subcutaneous injection 5,000 Units (has no administration in time range)   insulin lispro (HumALOG/ADMELOG) 100 units/mL subcutaneous injection 1-6 Units (has no administration in time range)   albuterol (PROVENTIL HFA,VENTOLIN HFA) inhaler 2 puff (has no administration in time range)   naloxone (FOR EMS ONLY) (NARCAN) 2 MG/2ML injection 4 mg (0 mg Does not apply Given to EMS 5/11/25 2116)   sodium chloride 0.9 % bolus 1,000 mL (0 mL Intravenous Stopped 5/11/25 2222)   acetylcysteine (ACETADOTE) 13,380 mg in dextrose 5 % 200 mL IVPB (0 mg Intravenous Stopped 5/11/25 2340)   ceftriaxone (ROCEPHIN) 1 g/50 mL in dextrose IVPB (0 mg Intravenous Stopped 5/11/25 2340)   multi-electrolyte (Plasmalyte-A/Isolyte-S PH 7.4/Normosol-R) IV bolus 1,000 mL (1,000 mL Intravenous New Bag 5/12/25 0018)       ED Risk Strat Scores                    No data recorded                            History of Present Illness       Chief Complaint   Patient presents with    Overdose - Intentional     Pt tried To OD on her prescription medication. Left a note for the .          Past Medical History:   Diagnosis Date    Arthritis     Asthma     CPAP (continuous positive airway pressure) dependence     Depression     Diabetes mellitus (HCC)     Gait disturbance     uses w/c for long distance     Gastritis     Hiatal hernia     Hx of fusion of cervical spine     Hypertension     Migraines     Morbid obesity (HCC)     PONV (postoperative nausea and vomiting)     difficulty awakening- too much CO2    Pulmonary emboli (HCC)     hx blood clot also in neck    S/P insertion of spinal cord stimulator     Sleep apnea     Tachycardia     Wears contact lenses       Past Surgical History:   Procedure Laterality Date    COLONOSCOPY      DISCECTOMY SPINE CERVICAL POSTERIOR      HYSTERECTOMY      and bladder sling    JOINT REPLACEMENT      left TKR and partial right    NECK SURGERY      cervical fusion    HI LAPS GSTR RSTCV PX W/BYP GWYN-EN-Y LIMB <150 CM N/A 11/30/2021    Procedure: ROBOTIC BYPASS GASTRIC GWYN-EN-Y,INTRAOP EGD, PARAESOPHAGEAL HERNIA REPAIR W/ MESH;  Surgeon: Gabriel Gutierrez MD;  Location: [facility];  Service: Bariatrics    SHOULDER OPEN ROTATOR CUFF REPAIR      SPINAL CORD STIMULATOR IMPLANT        Family History   Problem Relation Age of Onset    Arthritis Mother     Asthma Mother     Depression Mother     Heart disease Father     Diabetes Father     Diabetes Sister     Heart disease Paternal Uncle     Heart disease Paternal Grandfather       Social History     Tobacco Use    Smoking status: Never    Smokeless tobacco: Never   Vaping Use    Vaping status: Never Used   Substance Use Topics    Alcohol use: Yes     Comment: few x year    Drug use: No      E-Cigarette/Vaping    E-Cigarette Use Never User       E-Cigarette/Vaping Substances    Nicotine No     THC No     CBD No     Flavoring No     Other No     Unknown No       I have reviewed and agree with the history as documented.     Marva Salgado is a 54 y.o. female presents after intentional overdose   Per patient's  who called EMS, he last saw her approximately 6:30 PM and subsequently received a text message approximately 7 PM that she had fed the dog and said goodbye after indicating she had taken a bunch of her medication.  Approximately  7:30 PM he found her snoring and unresponsive in bed, placed on the floor and initiated CPR as he was unable to palpate a strong pulse.  Unclear if patient experienced true out-of-hospital cardiac arrest.  Received 4 mg Narcan intranasal/IM with minimal improvement per EMS. GCS14 on arrival. Sleepy but rousable.  History limited as patient non verbal upon initial assessment.       All other systems reviewed and are negative      Overdose - Intentional      Review of Systems        Objective       ED Triage Vitals   Temperature Pulse Blood Pressure Respirations SpO2 Patient Position - Orthostatic VS   05/11/25 2057 05/11/25 2051 05/11/25 2051 05/11/25 2051 05/11/25 2051 05/11/25 2051   (!) 97.4 °F (36.3 °C) (!) 106 150/80 (!) 24 100 % Lying      Temp Source Heart Rate Source BP Location FiO2 (%) Pain Score    05/11/25 2057 05/11/25 2209 05/11/25 2051 -- 05/12/25 0019    Rectal Monitor Right arm  7      Vitals      Date and Time Temp Pulse SpO2 Resp BP Pain Score FACES Pain Rating User   05/12/25 0044 -- -- 100 % -- -- -- -- KN   05/12/25 0031 98.4 °F (36.9 °C) 108 99 % 12 158/74 -- -- TC   05/12/25 0019 -- -- -- -- -- 7 -- JR   05/11/25 2311 -- 99 98 % 14 144/79 -- -- JR   05/11/25 2209 -- 96 99 % 12 129/68 -- -- JR   05/11/25 2129 -- 97 98 % 17 121/76 -- -- JR   05/11/25 2057 97.4 °F (36.3 °C) -- -- -- -- -- -- JR   05/11/25 2051 -- 106 100 % 24 150/80 -- --             Physical Exam    General appearance: Somnolent but arousable, in acute distress, GCS 14  HENT: Normocephalic, atraumatic, hearing grossly intact, and mucous membranes moist   Eyes: Conjunctiva normal, PERRL  Lungs/Chest: Tachypnea, with mildly increased work of breathing , breath sounds bilateral   Cardiovascular: Tachycardia   Abdomen: soft, non-distended, non-tender  Musculoskeletal: extremities normal, atraumatic, no cyanosis or edema   Skin: warm and dry   Neurologic: Somnolent but rousable no apparent focal deficit  Psych: Mood consistent with  "affect     Results Reviewed       Procedure Component Value Units Date/Time    HS Troponin 0hr (reflex protocol) [[id number]]  (Normal) Collected: 05/12/25 0043    Lab Status: Final result Specimen: Blood from Arm, Right Updated: 05/12/25 0118     hs TnI 0hr 16 ng/L     Hepatic function panel [[id number]]  (Abnormal) Collected: 05/12/25 0043    Lab Status: Final result Specimen: Blood from Arm, Right Updated: 05/12/25 0108     Total Bilirubin 0.25 mg/dL      Bilirubin, Direct 0.00 mg/dL      Alkaline Phosphatase 85 U/L      AST 38 U/L      ALT 36 U/L      Total Protein 5.6 g/dL      Albumin 3.5 g/dL     Acetaminophen level-\"If concentration is detectable, please discuss with medical  on call.\" [[id number]]  (Abnormal) Collected: 05/12/25 0043    Lab Status: Final result Specimen: Blood from Arm, Right Updated: 05/12/25 0108     Acetaminophen Level 7 ug/mL     Fingerstick Glucose (POCT) [[id number]]  (Abnormal) Collected: 05/12/25 0011    Lab Status: Final result Specimen: Blood Updated: 05/12/25 0040     POC Glucose 150 mg/dl     Blood gas, venous [[id number]]  (Abnormal) Collected: 05/11/25 2348    Lab Status: Final result Specimen: Blood from Arm, Right Updated: 05/12/25 0002     pH, Rahul 7.106     pCO2, Rahul 55.7 mm Hg      pO2, Rahul 42.3 mm Hg      HCO3, Rahul 17.1 mmol/L      Base Excess, Rahul -12.1 mmol/L      O2 Content, Rahul 9.4 ml/dL      O2 HGB, VENOUS 70.3 %     Hemoglobin A1c w/EAG Estimation (Prechecked if no A1C within 90 days) [[id number]] Collected: 05/11/25 2348    Lab Status: In process Specimen: Blood from Arm, Right Updated: 05/11/25 2355    Lactic acid, plasma (w/reflex if result > 2.0) [[id number]]  (Abnormal) Collected: 05/11/25 2305    Lab Status: Final result Specimen: Blood from Arm, Right Updated: 05/11/25 2338     LACTIC ACID <0.2 mmol/L     Narrative:      Result may be elevated if tourniquet was used during collection.    TIBC Panel (incl. Iron, TIBC, % Iron Saturation) [[id number]] " Collected: 05/11/25 2120    Lab Status: In process Specimen: Blood from Arm, Right Updated: 05/11/25 2257    Ferritin [[id number]] Collected: 05/11/25 2120    Lab Status: In process Specimen: Blood from Arm, Right Updated: 05/11/25 2257    Urine Microscopic [[id number]]  (Abnormal) Collected: 05/11/25 2120    Lab Status: Final result Specimen: Urine, Straight Cath Updated: 05/11/25 2203     RBC, UA None Seen /hpf      WBC, UA 4-10 /hpf      Epithelial Cells None Seen /hpf      Bacteria, UA Moderate /hpf      Hyaline Casts, UA 10-25 /lpf      Amorphous Crystals, UA Occasional     Ca Oxalate Elyssa, UA Moderate /hpf     UA (URINE) with reflex to Scope [[id number]]  (Abnormal) Collected: 05/11/25 2120    Lab Status: Final result Specimen: Urine, Straight Cath Updated: 05/11/25 2157     Color, UA Yellow     Clarity, UA Turbid     Specific Gravity, UA 1.018     pH, UA 5.5     Leukocytes, UA Elevated glucose may cause decreased leukocyte values. See urine microscopic for UWBC result     Nitrite, UA Positive     Protein, UA Trace mg/dl      Glucose, UA >=1000 (1%) mg/dl      Ketones, UA Negative mg/dl      Urobilinogen, UA <2.0 mg/dl      Bilirubin, UA Negative     Occult Blood, UA Negative    Rapid drug screen, urine [[id number]]  (Abnormal) Collected: 05/11/25 2120    Lab Status: Final result Specimen: Urine, Catheter Updated: 05/11/25 2156     Amph/Meth UR Negative     Barbiturate Ur Negative     Benzodiazepine Urine Negative     Cocaine Urine Negative     Methadone Urine Negative     Opiate Urine Negative     PCP Ur Negative     THC Urine Negative     Oxycodone Urine Negative     Fentanyl Urine Negative     HYDROCODONE URINE Positive    Narrative:      Presumptive report. If requested, specimen will be sent to reference lab for confirmation.  FOR MEDICAL PURPOSES ONLY.   IF CONFIRMATION NEEDED PLEASE CONTACT THE LAB WITHIN 5 DAYS.    Drug Screen Cutoff Levels:  AMPHETAMINE/METHAMPHETAMINES  1000 ng/mL  BARBITURATES     200  ng/mL  BENZODIAZEPINES     200 ng/mL  COCAINE      300 ng/mL  METHADONE      300 ng/mL  OPIATES      300 ng/mL  PHENCYCLIDINE     25 ng/mL  THC       50 ng/mL  OXYCODONE      100 ng/mL  FENTANYL      5 ng/mL  HYDROCODONE     300 ng/mL    CK [[id number]]  (Normal) Collected: 05/11/25 2120    Lab Status: Final result Specimen: Blood from Arm, Right Updated: 05/11/25 2144     Total CK 50 U/L     Blood gas, venous [[id number]]  (Abnormal) Collected: 05/11/25 2120    Lab Status: Final result Specimen: Blood from Arm, Right Updated: 05/11/25 2135     pH, Rahul 7.168     pCO2, Rahul 56.8 mm Hg      pO2, Rahul 63.0 mm Hg      HCO3, Rahul 20.2 mmol/L      Base Excess, Rahul -8.6 mmol/L      O2 Content, Rahul 13.5 ml/dL      O2 HGB, VENOUS 84.1 %     Comprehensive metabolic panel [[id number]]  (Abnormal) Collected: 05/11/25 2105    Lab Status: Final result Specimen: Blood from Arm, Right Updated: 05/11/25 2129     Sodium 140 mmol/L      Potassium 4.1 mmol/L      Chloride 108 mmol/L      CO2 22 mmol/L      ANION GAP 10 mmol/L      BUN 13 mg/dL      Creatinine 1.15 mg/dL      Glucose 286 mg/dL      Calcium 8.1 mg/dL      AST 53 U/L      ALT 41 U/L      Alkaline Phosphatase 116 U/L      Total Protein 6.0 g/dL      Albumin 3.9 g/dL      Total Bilirubin 0.38 mg/dL      eGFR 53 ml/min/1.73sq m     Narrative:      [employer] guidelines for Chronic Kidney Disease (CKD):     Stage 1 with normal or high GFR (GFR > 90 mL/min/1.73 square meters)    Stage 2 Mild CKD (GFR = 60-89 mL/min/1.73 square meters)    Stage 3A Moderate CKD (GFR = 45-59 mL/min/1.73 square meters)    Stage 3B Moderate CKD (GFR = 30-44 mL/min/1.73 square meters)    Stage 4 Severe CKD (GFR = 15-29 mL/min/1.73 square meters)    Stage 5 End Stage CKD (GFR <15 mL/min/1.73 square meters)  Note: GFR calculation is accurate only with a steady state creatinine    Salicylate level [[id number]]  (Abnormal) Collected: 05/11/25 6476    Lab Status: Final result Specimen:  Blood from Arm, Right Updated: 05/11/25 2129     Salicylate Lvl <5 mg/dL     Acetaminophen level-If concentration is detectable, please discuss with medical  on call. [[id number]]  (Abnormal) Collected: 05/11/25 2105    Lab Status: Final result Specimen: Blood from Arm, Right Updated: 05/11/25 2129     Acetaminophen Level 23 ug/mL     Ethanol [[id number]]  (Normal) Collected: 05/11/25 2105    Lab Status: Final result Specimen: Blood from Arm, Right Updated: 05/11/25 2128     Ethanol Lvl <10 mg/dL     CBC and differential [[id number]]  (Abnormal) Collected: 05/11/25 2105    Lab Status: Final result Specimen: Blood from Arm, Right Updated: 05/11/25 2113     WBC 10.07 Thousand/uL      RBC 4.65 Million/uL      Hemoglobin 10.4 g/dL      Hematocrit 36.9 %      MCV 79 fL      MCH 22.4 pg      MCHC 28.2 g/dL      RDW 17.7 %      MPV 9.0 fL      Platelets 380 Thousands/uL      nRBC 0 /100 WBCs      Segmented % 65 %      Immature Grans % 1 %      Lymphocytes % 27 %      Monocytes % 6 %      Eosinophils Relative 1 %      Basophils Relative 0 %      Absolute Neutrophils 6.53 Thousands/µL      Absolute Immature Grans 0.10 Thousand/uL      Absolute Lymphocytes 2.69 Thousands/µL      Absolute Monocytes 0.57 Thousand/µL      Eosinophils Absolute 0.14 Thousand/µL      Basophils Absolute 0.04 Thousands/µL     Fingerstick Glucose (POCT) [[id number]]  (Abnormal) Collected: 05/11/25 2100    Lab Status: Final result Specimen: Blood Updated: 05/11/25 2101     POC Glucose 288 mg/dl             XR chest 1 view portable   ED Interpretation by Javier Sutton MD (05/11 2217)   Mild perihilar fullness, mild blunting left costophrenic angle          Procedures    ED Medication and Procedure Management   Prior to Admission Medications   Prescriptions Last Dose Informant Patient Reported? Taking?   DULoxetine (CYMBALTA) 60 mg delayed release capsule  Self Yes No   Sig: Take 60 mg by mouth every evening     EPINEPHrine (EPIPEN) 0.3  mg/0.3 mL SOAJ  Self Yes No   HYDROcodone-acetaminophen (HYCET) 7.5-325 MG/15ML solution   No No   Sig: Take 7.5 mL by mouth every 6 (six) hours as needed for moderate pain for up to 10 doses Max Daily Amount: 30 mL   albuterol (PROVENTIL HFA,VENTOLIN HFA) 90 mcg/act inhaler  Self Yes No   Sig: Inhale 2 puffs every 4 (four) hours as needed     atorvastatin (LIPITOR) 20 mg tablet   Yes Yes   Sig: Take 20 mg by mouth daily   cyanocobalamin 1,000 mcg/mL   Yes Yes   Sig: Inject 1,000 mcg under the skin every 30 (thirty) days   eletriptan (RELPAX) 40 MG tablet   Yes Yes   Sig: Take 40 mg by mouth once as needed for migraine   ferrous gluconate (FERGON) 324 mg tablet   Yes Yes   Sig: Take 324 mg by mouth daily with breakfast   gabapentin (NEURONTIN) 300 mg capsule  Self Yes No   Sig: Take 300 mg by mouth daily In am    gabapentin (NEURONTIN) 600 MG tablet  Self Yes No   Sig: Take 1,200 mg by mouth daily at bedtime     methocarbamol (ROBAXIN) 500 mg tablet   Yes Yes   Sig: Take 500 mg by mouth 3 (three) times a day   ofloxacin (FLOXIN) 0.3 % otic solution   No No   Sig: Administer 5 drops into ears 2 (two) times a day   omeprazole (PriLOSEC) 20 mg delayed release capsule   No No   Sig: TAKE 1 CAPSULE DAILY      Facility-Administered Medications: None     Current Discharge Medication List        CONTINUE these medications which have NOT CHANGED    Details   atorvastatin (LIPITOR) 20 mg tablet Take 20 mg by mouth daily      cyanocobalamin 1,000 mcg/mL Inject 1,000 mcg under the skin every 30 (thirty) days      eletriptan (RELPAX) 40 MG tablet Take 40 mg by mouth once as needed for migraine      ferrous gluconate (FERGON) 324 mg tablet Take 324 mg by mouth daily with breakfast      methocarbamol (ROBAXIN) 500 mg tablet Take 500 mg by mouth 3 (three) times a day      albuterol (PROVENTIL HFA,VENTOLIN HFA) 90 mcg/act inhaler Inhale 2 puffs every 4 (four) hours as needed      Comments: <!--EPICS-->Substitution to a formulary  equivalent within the same pharmaceutical class is authorized.<!--EPICE-->      DULoxetine (CYMBALTA) 60 mg delayed release capsule Take 60 mg by mouth every evening        EPINEPHrine (EPIPEN) 0.3 mg/0.3 mL SOAJ       gabapentin (NEURONTIN) 300 mg capsule Take 300 mg by mouth daily In am       gabapentin (NEURONTIN) 600 MG tablet Take 1,200 mg by mouth daily at bedtime        HYDROcodone-acetaminophen (HYCET) 7.5-325 MG/15ML solution Take 7.5 mL by mouth every 6 (six) hours as needed for moderate pain for up to 10 doses Max Daily Amount: 30 mL  Qty: 75 mL, Refills: 0    Associated Diagnoses: Obesity, Class II, BMI 35-39.9      ofloxacin (FLOXIN) 0.3 % otic solution Administer 5 drops into ears 2 (two) times a day  Qty: 5 mL, Refills: 0    Associated Diagnoses: Otitis media      omeprazole (PriLOSEC) 20 mg delayed release capsule TAKE 1 CAPSULE DAILY  Qty: 30 capsule, Refills: 3    Associated Diagnoses: Obesity, Class II, BMI 35-39.9           No discharge procedures on file.  ED SEPSIS DOCUMENTATION   Time reflects when diagnosis was documented in both MDM as applicable and the Disposition within this note       Time User Action Codes Description Comment    5/11/2025 10:07 PM Smooth Dalton [T50.902A] Intentional drug overdose, initial encounter (HCC)     5/12/2025 12:55 AM Yamileth Seay [N39.0] Urinary tract infection            Initial Sepsis Screening       Row Name 05/12/25 0045                Is the patient's history suggestive of a new or worsening infection? Yes (Proceed)  -NG        Suspected source of infection urinary tract infection  -NG        Indicate SIRS criteria Tachycardia > 90 bpm;Tachypnea > 20 resp per min  -NG        Are two or more of the above signs & symptoms of infection both present and new to the patient? Yes (Proceed)  -NG        Assess for evidence of organ dysfunction: Are any of the below criteria present within 6 hours of suspected infection and SIRS criteria that are  "NOT considered to be chronic conditions? New need for invasive/non-invasive ventilation  -NG        Date of presentation of severe sepsis 05/12/25  -NG        Time of presentation of severe sepsis 0047  -        Sepsis Note: Click \"NEXT\" below (NOT \"close\") to generate sepsis note based on above information. YES (proceed by clicking \"NEXT\")  -                  User Key  (r) = Recorded By, (t) = Taken By, (c) = Cosigned By      Initials Name Provider Type     ELISA Mckinnon Nurse Practitioner                       Javier Sutton MD  05/12/25 0156    "

## 2025-05-12 NOTE — ASSESSMENT & PLAN NOTE
Non-compliant with CPAP    Plan:  Currently on BiPAP for acute hypoxic and hypercarbic respiratory failure

## 2025-05-12 NOTE — CONSULTS
Consultation - Behavioral Health   Name: Marva Salgado 54 y.o. female I MRN: [id number]  Unit/Bed#: ICU 10 I Date of Admission: 5/11/2025   Date of Service: 5/12/2025 I Hospital Day: 1       Inpatient consult to Psychiatry     Date/Time  5/12/2025 9:56 AM     Performed by  Eulalia Nayak DO   Authorized by  ELISA Mckinnon           Physician Requesting Evaluation: Andrade Pitt,*   Reason for Evaluation / Principal Problem: intentional drug overdose    Assessment & Plan  Intentional overdose (HCC)  Assessment:    Marva Salgado is a 54 y.o. female, , with past psychiatric history of depression and anxiety, and past medical history of type 2 diabetes melitis, CHIDI, adrenal insufficiency, asthma, chronic pain, migraines who was admitted to [facility] ICU on 5/11/2025 due to intentional overdose. Psychiatric consultation was requested for management of intentional overdose.  On evaluation, patient is calm, cooperative, guarded, minimizing events.  After speak with patient's ex- and close friend, there appears to be text messages saying that she took a lot of pills and made goodbye statements.  Although patient adamantly denies that this was a suicide attempt, she does appear to be minimizing.  She also has a similar past attempt around 10 years ago.  At this time, patient is presently adhering to criteria for inpatient psychiatric admission.     Principal Psychiatric Problem:  Unspecified mood disorder (HCC)  Differential includes, but is not limited to: MDD v. Adjustment disorder    Plan:   Discussed with primary team, with the following recommendations:    Admission labs reviewed.  At this time, patient is not agreeable to 201 voluntary commitment.  Will reevaluate once patient is medically cleared.  Patient is currently not safe for discharge. If patient wishes to leave, a 302 process should be initiated for involuntary psychiatric treatment.  Recommend the following changes in  "psychiatric medication at this time:  Restart Cymbalta 90 mg daily for mood, chronic pain, and anxiety  Observation level: 1:1 observation for patient safety  Collaborate with collaterals for baseline assessment and disposition as indicated  Psychiatry will continue to follow as needed. Please contact our service via [employer] Chat with any additional questions or concerns. If contacting after hours, please call or Epic Chat the on-call team (MAIRLYNCHRISTINE: [phone]) with any questions or concerns.    Risks, benefits and possible side effects of Medications: No new medications at this time.   Obesity, Class II, BMI 35-39.9  Per primary team  Type 2 diabetes mellitus without complication, without long-term current use of insulin (HCC)  Lab Results   Component Value Date    HGBA1C 6.7 (H) 05/11/2025       Recent Labs     05/11/25  2100 05/12/25  0011 05/12/25  0055 05/12/25  0553   POCGLU 288* 150* 134 111       Blood Sugar Average: Last 72 hrs:  (P) 170.75  Per primary team  Obstructive sleep apnea syndrome  Per primary team  H/O gastric bypass  Per primary team  h/o Adrenal insufficiency (HCC)  Per primary team  Urinary tract infection  Per primary team  Acute respiratory failure with hypoxia and hypercapnia (HCC)  Per primary team  Encephalopathy, toxic  Per primary team  Metabolic acidosis  Per primary team  Severe sepsis (HCC)  Per primary team  Chronic pain  Per primary team  Depression  See principal problem         Chief Complaint: \"My ex- said I tried to kill myself\"    Marva Salgado is a 54 y.o. female, , with past psychiatric history of depression and anxiety, and past medical history of type 2 diabetes melitis, CHIDI, adrenal insufficiency, asthma, chronic pain, migraines who was admitted to [facility] ICU on 5/11/2025 due to intentional overdose. Psychiatric consultation was requested for management of intentional overdose.    Per ED provider, Smooth Dalton MD, on 5/11/2025:    \"54-year-old " "female brought in by EMS after being found unconscious at home with an apparent suicide note suspected to have overdosed on her own medications. Reported that she had CPR in the field but unclear if she was ever pulseless/apneic. EMS gave intranasal and IV Narcan with some improvement. Upon arrival to the emergency department patient is very drowsy, eyes closed but does open eyes to voice and follow commands. No focal deficits. She answers yes/no questions by shaking her head or nodding but minimal verbalization. She arrived on nonrebreather mask and had a nasal trumpet in place. Switched to 2 L nasal cannula maintaining normal saturation. Nasal trumpet removed. Heart sounds normal. Breath sounds are clear. No external signs of trauma. Patient last seen by  around 630.  She sent a text message to him around 7 PM noting that she fed the dogs, had taken a bunch of medications, and stated \"bye.\"  She does have a history of suicide attempt in the past approximately 10 years ago.   reports that they are  and are going through divorce proceedings.  Son reports that he is fairly estranged and like the patient feels left out of his wedding planning and may have her symptoms exacerbated because it is Mother's Day.\"    On initial psychiatric evaluation, Marva appears calm, cooperative, minimizing events.  She states that she is here because her ex- said that she tried to kill herself.  She said that she took all of her nighttime medications including 2 tablets of Robaxin, gabapentin 1200 mg, 2 tablets of omeprazole, atorvastatin 20 mg, duloxetine 90 mg, 1 tablet acetaminophen/codeine, 8 mg of Dilaudid, and 4 tablets of NyQuil.  Patient reports that she has been suffering from a migraine for 26 days and tried to duplicate the regimen that she received in the hospital.  She states that she also had been congested and was taking the NyQuil to help with that.  She states that she also broke her femur " "3 months ago and has been suffering with chronic pain.  She states that her intent with taking the medication was to alleviate her migraine and to help her fall asleep.  Patient reports that her overall mood has been \"upset.\"  She states that her son is getting  and he recently told her that she is not invited to the wedding.  She states that this made her feel helpless at times.  She states that her sleep has been poor and her appetite has been increased.  She states that her appetite has been increased due to her steroids.  She denies problems with energy, concentration, memory.  Patient denies current suicidal or homicidal ideation, intent, or plan.    Patient denies current symptoms of or history of noel.  She reports anxiety symptoms such as daily worrying.  She denies auditory or visual hallucinations, paranoia, other delusions.    Patient gave verbal consent to speak with her friend, Soraida who is at bedside.  She reports that before the patient came to the hospital and the overdose, the patient spoke with her on the phone saying that she felt drowsy and that she took some sedating pills.  After they hung up the phone, the patient messaged Soraida saying \"thank you for loving me.\"  Soraida said that this was concerning for her.  She states that this is unusual for her to say something like this to her.  Soraida does believe that this may have been an intentional overdose with everything going on with her son and it being Mother's Day.    Patient gave verbal consent to speak with her ex-, Sin Salgado.    Writer spoke with Sin Salgado via telephone ([phone]).  He reports that there has been a lot going on.  He states that with his son saying that she cannot go to his wedding and with him being Mother's Day he thinks that this was a suicide attempt.  He states that patient texted her friend saying that she took a lot of medications.  He reports that her friend texted him saying to go " "check on her.  He states that when he went to check on her she was not breathing and he called 911 and they started CPR.  He reports that when he went to look at his phone he saw a text message from her saying \"took all kinds of meds… Bye.\"  He states that she has done this in the past last approximately 8 to 10 years ago.    Psychiatric ROS and PMHx     Psychiatric Review Of Systems:  Sleep: insomnia  Interest/Anhedonia: Denies  Guilt/hopeless: Denies  Low energy/anergy: Denies  Poor Concentration: Denies  Appetite changes: Yes, increased  Weight changes: Denies  Somatic symptoms: Denies  Anxiety/panic: Yes  [place]: no  Self injurious behavior/risky behavior: no  Trauma: no   If yes: none    Suicidal ideation: no  Homicidal ideation: no  Auditory hallucinations: no  Visual hallucinations: no  Other hallucinations: Denies  Delusional thinking: no    Historical Information     Past Psychiatric History:   Past Inpatient Psychiatric Treatment:   1 past inpatient psychiatric hospitalization approximately 10 years ago after reported suicide attempt  Past Outpatient Psychiatric Treatment:    Not in outpatient treatment recently  Past Suicide Attempts: yes  Past Violent Behavior: no  Past Psychiatric Medication Trials: Prozac and Cymbalta    Substance Abuse History:  Social History       Tobacco History       Smoking Status  Never      Smokeless Tobacco Use  Never              Alcohol History       Alcohol Use Status  Yes Comment  few x year              Drug Use       Drug Use Status  No              Sexual Activity       Sexually Active  Not Asked              Other Factors    Not Asked                   I have assessed this patient for substance use within the past 12 months    Alcohol use: Denies current use  Marijuana: Denies current use  Other substance use:  Denies current use  Longest clean time: not applicable  History of Inpatient/Outpatient rehabilitation program: no  Nicotine use: denies use    Social " History:  Education: bachelor's degree in nutrition  Learning Disabilities: none  Marital History:   Children: 3 adult children: Son age 23, son age 29, daughter age 34  Living Arrangement: divides time living with her friend Soraida in [place] and her ex-  Occupational History: on permanent disability  Functioning Relationships: limited support system  Legal History: none   History: None  Access to Firearms: no    Past Medical History:  History of Seizures: no    Past Medical History:   Diagnosis Date    Arthritis     Asthma     CPAP (continuous positive airway pressure) dependence     Depression     Diabetes mellitus (HCC)     Gait disturbance     uses w/c for long distance    Gastritis     Hiatal hernia     Hx of fusion of cervical spine     Hypertension     Migraines     Morbid obesity (HCC)     PONV (postoperative nausea and vomiting)     difficulty awakening- too much CO2    Pulmonary emboli (HCC)     hx blood clot also in neck    S/P insertion of spinal cord stimulator     Sleep apnea     Tachycardia     Wears contact lenses      Past Surgical History:   Procedure Laterality Date    COLONOSCOPY      DISCECTOMY SPINE CERVICAL POSTERIOR      HYSTERECTOMY      and bladder sling    JOINT REPLACEMENT      left TKR and partial right    NECK SURGERY      cervical fusion    GA LAPS GSTR RSTCV PX W/BYP GWYN-EN-Y LIMB <150 CM N/A 11/30/2021    Procedure: ROBOTIC BYPASS GASTRIC GWYN-EN-Y,INTRAOP EGD, PARAESOPHAGEAL HERNIA REPAIR W/ MESH;  Surgeon: Gabriel Gutierrez MD;  Location: [facility];  Service: Bariatrics    SHOULDER OPEN ROTATOR CUFF REPAIR      SPINAL CORD STIMULATOR IMPLANT         Mental Status Evaluation and Medical ROS     Medical Review Of Systems:  Pertinent items are noted in HPI.    Meds/Allergies   All current active medications have been reviewed.  Allergies   Allergen Reactions    Iodine - Food Allergy Anaphylaxis           Lyrica [Pregabalin] Other (See Comments)     Suicidal  "ideations    Shellfish Allergy - Food Allergy Anaphylaxis    Wellbutrin [Bupropion] Hives     Shaky and dizzy    Wound Dressing Adhesive Hives     Including tegaderm dressing, paper tape ok but can react to it     Latex Hives    Percocet [Oxycodone-Acetaminophen] Itching       Objective   Vital signs in last 24 hours:  Temp:  [97.4 °F (36.3 °C)-98.4 °F (36.9 °C)] 98.4 °F (36.9 °C)  HR:  [] 99  BP: (114-176)/(60-89) 131/74  Resp:  [11-32] 20  SpO2:  [96 %-100 %] 96 %  O2 Device: None (Room air)      Intake/Output Summary (Last 24 hours) at 5/12/2025 0957  Last data filed at 5/12/2025 0800  Gross per 24 hour   Intake 4214.43 ml   Output 300 ml   Net 3914.43 ml       Mental Status Evaluation:  Appearance:  age appropriate, dressed in hospital attire, looks stated age, overweight   Behavior:  cooperative, calm, guarded, fair eye contact, minimizing   Speech:  normal rate and volume, clear, coherent   Mood:  \"Upset\"   Affect:  [place] bright   Language: naming objects and repeating phrases   Thought Process:  organized, logical, goal directed   Associations: intact associations   Thought Content:  no overt delusions   Perceptual Disturbances: no auditory hallucinations, no visual hallucinations   Risk Potential: Suicidal ideation - None at present  Homicidal ideation - None at present  Potential for aggression - No   Sensorium:  oriented to person, place, and time/date   Memory:  recent and remote memory grossly intact   Consciousness:  alert and awake   Attention/Concentration: attention span and concentration are age appropriate   Intellect: within normal limits   Fund of Knowledge: awareness of current events: yes   Insight:  limited   Judgment: limited   Muscle Strength Muscle Tone: normal  normal   Gait/Station: in bed   Motor Activity: no abnormal movements     Laboratory Results: I have personally reviewed all pertinent laboratory/tests results    Most Recent Labs:   Lab Results   Component Value Date    WBC " 9.00 05/12/2025    RBC 3.60 (L) 05/12/2025    HGB 8.1 (L) 05/12/2025    HCT 28.3 (L) 05/12/2025     05/12/2025    RDW 17.4 (H) 05/12/2025    NEUTROABS 5.88 05/12/2025    SODIUM 140 05/12/2025    K 4.1 05/12/2025     05/12/2025    CO2 23 05/12/2025    BUN 11 05/12/2025    CREATININE 0.83 05/12/2025    GLUC 113 05/12/2025    CALCIUM 7.3 (L) 05/12/2025    AST 27 05/12/2025    ALT 27 05/12/2025    ALKPHOS 66 05/12/2025    TP 4.8 (L) 05/12/2025    ALB 2.8 (L) 05/12/2025    TBILI 0.27 05/12/2025    CHOLESTEROL 117 02/03/2025    HDL 66 02/03/2025    TRIG 82 02/03/2025    LDLCALC 35 02/03/2025    NONHDLC 51 02/03/2025    [id number] 1.609 02/03/2025    FREET4 1.07 03/07/2017    PREGUR Negative 10/03/2018    HCGQUANT <2 03/07/2017    HGBA1C 6.7 (H) 05/11/2025     05/11/2025       Imaging Studies: DXA BONE DENSITY SCAN STANDARD 2 SITES  Result Date: 5/5/2025  Narrative: A DXA bone mineral density examination was performed with a Hologic scanner. History: Osteoporosis, unspecified osteoporosis type, unspecified pathological fracture presence [M81.0 (ICD-10-CM)], post menopause. Comparison studies: None Findings: The lumbar spine was examined at L1 and L3 The bone density of the lumbar spine is 0.971 g/cm2 T-score lumbar spine: -0.4 This is within the normal range Right hip The bone density of the femoral neck is 0.585 g/cm2 T-score femoral neck: -2.4 This is 69% of normal. The bone density of the total hip is 0.735 g/cm2 T-score total hip: -1.7 This is 78% of normal.    Impression: Impression: The examination is reviewed using the World Health Organization criteria. 1. The lumbar spine has bone mineral density that is in the normal range. 2. The femoral neck and hip total have bone mineral density of osteopenia with measurements that show increased risk for fracture. WHO Fracture Risk Assessment Tool (FRAX) estimates 10 year fracture risk as follows: * Major osteoporotic fracture risk without prior  fracture is 8.6%. * Major osteoporotic fracture risk with prior fracture is 15%. * Hip fracture risk without prior fracture is 1.3%. * Hip fracture risk with prior fracture is 2.6%. The National Osteoporosis Foundation recommends that FDA approved medical therapies be considered in postmenopausal women and men age greater than or equal to 50 years with the followin.  Hip or vertebral fracture; 2.  T score of less than or equal to -2.5 at the spine or hip; 3.  Osteopenia and 10 year fracture probability by FRAX of greater than or equal to 20% for major osteoporotic fracture or equal to 3% for hip fracture. Comment: All treatment decisions, including pharmacologic treatment, require clinical judgment and consideration of individual patient factors, including patient preferences, comorbidities, previous drug use and risk factors not captured in the FRAX model, e.g. fragility, falls, vitamin D deficiency, increased bone turnover, and interval significant decline in bone mineral density. Workstation:[id number]    EKG: HIn=405 on 2025    Code Status: Level 1 - Full Code  Advance Directive and Living Will:       Power of :      Suicide/Homicide Risk Assessment:  Risk of Harm to Self:  The following ratings are based on assessment at the time of the interview and review of records  Demographic risk factors include: , , age: over 50 or older  Historical Risk Factors include: history of depression, history of anxiety, history of suicide attempt  Recent Specific Risk Factors include: mental illness diagnosis, current depressive symptoms, current anxiety symptoms, recent suicide attempt, hopelessness, health problems, unemployed  Protective Factors: no current suicidal ideation, being a parent, compliant with medications  Weapons: none. The following steps have been taken to ensure weapons are properly secured: not applicable  Based on today's assessment, Marva presents the following risk of  harm to self:  elevated    Risk of Harm to Others:  The following ratings are based on assessment at the time of the interview and review of records  Demographic Risk Factors include: none.  Historical Risk Factors include: none.  Recent Specific Risk Factors include: social difficulties.  Protective Factors: no current homicidal ideation, being a parent  Weapons: none. The following steps have been taken to ensure weapons are properly secured: not applicable  Based on today's assessment, Marva presents the following risk of harm to others: minimal        Eulalia Nayak DO 05/12/25  Psychiatry Resident, PGY-2    This note was completed in part utilizing voice recognizing software. Grammatical, translation, syntax errors, random word insertions, spelling mistakes, and incomplete sentences may be an occasional consequence of this system secondary to software limitations with voice recognition, ambient noise, and hardware issues. If you have any questions or concerns about the content, text, or information contained within the body of this dictation, please contact the provider for clarification.

## 2025-05-12 NOTE — ASSESSMENT & PLAN NOTE
Lab Results   Component Value Date    HGBA1C 6.7 (H) 05/11/2025       Recent Labs     05/11/25  2100 05/12/25  0011 05/12/25  0055 05/12/25  0553   POCGLU 288* 150* 134 111       Blood Sugar Average: Last 72 hrs:  (P) 170.75

## 2025-05-12 NOTE — ASSESSMENT & PLAN NOTE
Assessment:    Marva Salgado is a 54 y.o. female, , with past psychiatric history of depression and anxiety, and past medical history of type 2 diabetes melitis, CHIDI, adrenal insufficiency, asthma, chronic pain, migraines who was admitted to [facility] ICU on 5/11/2025 due to intentional overdose. Psychiatric consultation was requested for management of intentional overdose.  On evaluation, patient is calm, cooperative, guarded, minimizing events.  After speak with patient's ex- and close friend, there appears to be text messages saying that she took a lot of pills and made goodbye statements.  Although patient adamantly denies that this was a suicide attempt, she does appear to be minimizing.  She also has a similar past attempt around 10 years ago.  At this time, patient is presently adhering to criteria for inpatient psychiatric admission.     Principal Psychiatric Problem:  Unspecified mood disorder (HCC)  Differential includes, but is not limited to: MDD v. Adjustment disorder    Plan:   Discussed with primary team, with the following recommendations:    Admission labs reviewed.  At this time, patient is not agreeable to 201 voluntary commitment.  Will reevaluate once patient is medically cleared.  Patient is currently not safe for discharge. If patient wishes to leave, a 302 process should be initiated for involuntary psychiatric treatment.  Recommend the following changes in psychiatric medication at this time:  Restart Cymbalta 90 mg daily for mood, chronic pain, and anxiety  Observation level: 1:1 observation for patient safety  Collaborate with collaterals for baseline assessment and disposition as indicated  Psychiatry will continue to follow as needed. Please contact our service via [employer] Chat with any additional questions or concerns. If contacting after hours, please call or Epic Chat the on-call team (BASSEM: [phone]) with any questions or concerns.    Risks, benefits and  possible side effects of Medications: No new medications at this time.

## 2025-05-12 NOTE — ASSESSMENT & PLAN NOTE
"Reportedly patient texted her  that she let the dogs out then said \"Bye\".  When he returned to the home, he found her minimally responsive with poor respiratory effort and started CPR.  On arrival of EMS, CPR was continued for short period of time when spontaneous respirations and palpable pulse were noted.  On arrival to ER she was noted to be somnolent and required 2L NC.  Capnography was started in ER  UDS + hydrocodone  Coma panel: EtOH < 10, Salicylate < 5, Tylenol 23  Family is unsure what all she could have possibly taken.  Patient only admits to taking 2 tylenol tabs  Home meds include gabapentin, cymbalta, Klonopin, Robaxin, Nurtec, trazodone  ER contact poison control   NAC started in ER     Plan:  Admit to ICU  Continue NAC  Consult toxicology, appreciate recommendations  Supportive care  Continue IVF  Check EKG  Q1h neuro checks  Low threshold to intubate  1:1 sitter for SI/SA precautions  Consult psychiatry, appreciate recommendations  "

## 2025-05-12 NOTE — ASSESSMENT & PLAN NOTE
UA with + nitrites, moderate bacteria, 4-10 WBC  Family states that patient was complaining of frequency and dysuria  Received Ceftriaxone in ER    Plan:  Now with SIRS and BiPAP requirements  Obtain blood cultures, lactic, procal  Change ceftriaxone to cefepime given tylenol OD and potential for hepatotoxicity  See sepsis

## 2025-05-12 NOTE — QUICK NOTE
Critical Care Interval Transfer Note:    Brief Hospital Summary: 54-year-old female with history of migraine, chronic pain, CHIDI, iron deficiency anemia. She took approximately 8 mg of Dilaudid and an unknown amount of hydromorphone yesterday evening. Patient denies suicidal intent, however per family it was intentional. She was extremely lethargic at presentation, had respiratory acidosis, requiring BiPAP overnight. This morning she is alert and conversational on room air. Currently she is on ceftriaxone due to meeting SIRS criteria with positive UA possible retrocardiac infiltrate on chest x-ray. She is also on a one-to-one.    Initially, it was unknown when patient had taken.  Tylenol level was 23, so NAC was started.  Toxicology recommending NAC to be discontinued due to low levels suspicion for Tylenol overdose. She is on a migraine cocktail d/t c/o migraine.    She endorses congestion, headache, and dysuria. No SOB or CP    Barriers to discharge:   Psychiatry consultation  Possible harm to self  SIRS, UTI versus pneumonia  Migraine treatment     Consults: IP CONSULT TO CASE MANAGEMENT  IP CONSULT TO PSYCHIATRY  IP CONSULT TO TOXICOLOGY  IP CONSULT TO PHARMACY    Recommended to review admission imaging for incidental findings and document in discharge navigator: Chart reviewed, no known incidental findings noted at this time.      Discharge Plan: Anticipate discharge tomorrow to discharge location to be determined pending rehab evaluations.       Patient seen and evaluated by Critical Care today and deemed to be appropriate for transfer to Med Surg. Spoke to Dr. Alvarez from [facility] to accept transfer. Critical care can be contacted via SecureChat with any questions or concerns. Please use the Critical Care AP Role in Secure Chat for any provider inquires until the patient is transferred out of the ICU or until tomorrow at 0600.

## 2025-05-12 NOTE — SEPSIS NOTE
"  Sepsis Note   Marva Salgado 54 y.o. female MRN: [id number]  Unit/Bed#: ICU 10 Encounter: [id number]       Initial Sepsis Screening       Row Name 05/12/25 0045                Is the patient's history suggestive of a new or worsening infection? Yes (Proceed)  -NG        Suspected source of infection urinary tract infection  -NG        Indicate SIRS criteria Tachycardia > 90 bpm;Tachypnea > 20 resp per min  -NG        Are two or more of the above signs & symptoms of infection both present and new to the patient? Yes (Proceed)  -NG        Assess for evidence of organ dysfunction: Are any of the below criteria present within 6 hours of suspected infection and SIRS criteria that are NOT considered to be chronic conditions? New need for invasive/non-invasive ventilation  -NG        Date of presentation of severe sepsis 05/12/25  -NG        Time of presentation of severe sepsis 0047  -NG        Sepsis Note: Click \"NEXT\" below (NOT \"close\") to generate sepsis note based on above information. YES (proceed by clicking \"NEXT\")  -NG                  User Key  (r) = Recorded By, (t) = Taken By, (c) = Cosigned By      Initials Name Provider Type    NG ELISA Mckinnon Nurse Practitioner                  Patient received dose of ceftriaxone in ER at 23:08.  Isolyte 1L bolus started 00:18.        Body mass index is 31.74 kg/m².  Wt Readings from Last 1 Encounters:   05/11/25 89.2 kg (196 lb 10.4 oz)        Ideal body weight: 59.3 kg (130 lb 11.7 oz)  Adjusted ideal body weight: 71.3 kg (157 lb 1.6 oz)    "

## 2025-05-12 NOTE — ASSESSMENT & PLAN NOTE
AEB: tachycardia, tachypnea, new need for BiPAP  Suspected Source: urinary  UA/micro: WBC 4-10, moderate bacteria, +nitrites  CXR: possible LLL infiltrate vs atelectasis    Plan:  Sepsis workup  Check blood cultures x2, check lactic, check procal  Start cefepime -- given tylenol toxicity and concern for potential hepatotoxicity  Start vanco -- general pharmacy consult for dosing  Check MRSA swab  Follow cultures, de-escalate antibiotics as able  Maintain MAP > 65 mmHg

## 2025-05-12 NOTE — UTILIZATION REVIEW
NOTIFICATION OF INPATIENT ADMISSION   AUTHORIZATION REQUEST   SERVICING FACILITY:   [place] [address]  Tax ID: [id number]  NPI: [id number]   ATTENDING PROVIDER:  Attending Name and NPI#: Andrade Pitt Md [[id number]]  Address: [address] [place] [address]  Phone: [phone]     ADMISSION INFORMATION:  Place of Service: Inpatient [facility]  Place of Service Code: 21  Inpatient Admission Date/Time: 5/11/25 11:15 PM  Discharge Date/Time: No discharge date for patient encounter.  Admitting Diagnosis Code/Description:  Intentional drug overdose, initial encounter (HCC) [T50.902A]  Intentional overdose (HCC) [T50.902A]     UTILIZATION REVIEW CONTACT:  Latanya Martins Utilization   Network Utilization Review Department  Phone: [phone]  Fax: [phone]  Email: [email or web address]  Contact for approvals/pending authorizations, clinical reviews, and discharge.     PHYSICIAN ADVISORY SERVICES:  Medical Necessity Denial & Yypg-kd-Elow Review  Phone: [phone]  Fax: [phone]  Email: [email or web address]     DISCHARGE SUPPORT TEAM:  For Patients Discharge Needs & Updates  Phone: [phone] opt. 2 Fax: [phone]  Email: [email or web address]

## 2025-05-12 NOTE — CONSULTS
Vancomycin IV Pharmacy-to-Dose Consultation    Marva Salgado is a 54 y.o. female who was receiving Vancomycin IV with management by the Pharmacy Consult service for treatment of Pneumonia (goal -600, trough >10)  .       The patient’s Vancomycin therapy has been discontinued. Thank you for allowing us to take part in this patient's care. Pharmacy will sign-off now; please call or re-consult if there are any questions.      Liz Starr, PharmD  Critical Care & Internal Medicine Clinical Pharmacist   Available via Epic Secure Chat

## 2025-05-12 NOTE — H&P
"H&P - Critical Care/ICU   Name: Marva Salgado 54 y.o. female I MRN: [id number]  Unit/Bed#: ICU 10 I Date of Admission: 5/11/2025   Date of Service: 5/12/2025 I Hospital Day: 1       Assessment & Plan  Intentional overdose (HCC)  Reportedly patient texted her  that she let the dogs out then said \"Bye\".  When he returned to the home, he found her minimally responsive with poor respiratory effort and started CPR.  On arrival of EMS, CPR was continued for short period of time when spontaneous respirations and palpable pulse were noted.  On arrival to ER she was noted to be somnolent and required 2L NC.  Capnography was started in ER  UDS + hydrocodone  Coma panel: EtOH < 10, Salicylate < 5, Tylenol 23  Family is unsure what all she could have possibly taken.  Patient only admits to taking 2 tylenol tabs  Home meds include gabapentin, cymbalta, Klonopin, Robaxin, Nurtec, trazodone  ER contact poison control   NAC started in ER     Plan:  Admit to ICU  Continue NAC  Consult toxicology, appreciate recommendations  Supportive care  Continue IVF  Check EKG  Q1h neuro checks  Low threshold to intubate  1:1 sitter for SI/SA precautions  Consult psychiatry, appreciate recommendations  Encephalopathy, toxic  Presented somnolent.  Multifocal in the setting of overdose   UDS + hydrocodone  Tylenol level 23  VBG with mixed respiratory and metabolic acidosis  Patient has multiple other medications that can possibly cause lethargy.  Family was unsure what was possibly injested.  On exam she is rousable and answers orientation questions     Plan:  Q1h neuro checks  Low threshold to intubate if unable to protect airway  If mental status worsens will check CT head  Acute respiratory failure with hypoxia and hypercapnia (HCC)  2/2 somnolence from overdose.  In ER was placed on 2L NC  9 pm VBG 7.168, pCO2 57, pO2 63, HCO3 20, -9  Repeat VBG 7.106, pCO2 56, pO2 42, HCO3 17, -12  CXR: possible infiltrate vs atelectasis in " LLL    Plan:  Start BiPAP  Low threshold to intubate given mental status in the setting of overdose  Pulmonary toilet  Titrate FiO2 for SpO2 > 92%  Follow up VBG on BiPAP  See sepsis  Metabolic acidosis  On initial CMP CO2 22, VBG HCO3 20, now 17  Lactic < 0.2  Likely in the setting of acute overdose    Plan:  Give 1L bolus IVF  Repeat CMP on arrival to ICU  Trend VBG, BMP  Urinary tract infection  UA with + nitrites, moderate bacteria, 4-10 WBC  Family states that patient was complaining of frequency and dysuria  Received Ceftriaxone in ER    Plan:  Now with SIRS and BiPAP requirements  Obtain blood cultures, lactic, procal  Change ceftriaxone to cefepime given tylenol OD and potential for hepatotoxicity  See sepsis  Severe sepsis (HCC)  AEB: tachycardia, tachypnea, new need for BiPAP  Suspected Source: urinary  UA/micro: WBC 4-10, moderate bacteria, +nitrites  CXR: possible LLL infiltrate vs atelectasis    Plan:  Sepsis workup  Check blood cultures x2, check lactic, check procal  Start cefepime -- given tylenol toxicity and concern for potential hepatotoxicity  Start vanco -- general pharmacy consult for dosing  Check MRSA swab  Follow cultures, de-escalate antibiotics as able  Maintain MAP > 65 mmHg  Type 2 diabetes mellitus without complication, without long-term current use of insulin ([facility])  Lab Results   Component Value Date    HGBA1C 6.1 (H) 02/03/2025       Recent Labs     05/11/25  2100 05/12/25  0011 05/12/25  0055   POCGLU 288* 150* 134       Blood Sugar Average: Last 72 hrs:  (P) [id number]  PTA: family reports patient is taking ozempic  Currently hyperglycemic    Plan:  Holding PTA medications  Start accuchecks q6h with SSI  Goal blood glucose 140-180  Avoid hypoglycemia  NPO for now 2/2 mental status  Will need CHO controlled diet when able to take po   Obesity, Class II, BMI 35-39.9  Currently NPO  H/o gastric bypass  Obstructive sleep apnea syndrome  Non-compliant with  "CPAP    Plan:  Currently on BiPAP for acute hypoxic and hypercarbic respiratory failure   H/O gastric bypass  PTA: iron and vitamin B supplementation  Currently NPO    Plan:  Holding PTA medications  Chronic pain  PTA: gabapentin, robaxin    Plan:  Hold for now due to overdose/mental status  h/o Adrenal insufficiency (HCC)  PTA: dexamethasone 0.5 mg daily    Plan:  Hold for now while NPO  If mental status improves will restart.  If unable to take po will need to convert to IV  Depression  PTA: Cymbalta    Plan:  Hold PTA meds in the setting of overdose/mental status  Disposition: Stepdown Level 1    History of Present Illness   Marva Salgado is a 54 y.o. who presents with intentional overdose.  She has PMHx remote PE, CHIDI with CPAP non-compliance, [place], T2DM, depression, adrenal insufficiency, remote gastric bypass, migraines, chronic pain, HLD, anemia.  Reportedly patient texted her  that she let the dogs out then said \"Bye\".  When he returned to the home, he found her minimally responsive with poor respiratory effort and started CPR.  On arrival of EMS, CPR was continued for short period of time when spontaneous respirations and palpable pulse were noted.  On arrival to ER she was noted to be somnolent and required 2L NC.  On exam she is somnolent but rousable.  She is able to answer orientation questions.  She denies attempting self-harm, and states that she has had a migraine and only took 2 tylenol tabs.     History obtained from spouse and child, chart review, and unobtainable from patient due to mental status.  Review of Systems: Review of Systems not obtainable due to Altered mental status, acute encephalopathy    Historical Information   Past Medical History:  No date: Arthritis  No date: Asthma  No date: CPAP (continuous positive airway pressure) dependence  No date: Depression  No date: Diabetes mellitus (HCC)  No date: Gait disturbance      Comment:  uses w/c for long distance  No date: " Gastritis  No date: Hiatal hernia  No date: Hx of fusion of cervical spine  No date: Hypertension  No date: Migraines  No date: Morbid obesity (HCC)  No date: PONV (postoperative nausea and vomiting)      Comment:  difficulty awakening- too much CO2  No date: Pulmonary emboli (HCC)      Comment:  hx blood clot also in neck  No date: S/P insertion of spinal cord stimulator  No date: Sleep apnea  No date: Tachycardia  No date: Wears contact lenses Past Surgical History:  No date: COLONOSCOPY  No date: DISCECTOMY SPINE CERVICAL POSTERIOR  No date: HYSTERECTOMY      Comment:  and bladder sling  No date: JOINT REPLACEMENT      Comment:  left TKR and partial right  No date: NECK SURGERY      Comment:  cervical fusion  11/30/2021: TN LAPS GSTR RSTCV PX W/BYP GWYN-EN-Y LIMB <150 CM; N/A      Comment:  Procedure: ROBOTIC BYPASS GASTRIC GWYN-EN-Y,INTRAOP EGD,               PARAESOPHAGEAL HERNIA REPAIR W/ MESH;  Surgeon: Gabriel Gutierrez MD;  Location: [facility];  Service: Bariatrics  No date: SHOULDER OPEN ROTATOR CUFF REPAIR  No date: SPINAL CORD STIMULATOR IMPLANT   Current Outpatient Medications   Medication Instructions    albuterol (PROVENTIL HFA,VENTOLIN HFA) 90 mcg/act inhaler 2 puffs, Inhalation, Every 4 hours PRN    atorvastatin (LIPITOR) 20 mg, Daily    cyanocobalamin 1,000 mcg, Every 30 days    DULoxetine (CYMBALTA) 60 mg, Oral, Every evening    eletriptan (RELPAX) 40 mg, Once as needed    EPINEPHrine (EPIPEN) 0.3 mg/0.3 mL SOAJ No dose, route, or frequency recorded.    ferrous gluconate (FERGON) 324 mg, Daily with breakfast    gabapentin (NEURONTIN) 1,200 mg, Oral, Daily at bedtime    gabapentin (NEURONTIN) 300 mg, Oral, Daily, In am    HYDROcodone-acetaminophen (HYCET) 7.5-325 MG/15ML solution 7.5 mL, Oral, Every 6 hours PRN    methocarbamol (ROBAXIN) 500 mg, 3 times daily    ofloxacin (FLOXIN) 0.3 % otic solution 5 drops, Otic, 2 times daily    omeprazole (PriLOSEC) 20 mg delayed release capsule  TAKE 1 CAPSULE DAILY    Allergies   Allergen Reactions    Iodine - Food Allergy Anaphylaxis           Lyrica [Pregabalin] Other (See Comments)     Suicidal ideations    Shellfish Allergy - Food Allergy Anaphylaxis    Wellbutrin [Bupropion] Hives     Shaky and dizzy    Wound Dressing Adhesive Hives     Including tegaderm dressing, paper tape ok but can react to it     Latex Hives    Percocet [Oxycodone-Acetaminophen] Itching      Social History     Tobacco Use    Smoking status: Never    Smokeless tobacco: Never   Vaping Use    Vaping status: Never Used   Substance Use Topics    Alcohol use: Yes     Comment: few x year    Drug use: No    Family History   Problem Relation Age of Onset    Arthritis Mother     Asthma Mother     Depression Mother     Heart disease Father     Diabetes Father     Diabetes Sister     Heart disease Paternal Uncle     Heart disease Paternal Grandfather           Objective :                   Vitals I/O      Most Recent Min/Max in 24hrs   Temp 98.4 °F (36.9 °C) Temp  Min: 97.4 °F (36.3 °C)  Max: 98.4 °F (36.9 °C)   Pulse (!) 108 Pulse  Min: 96  Max: 108   Resp 12 Resp  Min: 12  Max: 24   /74 BP  Min: 121/76  Max: 158/74   O2 Sat 100 % SpO2  Min: 98 %  Max: 100 %      Intake/Output Summary (Last 24 hours) at 5/12/2025 0149  Last data filed at 5/11/2025 2340  Gross per 24 hour   Intake 1316.9 ml   Output --   Net 1316.9 ml       Diet NPO    Invasive Monitoring           Physical Exam   Physical Exam  Vitals and nursing note reviewed.   Eyes:      General: No scleral icterus.        Right eye: No discharge.         Left eye: No discharge.      Extraocular Movements: Extraocular movements intact.      Conjunctiva/sclera: Conjunctivae normal.      Pupils: Pupils are equal, round, and reactive to light.   Skin:     General: Skin is warm, dry and not mottled extremities.      Capillary Refill: Capillary refill takes less than 2 seconds.      Coloration: Skin is pale. Skin is not jaundiced.       Findings: No lesion, rash or wound.   HENT:      Head: Normocephalic and atraumatic.      Right Ear: Hearing normal. No drainage.      Left Ear: Hearing normal. No drainage.      Nose: No nasal deformity, nasal tenderness, congestion, rhinorrhea, epistaxis or nasogastric tube.      Mouth/Throat:      Mouth: Mucous membranes are dry. No injury or angioedema.      Dentition: Normal dentition.      Pharynx: No oropharyngeal exudate.   Neck:      Vascular: No JVD.   no midline tenderness Cardiovascular:      Rate and Rhythm: Normal rate and regular rhythm.      Pulses: No decreased pulses.      Heart sounds: No murmur heard.     No friction rub. No gallop.   Musculoskeletal:         General: No swelling, tenderness, deformity or signs of injury. Normal range of motion.      Right lower leg: No edema.      Left lower leg: No edema.   Abdominal: General: Bowel sounds are normal. There is no distension.      Palpations: Abdomen is soft.      Tenderness: There is no abdominal tenderness. There is no guarding.      Hernia: No hernia is present.   Constitutional:       General: She is not in acute distress.     Appearance: She is well-developed and well-nourished. She is ill-appearing. She is not toxic-appearing.      Interventions: She is not sedated, not intubated and not restrained.  Pulmonary:      Effort: Pulmonary effort is normal. No accessory muscle usage, respiratory distress or accessory muscle usage. She is not intubated.      Breath sounds: No wheezing, rhonchi or rales.   Neurological:      Mental Status: She is lethargic.      GCS: GCS eye subscore is 2. GCS verbal subscore is 5. GCS motor subscore is 6.      Cranial Nerves: Cranial nerves 2-12 are intact. No cranial nerve deficit, dysarthria or facial asymmetry.          Diagnostic Studies        Lab Results: I have reviewed the following results:     Medications:  Scheduled PRN   acetylcysteine, 50 mg/kg, Once  acetylcysteine, 100 mg/kg, Once  cefepime, 2,000  mg, Q12H  chlorhexidine, 15 mL, Q12H JANNIE  heparin (porcine), 5,000 Units, Q8H JANNIE  insulin lispro, 1-6 Units, Q6H JANNIE  levalbuterol, 1.25 mg, TID  vancomycin, 15 mg/kg, Q12H  vancomycin, 25 mg/kg (Adjusted), Once      albuterol, 2 puff, Q4H PRN       Continuous    multi-electrolyte, 75 mL/hr, Last Rate: 75 mL/hr (05/12/25 0058)         Labs:   CBC    Recent Labs     05/11/25 2105   WBC 10.07   HGB 10.4*   HCT 36.9        BMP    Recent Labs     05/11/25 2105   SODIUM 140   K 4.1      CO2 22   AGAP 10   BUN 13   CREATININE 1.15   CALCIUM 8.1*       Coags    No recent results     Additional Electrolytes  No recent results       Blood Gas    No recent results  Recent Labs     05/11/25  2348   PHVEN 7.106*   [id number] 55.7*   PO2VEN 42.3   DGG4IJC 17.1*   BEVEN -12.1   [id number] 70.3    LFTs  Recent Labs     05/11/25 2105 05/12/25  0043   ALT 41 36   AST 53* 38   ALKPHOS 116* 85   ALB 3.9 3.5   TBILI 0.38 0.25       Infectious  No recent results  Glucose  Recent Labs     05/11/25 2105   GLUC 286*        Administrative Statements   Critical Care Time Statement: Upon my evaluation, this patient had a high probability of imminent or life-threatening deterioration due to intentional overdose, acute toxic encephalopathy, acute hypoxic and hypercarbic respiratory failure, which required my direct attention, intervention, and personal management.  I spent a total of 45 minutes directly providing critical care services, including interpretation of complex medical databases, evaluating for the presence of life-threatening injuries or illnesses, management of organ system failure(s) , complex medical decision making (to support/prevent further life-threatening deterioration)., interpretation of hemodynamic data, and titration of continuous IV medications (drips). This time is exclusive of procedures, teaching, family meetings, and any prior time recorded by providers other than myself.

## 2025-05-12 NOTE — CONSULTS
Consultation - Medical Toxicology   Name: Marva Salgado 54 y.o. female I MRN: [id number]  Unit/Bed#: ICU 10 I Date of Admission: 5/11/2025   Date of Service: 5/12/2025 I Hospital Day: 1       Inpatient consult to Toxicology     Date/Time  5/12/2025 9:30 AM     Performed by  Mary Carmen Ewing MD   Authorized by  ELISA Mckinnon           Physician Requesting Evaluation: Andrade Pitt,*   Reason for Evaluation / Principal Problem: Intentional overdose    Assessment & Plan  Intentional overdose (HCC)  Ingestion of 8mg dilaudid, 1 dose of Hycet (liquid hydrocodone/Tylenol), 4 Tylenol PM (includes benadryl) and her home meds which includes cymbalta 60mg, gabapentin 1,200mg and robaxcin 500mg  Narcan given pre-hospital with minimal response likely 2/2 poor inhalation  Positive UDS for hydrocodone   Tylenol of 23, repeat of 3  Started on NAC in the ED for elevated AST 53, AST now 34   Overall, currently with benign exam, normal labs, no evidence of toxidrome on exam and normal VS.      Recommendations  Discontinue NAC  Provided there are no additional medical concerns, the patient may be cleared from a toxicological standpoint by the primary provider at the end of the recommended observation period, if the patient is asymptomatic, with normal mental status and vital signs, and otherwise normal exam. Please call medical  on call immediately to discuss any changes in clinical course or laboratory abnormalities.     I have discussed the above management plan in detail with the primary service.         For further questions, please contact the medical  on call via SecureChat between 8am and 9pm. If between 9pm and 8am, please reach out to the [facility] at [phone].     History of Present Illness   Marva Salgado is a 54 y.o. year old female who presented to [facility] ED via EMS for intentional overdose. Per chart review, patient got into an argument with ex- and texted  him goodbye. She was found by ex- shortly after obtunded. He believed she had no pulse and began CPR. EMS arrived and patient had pulse, so CPR was stopped. She was given 4mg Narcan intranasally without improvement of lethargy. Patient then brought to ED. In the ED, patient was GSC of 14 and was protecting airway. Had to wear oxygen while in the ED, but weaned off while in the ICU and more alert.   Patient says she feels well now. She states that she took 8mg dilaudid, 1 hydrocodone, 4 Tylenol PM (includes benadryl) and her home meds which includes cymbalta, gabapentin  and robaxcin. Patient states she took these to try and stop the migraine that had been present for 2 weeks.     Review of Systems   Constitutional:  Negative for chills and fever.   HENT:  Negative for congestion and rhinorrhea.    Eyes:  Negative for visual disturbance.   Respiratory:  Negative for cough and shortness of breath.    Cardiovascular:  Negative for chest pain.   Gastrointestinal:  Negative for abdominal pain, nausea and vomiting.   Neurological:  Positive for headaches. Negative for dizziness and light-headedness.       Historical Information   Medical History Review: I have reviewed the patient's PMH, PSH, Social History, Family History, Meds, and Allergies   Social History     Tobacco Use    Smoking status: Never    Smokeless tobacco: Never   Vaping Use    Vaping status: Never Used   Substance and Sexual Activity    Alcohol use: Yes     Comment: few x year    Drug use: No    Sexual activity: Not on file     Family History   Problem Relation Age of Onset    Arthritis Mother     Asthma Mother     Depression Mother     Heart disease Father     Diabetes Father     Diabetes Sister     Heart disease Paternal Uncle     Heart disease Paternal Grandfather        Meds/Allergies   Prior to Admission medications    Medication Sig Start Date End Date Taking? Authorizing Provider   atorvastatin (LIPITOR) 20 mg tablet Take 20 mg by mouth  daily 1/10/25  Yes Historical Provider, MD   cyanocobalamin 1,000 mcg/mL Inject 1,000 mcg under the skin every 30 (thirty) days 4/27/25  Yes Historical Provider, MD   eletriptan (RELPAX) 40 MG tablet Take 40 mg by mouth once as needed for migraine 3/19/25  Yes Historical Provider, MD   ferrous gluconate (FERGON) 324 mg tablet Take 324 mg by mouth daily with breakfast 4/25/25  Yes Historical Provider, MD   methocarbamol (ROBAXIN) 500 mg tablet Take 500 mg by mouth 3 (three) times a day 5/3/25  Yes Historical Provider, MD   albuterol (PROVENTIL HFA,VENTOLIN HFA) 90 mcg/act inhaler Inhale 2 puffs every 4 (four) hours as needed   7/8/20   Historical Provider, MD   DULoxetine (CYMBALTA) 60 mg delayed release capsule Take 60 mg by mouth every evening   7/8/20   Historical Provider, MD   EPINEPHrine (EPIPEN) 0.3 mg/0.3 mL SOAJ  1/3/19   Historical Provider, MD   gabapentin (NEURONTIN) 300 mg capsule Take 300 mg by mouth daily In am  7/17/20   Historical Provider, MD   gabapentin (NEURONTIN) 600 MG tablet Take 1,200 mg by mouth daily at bedtime   7/17/20   Historical Provider, MD   HYDROcodone-acetaminophen (HYCET) 7.5-325 MG/15ML solution Take 7.5 mL by mouth every 6 (six) hours as needed for moderate pain for up to 10 doses Max Daily Amount: 30 mL 11/24/21   Kacy Poe DO   ofloxacin (FLOXIN) 0.3 % otic solution Administer 5 drops into ears 2 (two) times a day 12/27/23   Padilla Hayden MD   omeprazole (PriLOSEC) 20 mg delayed release capsule TAKE 1 CAPSULE DAILY 3/23/22   Aggie Curtis MD     Current Facility-Administered Medications:     acetylcysteine (ACETADOTE) 8,920 mg in dextrose 5 % 1,000 mL IVPB, 100 mg/kg, Intravenous, Once, ELISA Mckinnon, Last Rate: 65.3 mL/hr at 05/12/25 0443, 8,920 mg at 05/12/25 0443    albuterol (PROVENTIL HFA,VENTOLIN HFA) inhaler 2 puff, 2 puff, Inhalation, Q4H PRN, ELISA Mckinnon    calcium gluconate 2 g in sodium chloride 0.9% 100 mL (premix), 2 g,  Intravenous, Once, ELISA Cardenas, Last Rate: 100 mL/hr at 05/12/25 0843, 2 g at 05/12/25 0843    ceFEPime (MAXIPIME) 2,000 mg in dextrose 5 % 50 mL IVPB, 2,000 mg, Intravenous, Q12H, ELISA Mckinnon    chlorhexidine (PERIDEX) 0.12 % oral rinse 15 mL, 15 mL, Mouth/Throat, Q12H JANNIE, ELISA Mckinnon, 15 mL at 05/12/25 0814    ferrous sulfate tablet 325 mg, 325 mg, Oral, BID With Meals, Amy Casas MD, 325 mg at 05/12/25 0814    heparin (porcine) subcutaneous injection 5,000 Units, 5,000 Units, Subcutaneous, Q8H JANNIE, ELISA Mckinnon, 5,000 Units at 05/12/25 0621    insulin lispro (HumALOG/ADMELOG) 100 units/mL subcutaneous injection 1-6 Units, 1-6 Units, Subcutaneous, Q6H JANNIE **AND** Fingerstick Glucose (POCT), , , Q6H, ELISA Mckinnon    levalbuterol (XOPENEX) inhalation solution 1.25 mg, 1.25 mg, Nebulization, TID, ELISA Mckinnon, 1.25 mg at 05/12/25 0721    magnesium sulfate IVPB (premix) SOLN 1 g, 1 g, Intravenous, Once **AND** [COMPLETED] magnesium sulfate 2 g/50 mL IVPB (premix) 2 g, 2 g, Intravenous, Once, ELISA Mckinnon, Stopped at 05/12/25 0908    multi-electrolyte (Plasmalyte-A/Isolyte-S PH 7.4/Normosol-R) IV solution, 75 mL/hr, Intravenous, Continuous, ELISA Mckinnon, Last Rate: 75 mL/hr at 05/12/25 0058, 75 mL/hr at 05/12/25 0058    vancomycin (VANCOCIN) IVPB (premix in dextrose) 1,000 mg 200 mL, 1,000 mg, Intravenous, Q12H, Zachary Mercer MD   Allergies   Allergen Reactions    Iodine - Food Allergy Anaphylaxis           Lyrica [Pregabalin] Other (See Comments)     Suicidal ideations    Shellfish Allergy - Food Allergy Anaphylaxis    Wellbutrin [Bupropion] Hives     Shaky and dizzy    Wound Dressing Adhesive Hives     Including tegaderm dressing, paper tape ok but can react to it     Latex Hives    Percocet [Oxycodone-Acetaminophen] Itching       Objective :  Temp:  [97.4 °F (36.3 °C)-98.4 °F (36.9 °C)] 98.4 °F (36.9  °C)  HR:  [] 99  BP: (114-176)/(60-89) 131/74  Resp:  [11-32] 20  SpO2:  [96 %-100 %] 96 %  O2 Device: None (Room air)      Intake/Output Summary (Last 24 hours) at 5/12/2025 0929  Last data filed at 5/12/2025 0800  Gross per 24 hour   Intake 4214.43 ml   Output 300 ml   Net 3914.43 ml       Physical Exam  Constitutional:       General: She is not in acute distress.  HENT:      Head: Normocephalic and atraumatic.      Mouth/Throat:      Mouth: Mucous membranes are moist.      Pharynx: Oropharynx is clear.   Eyes:      Extraocular Movements: Extraocular movements intact.      Pupils: Pupils are equal, round, and reactive to light.      Comments: 3mm and reactive bilaterally   Cardiovascular:      Rate and Rhythm: Normal rate and regular rhythm.      Pulses: Normal pulses.      Heart sounds: Normal heart sounds.   Pulmonary:      Effort: Pulmonary effort is normal. No respiratory distress.   Abdominal:      Palpations: Abdomen is soft.      Tenderness: There is no abdominal tenderness.   Musculoskeletal:         General: No deformity. Normal range of motion.      Cervical back: Normal range of motion. No tenderness.   Skin:     General: Skin is warm and dry.      Capillary Refill: Capillary refill takes less than 2 seconds.   Neurological:      General: No focal deficit present.      Mental Status: She is alert and oriented to person, place, and time. Mental status is at baseline.      Comments: 1-2 beats of clonus bilateral LE           Lab Results: I have reviewed the following results:  Results from last 7 days   Lab Units 05/12/25  0449 05/11/25  2105   WBC Thousand/uL 9.00 10.07   HEMOGLOBIN g/dL 8.1* 10.4*   HEMATOCRIT % 28.3* 36.9   PLATELETS Thousands/uL 290 380   SEGS PCT % 65 65   LYMPHO PCT % 25 27   MONO PCT % 9 6   EOS PCT % 1 1      Results from last 7 days   Lab Units 05/12/25  0538 05/12/25  0449   POTASSIUM mmol/L 4.1 3.6   CHLORIDE mmol/L 108 100   CO2 mmol/L 23 21   BUN mg/dL 11 10    CREATININE mg/dL 0.83 0.77   CALCIUM mg/dL 7.3* 6.4*   ALBUMIN g/dL  --  2.8*   ALK PHOS U/L  --  66   ALT U/L  --  27   AST U/L  --  27   MAGNESIUM mg/dL 1.8* 1.6*   PHOSPHORUS mg/dL  --  2.9          Results from last 7 days   Lab Units 05/12/25  0130 05/11/25  2305   LACTIC ACID mmol/L 0.9 <0.2*     Results from last 7 days   Lab Units 05/12/25  0259 05/12/25  0043   HS TNI 0HR ng/L  --  16   HS TNI 2HR ng/L 22  --       Results from last 7 days   Lab Units 05/12/25  0449   PH RUSS  7.273*   PCO2 RUSS mm Hg 40.0*   PO2 RUSS mm Hg 42.6   HCO3 RUSS mmol/L 18.1*   O2 CONTENT RUSS ml/dL 8.8   O2 HGB, VENOUS % 74.5     Results from last 7 days   Lab Units 05/12/25  0449 05/12/25  0043 05/11/25  2105   ACETAMINOPHEN LVL ug/mL 3*   < > 23*   ETHANOL LVL mg/dL  --   --  <10   SALICYLATE LVL mg/dL  --   --  <5*    < > = values in this interval not displayed.     Imaging Results Review: I reviewed radiology reports from this admission including: chest xray.    XR chest 1 view portable  Result Date: 5/12/2025  Impression: No acute cardiopulmonary disease. Resident: Bobby Benites I, the attending radiologist, have reviewed the images and agree with the final report above. Workstation performed: [id number]     Other Study Results Review: EKG was personally reviewed and my interpretation is: NSR. HR 99, QRS 88, Qtc 467..    Administrative Statements

## 2025-05-13 PROBLEM — A41.9 SEVERE SEPSIS (HCC): Status: RESOLVED | Noted: 2025-05-12 | Resolved: 2025-05-13

## 2025-05-13 PROBLEM — R65.10 SIRS (SYSTEMIC INFLAMMATORY RESPONSE SYNDROME) (HCC): Status: ACTIVE | Noted: 2025-05-13

## 2025-05-13 PROBLEM — R65.20 SEVERE SEPSIS (HCC): Status: RESOLVED | Noted: 2025-05-12 | Resolved: 2025-05-13

## 2025-05-13 LAB
ALBUMIN SERPL BCG-MCNC: 3.5 G/DL (ref 3.5–5)
ALP SERPL-CCNC: 81 U/L (ref 34–104)
ALT SERPL W P-5'-P-CCNC: 22 U/L (ref 7–52)
ANION GAP SERPL CALCULATED.3IONS-SCNC: 6 MMOL/L (ref 4–13)
AST SERPL W P-5'-P-CCNC: 16 U/L (ref 13–39)
BASOPHILS # BLD AUTO: 0.01 THOUSANDS/ÂΜL (ref 0–0.1)
BASOPHILS NFR BLD AUTO: 0 % (ref 0–1)
BILIRUB SERPL-MCNC: 0.33 MG/DL (ref 0.2–1)
BUN SERPL-MCNC: 11 MG/DL (ref 5–25)
CALCIUM SERPL-MCNC: 8.5 MG/DL (ref 8.4–10.2)
CHLORIDE SERPL-SCNC: 109 MMOL/L (ref 96–108)
CO2 SERPL-SCNC: 23 MMOL/L (ref 21–32)
CREAT SERPL-MCNC: 0.72 MG/DL (ref 0.6–1.3)
EOSINOPHIL # BLD AUTO: 0.01 THOUSAND/ÂΜL (ref 0–0.61)
EOSINOPHIL NFR BLD AUTO: 0 % (ref 0–6)
ERYTHROCYTE [DISTWIDTH] IN BLOOD BY AUTOMATED COUNT: 17.5 % (ref 11.6–15.1)
GFR SERPL CREATININE-BSD FRML MDRD: 94 ML/MIN/1.73SQ M
GLUCOSE SERPL-MCNC: 107 MG/DL (ref 65–140)
GLUCOSE SERPL-MCNC: 128 MG/DL (ref 65–140)
GLUCOSE SERPL-MCNC: 131 MG/DL (ref 65–140)
GLUCOSE SERPL-MCNC: 179 MG/DL (ref 65–140)
HCT VFR BLD AUTO: 28.4 % (ref 34.8–46.1)
HGB BLD-MCNC: 8.4 G/DL (ref 11.5–15.4)
IMM GRANULOCYTES # BLD AUTO: 0.02 THOUSAND/UL (ref 0–0.2)
IMM GRANULOCYTES NFR BLD AUTO: 0 % (ref 0–2)
LYMPHOCYTES # BLD AUTO: 1.13 THOUSANDS/ÂΜL (ref 0.6–4.47)
LYMPHOCYTES NFR BLD AUTO: 15 % (ref 14–44)
MCH RBC QN AUTO: 23 PG (ref 26.8–34.3)
MCHC RBC AUTO-ENTMCNC: 29.6 G/DL (ref 31.4–37.4)
MCV RBC AUTO: 78 FL (ref 82–98)
MONOCYTES # BLD AUTO: 0.34 THOUSAND/ÂΜL (ref 0.17–1.22)
MONOCYTES NFR BLD AUTO: 4 % (ref 4–12)
NEUTROPHILS # BLD AUTO: 6.28 THOUSANDS/ÂΜL (ref 1.85–7.62)
NEUTS SEG NFR BLD AUTO: 81 % (ref 43–75)
NRBC BLD AUTO-RTO: 0 /100 WBCS
PLATELET # BLD AUTO: 297 THOUSANDS/UL (ref 149–390)
PMV BLD AUTO: 9.1 FL (ref 8.9–12.7)
POTASSIUM SERPL-SCNC: 3.9 MMOL/L (ref 3.5–5.3)
PROCALCITONIN SERPL-MCNC: 0.2 NG/ML
PROT SERPL-MCNC: 5.5 G/DL (ref 6.4–8.4)
RBC # BLD AUTO: 3.65 MILLION/UL (ref 3.81–5.12)
SODIUM SERPL-SCNC: 138 MMOL/L (ref 135–147)
TSH SERPL DL<=0.05 MIU/L-ACNC: 4.31 UIU/ML (ref 0.45–4.5)
WBC # BLD AUTO: 7.79 THOUSAND/UL (ref 4.31–10.16)

## 2025-05-13 PROCEDURE — 82948 REAGENT STRIP/BLOOD GLUCOSE: CPT

## 2025-05-13 PROCEDURE — 80053 COMPREHEN METABOLIC PANEL: CPT

## 2025-05-13 PROCEDURE — 97167 OT EVAL HIGH COMPLEX 60 MIN: CPT

## 2025-05-13 PROCEDURE — 99239 HOSP IP/OBS DSCHRG MGMT >30: CPT | Performed by: INTERNAL MEDICINE

## 2025-05-13 PROCEDURE — 84145 PROCALCITONIN (PCT): CPT

## 2025-05-13 PROCEDURE — 97163 PT EVAL HIGH COMPLEX 45 MIN: CPT

## 2025-05-13 PROCEDURE — 85025 COMPLETE CBC W/AUTO DIFF WBC: CPT

## 2025-05-13 PROCEDURE — RECHECK: Performed by: INTERNAL MEDICINE

## 2025-05-13 PROCEDURE — 84443 ASSAY THYROID STIM HORMONE: CPT | Performed by: INTERNAL MEDICINE

## 2025-05-13 RX ORDER — IBUPROFEN 400 MG/1
800 TABLET, FILM COATED ORAL EVERY 8 HOURS PRN
Status: CANCELLED | OUTPATIENT
Start: 2025-05-13

## 2025-05-13 RX ORDER — LORAZEPAM 2 MG/ML
2 INJECTION INTRAMUSCULAR
Status: CANCELLED | OUTPATIENT
Start: 2025-05-13

## 2025-05-13 RX ORDER — BENZTROPINE MESYLATE 1 MG/ML
1 INJECTION, SOLUTION INTRAMUSCULAR; INTRAVENOUS
Status: CANCELLED | OUTPATIENT
Start: 2025-05-13

## 2025-05-13 RX ORDER — DIPHENHYDRAMINE HCL 25 MG
25 TABLET ORAL EVERY 6 HOURS PRN
Qty: 30 TABLET | Refills: 0 | Status: SHIPPED | OUTPATIENT
Start: 2025-05-13 | End: 2025-05-19

## 2025-05-13 RX ORDER — POLYETHYLENE GLYCOL 3350 17 G/17G
17 POWDER, FOR SOLUTION ORAL DAILY PRN
Start: 2025-05-13

## 2025-05-13 RX ORDER — IBUPROFEN 600 MG/1
600 TABLET, FILM COATED ORAL EVERY 6 HOURS PRN
Status: CANCELLED | OUTPATIENT
Start: 2025-05-13

## 2025-05-13 RX ORDER — AMOXICILLIN 250 MG
1 CAPSULE ORAL DAILY PRN
Status: CANCELLED | OUTPATIENT
Start: 2025-05-13

## 2025-05-13 RX ORDER — HALOPERIDOL 1 MG/1
1 TABLET ORAL EVERY 6 HOURS PRN
Status: CANCELLED | OUTPATIENT
Start: 2025-05-13

## 2025-05-13 RX ORDER — PANTOPRAZOLE SODIUM 20 MG/1
20 TABLET, DELAYED RELEASE ORAL
Start: 2025-05-13

## 2025-05-13 RX ORDER — HALOPERIDOL 5 MG/ML
2.5 INJECTION INTRAMUSCULAR
Status: CANCELLED | OUTPATIENT
Start: 2025-05-13

## 2025-05-13 RX ORDER — PROPRANOLOL HCL 20 MG
10 TABLET ORAL EVERY 8 HOURS PRN
Status: CANCELLED | OUTPATIENT
Start: 2025-05-13

## 2025-05-13 RX ORDER — SUMATRIPTAN SUCCINATE 25 MG/1
100 TABLET ORAL ONCE AS NEEDED
Status: COMPLETED | OUTPATIENT
Start: 2025-05-13 | End: 2025-05-13

## 2025-05-13 RX ORDER — HYDROXYZINE HYDROCHLORIDE 25 MG/1
25 TABLET, FILM COATED ORAL
Status: CANCELLED | OUTPATIENT
Start: 2025-05-13

## 2025-05-13 RX ORDER — POLYETHYLENE GLYCOL 3350 17 G/17G
17 POWDER, FOR SOLUTION ORAL DAILY PRN
Status: CANCELLED | OUTPATIENT
Start: 2025-05-13

## 2025-05-13 RX ORDER — DIPHENHYDRAMINE HCL 25 MG
25 TABLET ORAL EVERY 6 HOURS PRN
Status: DISCONTINUED | OUTPATIENT
Start: 2025-05-13 | End: 2025-05-14 | Stop reason: HOSPADM

## 2025-05-13 RX ORDER — BENZTROPINE MESYLATE 1 MG/ML
0.5 INJECTION, SOLUTION INTRAMUSCULAR; INTRAVENOUS
Status: CANCELLED | OUTPATIENT
Start: 2025-05-13

## 2025-05-13 RX ORDER — MAGNESIUM HYDROXIDE/ALUMINUM HYDROXICE/SIMETHICONE 120; 1200; 1200 MG/30ML; MG/30ML; MG/30ML
30 SUSPENSION ORAL EVERY 4 HOURS PRN
Status: CANCELLED | OUTPATIENT
Start: 2025-05-13

## 2025-05-13 RX ORDER — HALOPERIDOL 5 MG/1
2.5 TABLET ORAL
Status: CANCELLED | OUTPATIENT
Start: 2025-05-13

## 2025-05-13 RX ORDER — METOCLOPRAMIDE 10 MG/1
10 TABLET ORAL 3 TIMES DAILY PRN
Start: 2025-05-13 | End: 2025-05-19

## 2025-05-13 RX ORDER — IBUPROFEN 400 MG/1
400 TABLET, FILM COATED ORAL EVERY 4 HOURS PRN
Status: CANCELLED | OUTPATIENT
Start: 2025-05-13

## 2025-05-13 RX ORDER — LORAZEPAM 2 MG/ML
2 INJECTION INTRAMUSCULAR EVERY 6 HOURS PRN
Status: CANCELLED | OUTPATIENT
Start: 2025-05-13

## 2025-05-13 RX ORDER — DIPHENHYDRAMINE HYDROCHLORIDE 50 MG/ML
50 INJECTION, SOLUTION INTRAMUSCULAR; INTRAVENOUS EVERY 6 HOURS PRN
Status: CANCELLED | OUTPATIENT
Start: 2025-05-13

## 2025-05-13 RX ORDER — TRAZODONE HYDROCHLORIDE 50 MG/1
50 TABLET ORAL
Status: CANCELLED | OUTPATIENT
Start: 2025-05-13

## 2025-05-13 RX ORDER — GABAPENTIN 300 MG/1
300 CAPSULE ORAL 3 TIMES DAILY
Start: 2025-05-13 | End: 2025-05-19

## 2025-05-13 RX ORDER — HALOPERIDOL 5 MG/ML
5 INJECTION INTRAMUSCULAR
Status: CANCELLED | OUTPATIENT
Start: 2025-05-13

## 2025-05-13 RX ORDER — BISACODYL 10 MG
10 SUPPOSITORY, RECTAL RECTAL DAILY PRN
Status: CANCELLED | OUTPATIENT
Start: 2025-05-13

## 2025-05-13 RX ORDER — HYDROXYZINE HYDROCHLORIDE 25 MG/1
50 TABLET, FILM COATED ORAL
Status: CANCELLED | OUTPATIENT
Start: 2025-05-13

## 2025-05-13 RX ORDER — HYDROXYZINE HYDROCHLORIDE 25 MG/1
100 TABLET, FILM COATED ORAL
Status: CANCELLED | OUTPATIENT
Start: 2025-05-13

## 2025-05-13 RX ORDER — HALOPERIDOL 5 MG/1
5 TABLET ORAL
Status: CANCELLED | OUTPATIENT
Start: 2025-05-13

## 2025-05-13 RX ORDER — LORAZEPAM 2 MG/ML
1 INJECTION INTRAMUSCULAR
Status: CANCELLED | OUTPATIENT
Start: 2025-05-13

## 2025-05-13 RX ORDER — METOCLOPRAMIDE 10 MG/1
10 TABLET ORAL 3 TIMES DAILY PRN
Status: DISCONTINUED | OUTPATIENT
Start: 2025-05-13 | End: 2025-05-14 | Stop reason: HOSPADM

## 2025-05-13 RX ORDER — BENZTROPINE MESYLATE 1 MG/1
1 TABLET ORAL
Status: CANCELLED | OUTPATIENT
Start: 2025-05-13

## 2025-05-13 RX ADMIN — RIMEGEPANT SULFATE 75 MG: 75 TABLET, ORALLY DISINTEGRATING ORAL at 08:51

## 2025-05-13 RX ADMIN — FERROUS SULFATE TAB 325 MG (65 MG ELEMENTAL FE) 325 MG: 325 (65 FE) TAB at 00:02

## 2025-05-13 RX ADMIN — INSULIN LISPRO 1 UNITS: 100 INJECTION, SOLUTION INTRAVENOUS; SUBCUTANEOUS at 17:43

## 2025-05-13 RX ADMIN — ATORVASTATIN CALCIUM 20 MG: 20 TABLET, FILM COATED ORAL at 20:31

## 2025-05-13 RX ADMIN — FERROUS SULFATE TAB 325 MG (65 MG ELEMENTAL FE) 325 MG: 325 (65 FE) TAB at 17:43

## 2025-05-13 RX ADMIN — HEPARIN SODIUM 5000 UNITS: 5000 INJECTION INTRAVENOUS; SUBCUTANEOUS at 05:21

## 2025-05-13 RX ADMIN — GABAPENTIN 300 MG: 300 CAPSULE ORAL at 17:43

## 2025-05-13 RX ADMIN — PANTOPRAZOLE SODIUM 20 MG: 20 TABLET, DELAYED RELEASE ORAL at 17:43

## 2025-05-13 RX ADMIN — GABAPENTIN 300 MG: 300 CAPSULE ORAL at 08:42

## 2025-05-13 RX ADMIN — METOCLOPRAMIDE 10 MG: 5 INJECTION, SOLUTION INTRAMUSCULAR; INTRAVENOUS at 05:20

## 2025-05-13 RX ADMIN — POLYETHYLENE GLYCOL 3350 17 G: 17 POWDER, FOR SOLUTION ORAL at 20:32

## 2025-05-13 RX ADMIN — FERROUS SULFATE TAB 325 MG (65 MG ELEMENTAL FE) 325 MG: 325 (65 FE) TAB at 08:42

## 2025-05-13 RX ADMIN — DIPHENHYDRAMINE HYDROCHLORIDE 25 MG: 25 TABLET ORAL at 20:30

## 2025-05-13 RX ADMIN — PANTOPRAZOLE SODIUM 20 MG: 20 TABLET, DELAYED RELEASE ORAL at 05:21

## 2025-05-13 RX ADMIN — HEPARIN SODIUM 5000 UNITS: 5000 INJECTION INTRAVENOUS; SUBCUTANEOUS at 20:31

## 2025-05-13 RX ADMIN — SUMATRIPTAN SUCCINATE 100 MG: 25 TABLET ORAL at 20:57

## 2025-05-13 RX ADMIN — DULOXETINE 90 MG: 60 CAPSULE, DELAYED RELEASE ORAL at 20:31

## 2025-05-13 RX ADMIN — DIPHENHYDRAMINE HYDROCHLORIDE 25 MG: 50 INJECTION, SOLUTION INTRAMUSCULAR; INTRAVENOUS at 05:21

## 2025-05-13 RX ADMIN — METHOCARBAMOL TABLETS 500 MG: 500 TABLET, COATED ORAL at 19:29

## 2025-05-13 RX ADMIN — GABAPENTIN 300 MG: 300 CAPSULE ORAL at 20:31

## 2025-05-13 NOTE — ASSESSMENT & PLAN NOTE
Patient had intentional drug overdose with multiple medications including gabapentin, Dilaudid, codeine, Tylenol, Robaxin, Klonopin and Cymbalta.  Also commented and texted her friends and family goodbye prior to taking intentional drug overdose.  Patient was monitored in the ICU, and treated with IV fluids.  Toxicology was consulted and initially patient was started NAC but then was discontinued.  Patient was stable and moved out of the ICU.  Currently on the regular floors.  Doing well.  Patient currently is medically stable to be discharged to inpatient psych facility when bed available.  Patient is not wanting to voluntarily go to inpatient psych facility.  Psychiatry consulted  Patient is on one-to-one observation and will be 302d as per psych recommendations.

## 2025-05-13 NOTE — PROGRESS NOTES
Progress Note - Hospitalist   Name: Marva Salgado 54 y.o. female I MRN: [id number]  Unit/Bed#: S -01 I Date of Admission: 5/11/2025   Date of Service: 5/13/2025 I Hospital Day: 2    Assessment & Plan  Intentional overdose (HCC)  Patient had intentional drug overdose with multiple medications including gabapentin, Dilaudid, codeine, Tylenol, Robaxin, Klonopin and Cymbalta.  Also commented and texted her friends and family goodbye prior to taking intentional drug overdose.  Patient was monitored in the ICU, and treated with IV fluids.  Toxicology was consulted and initially patient was started NAC but then was discontinued.  Patient was stable and moved out of the ICU.  Currently on the regular floors.  Doing well.  Patient currently is medically stable to be discharged to inpatient psych facility when bed available.  Patient is not wanting to voluntarily go to inpatient psych facility.  Psychiatry consulted  Patient is on one-to-one observation and will be 302d as per psych recommendations.  Acute respiratory failure with hypoxia and hypercapnia (HCC)  Currently stable.  No respiratory distress.  Was initially requiring 2 L of oxygen    Currently saturating well on room air.  Encephalopathy, toxic  Secondary to drug overdose, currently back to baseline.  Metabolic acidosis  Secondary to polypharmacy and drug overdose.  Now improving.  Continue with IV fluids.  If oral intake improving will discontinue IV fluids  SIRS (systemic inflammatory response syndrome) (HCC)  Secondary to drug overdose.  No infection noted.  No sepsis noted.  Sepsis r/o.  Obesity, Class II, BMI 35-39.9    Type 2 diabetes mellitus without complication, without long-term current use of insulin (HCC)  Lab Results   Component Value Date    HGBA1C 6.7 (H) 05/11/2025       Recent Labs     05/12/25  1724 05/12/25  2325 05/13/25  0522 05/13/25  1122   POCGLU 186* 177* 131 179*       Blood Sugar Average: Last 72 hrs:  (P)  [id number]  Continue low-dose sliding scale insulin with Accu-Chek 4 times daily.    Obstructive sleep apnea syndrome    H/O gastric bypass    h/o Adrenal insufficiency (HCC)    Urinary tract infection    Chronic pain    Depression  Patient is having suicidal ideation and intent.  Plan to shoot discharge the patient to inpatient psych facility when bed available.  302 form filled by Psychiatry and Medicine and given to crisis worker.        Mobility:     Basic Mobility Inpatient Raw Score: 19  JH-HLM Goal: 6: Walk 10 steps or more  JH-HLM Achieved: 6: Walk 10 steps or more  HLM Goal achieved. Continue to encourage appropriate mobility.        Mobility:     Basic Mobility Inpatient Raw Score: 19  JH-HLM Goal: 6: Walk 10 steps or more  JH-HLM Achieved: 6: Walk 10 steps or more  HLM Goal achieved. Continue to encourage appropriate mobility.    Pharmacologic VTE Prophylaxis: Yes   Mechanical VTE Prophylaxis in Place: No   Patient Centered Rounds: I have performed bedside rounds with the Nursing staff today.   Current Length of Stay: 2 day(s)  Current Patient Status: Inpatient   Code Status: Level 1 - Full Code  Time Spent for Care:  35 minutes.  More than 50% of total time spent on counseling and coordination of care as described above.  Discussions with Specialists or Other Care Team Provider: Yes Psychiatry  Education and Discussions with Family / Patient: No Patient refused.   Discharge Plan: 24-48 hrs.   Case Discussed with  regarding updating plan of care and disposition planning.   Certification Statement: The patient will continue to require additional inpatient hospital stay due to tensional drug overdose, suicidal ideation and depression.    Subjective:   I have seen and Examined the patient at the bedside. No CP or Sob. No fevers or chills, No nausea or vomiting. Overnight events reviewed with the RN. No Other complains.     Review of System:   Denies any CP or SOB  Denies any Cough or  Cold  Denies any Fevers or chills.   Denies any focal tingling numbness or weakness in any extremities.   Denies any abdominal pain, Nausea or vomiting.     Objective:   Temp (24hrs), Av.5 °F (36.9 °C), Min:98.5 °F (36.9 °C), Max:98.5 °F (36.9 °C)    Temp:  [98.5 °F (36.9 °C)] 98.5 °F (36.9 °C)  HR:  [] 99  Resp:  [18] 18  BP: (109-146)/(72-91) 146/91  SpO2:  [94 %-96 %] 94 %  Body mass index is 30.46 kg/m².     Input and Output Summary (last 24 hours):     Intake/Output Summary (Last 24 hours) at 2025 1904  Last data filed at 2025 1234  Gross per 24 hour   Intake 120 ml   Output 1 ml   Net 119 ml     I/O          0701   0700  0701   0700    P.O. 960 120    I.V. (mL/kg) 360 (4.2)     IV Piggyback 564.5     Total Intake(mL/kg) 1884.5 (22) 120 (1.4)    Urine (mL/kg/hr) 300 (0.1) 1 (0)    Total Output 300 1    Net +1584.5 +119          Unmeasured Urine Occurrence 1 x             Physical Exam:   General : Alert, Awake and oriented x 2-3, NAD.   Neck : Supple.   Eyes:  MASSIEL, EOMI.   CVS : S1, S2, RRR.   R/S : Clear to auscultate anteriorly.   Abd: Soft, NT, ND. Bs+ve  Extremity: Trace pedal edema noted.   Skin: No acute Rash noted.   CNS: No acute FND.     Additional Data:     Labs, Culture & Imaging Data Reviewed:    Results from last 7 days   Lab Units 25  0519   WBC Thousand/uL 7.79   HEMOGLOBIN g/dL 8.4*   HEMATOCRIT % 28.4*   PLATELETS Thousands/uL 297     Results from last 7 days   Lab Units 25  0519   POTASSIUM mmol/L 3.9   CHLORIDE mmol/L 109*   CO2 mmol/L 23   BUN mg/dL 11   CREATININE mg/dL 0.72   CALCIUM mg/dL 8.5   ALK PHOS U/L 81   ALT U/L 22   AST U/L 16         Lab Results   Component Value Date    HGBA1C 6.7 (H) 2025      XR chest 1 view portable   ED Interpretation by Javier Sutton MD ([id number])   Mild perihilar fullness, mild blunting left costophrenic angle      Final Result by Anton Damico MD ( [id number])      No acute  "cardiopulmonary disease.               Resident: Bobby Benites I, the attending radiologist, have reviewed the images and agree with the final report above.      Workstation performed: [id number]             Cultures:   Blood Culture:   Lab Results   Component Value Date    BLOODCX No Growth at 24 hrs. 05/12/2025    BLOODCX No Growth at 24 hrs. 05/12/2025     Urine Culture: No results found for: \"URINECX\"  Sputum Culture: No components found for: \"SPUTUMCX\"  Wound Culture: No results found for: \"WOUNDCULT\"    Last 24 Hours Medication List:   Current Facility-Administered Medications   Medication Dose Route Frequency Provider Last Rate    atorvastatin  20 mg Oral HS Amy Casas MD      diphenhydrAMINE  25 mg Oral Q6H PRN Gorge Coe MD      DULoxetine  90 mg Oral HS Mary Kay Ramirez PA-C      ferrous sulfate  325 mg Oral BID With Meals Amy Casas MD      gabapentin  300 mg Oral TID Mary Kay Ramirez PA-C      heparin (porcine)  5,000 Units Subcutaneous Q8H [facility] Amy Casas MD      insulin lispro  1-6 Units Subcutaneous Q6H [facility] Amy Casas MD      levalbuterol  1.25 mg Nebulization Q8H PRN Amy Casas MD      methocarbamol  500 mg Oral Q6H PRN Mary Kay Ramirez PA-C      metoclopramide  10 mg Oral TID PRN Gorge Coe MD      pantoprazole  20 mg Oral BID AC Mary Kay Ramirez PA-C      polyethylene glycol  17 g Oral Daily PRN Amy Casas MD      rimegepant sulfate  75 mg Oral Daily Amy Casas MD           Patient is at moderate risk for morbidity and mortality due to above mentioned illness and comorbidities.           "

## 2025-05-13 NOTE — OCCUPATIONAL THERAPY NOTE
Occupational Therapy Evaluation     Patient Name: Marva Salgado  Today's Date: 5/13/2025  Problem List  Principal Problem:    Intentional overdose (HCC)  Active Problems:    Obesity, Class II, BMI 35-39.9    Type 2 diabetes mellitus without complication, without long-term current use of insulin (HCC)    Obstructive sleep apnea syndrome    H/O gastric bypass    h/o Adrenal insufficiency (HCC)    Urinary tract infection    Acute respiratory failure with hypoxia and hypercapnia (HCC)    Encephalopathy, toxic    Metabolic acidosis    Severe sepsis (HCC)    Chronic pain    Depression    Past Medical History  Past Medical History:   Diagnosis Date    Arthritis     Asthma     CPAP (continuous positive airway pressure) dependence     Depression     Diabetes mellitus (HCC)     Gait disturbance     uses w/c for long distance    Gastritis     Hiatal hernia     Hx of fusion of cervical spine     Hypertension     Migraines     Morbid obesity (HCC)     PONV (postoperative nausea and vomiting)     difficulty awakening- too much CO2    Pulmonary emboli (HCC)     hx blood clot also in neck    S/P insertion of spinal cord stimulator     Sleep apnea     Tachycardia     Wears contact lenses      Past Surgical History  Past Surgical History:   Procedure Laterality Date    COLONOSCOPY      DISCECTOMY SPINE CERVICAL POSTERIOR      HYSTERECTOMY      and bladder sling    JOINT REPLACEMENT      left TKR and partial right    NECK SURGERY      cervical fusion    NC LAPS GSTR RSTCV PX W/BYP GWYN-EN-Y LIMB <150 CM N/A 11/30/2021    Procedure: ROBOTIC BYPASS GASTRIC GWYN-EN-Y,INTRAOP EGD, PARAESOPHAGEAL HERNIA REPAIR W/ MESH;  Surgeon: Gabriel Gutierrez MD;  Location: [facility];  Service: Bariatrics    SHOULDER OPEN ROTATOR CUFF REPAIR      SPINAL CORD STIMULATOR IMPLANT           05/13/25 2808   OT Last Visit   OT Visit Date 05/13/25   Note Type   Note type Evaluation   Pain Assessment   Pain Assessment Tool 0-10   Pain Score 9   Pain  "Location/Orientation Location: Head   Pain Onset/Description Descriptor: Headache   Patient's Stated Pain Goal No pain   Hospital Pain Intervention(s) Repositioned;Ambulation/increased activity  (RN made aware)   Restrictions/Precautions   Weight Bearing Precautions Per Order No   Other Precautions 1:1;Pain;Suicidal;Fall Risk   Home Living   Type of Home House  (split level home)   Home Layout Two level;Performs ADLs on one level;Able to live on main level with bedroom/bathroom;Ramped entrance  (maintains FFSU.)   Bathroom Shower/Tub Tub/shower unit   Bathroom Toilet Standard   Bathroom Equipment Shower chair;Grab bars in shower   Bathroom Accessibility Accessible via walker   Home Equipment Walker;Wheelchair-manual  (rollator, WC)   Prior Function   Level of [place] Independent with ADLs;Independent with functional mobility;Independent with IADLS   Lives With Spouse   Receives Help From Family   IADLs Independent with driving;Independent with meal prep;Independent with medication management   Falls in the last 6 months 1 to 4  (3 per pt , pt attributes to POTS)   Vocational Unemployed   Comments rollator household distances, WC community distances   Lifestyle   Autonomy PTA pt is (I) c ADLs, IADLs, mod (I) c rollator vs WC for longer distances. Lives c spouse. + driving + falls (-) working   Reciprocal Relationships sons x 2, spouse   Service to Others unemployed.   Intrinsic Gratification pt reclines, reports \"nothing\"   General   Additional Pertinent History Pt admitted d/t intentional overdose via polypharmacy, required CPR on route to hospital.  Complicated by toxic encephalopathy, ARF c hypoxia, severe sepsis, metabolic acidosis. Hx of chronic pain, DM2, obesity, s/p  ORIF c IM nail of  comminuted  L distal femur frature in Jan. Self reported hx of POTS.   Family/Caregiver Present No   Subjective   Subjective Agreeable to OT session.   ADL   Eating Assistance 7  Independent   Grooming Assistance 5  " Supervision/Setup   UB Bathing Assistance 5  Supervision/Setup   LB Bathing Assistance 5  Supervision/Setup   UB Dressing Assistance 5  Supervision/Setup   LB Dressing Assistance 5  Supervision/Setup   Toileting Assistance  5  Supervision/Setup   Functional Assistance 5  Supervision/Setup   Bed Mobility   Supine to Sit 6  Modified independent   Additional items Bedrails   Sit to Supine 6  Modified independent   Additional items Bedrails   Transfers   Sit to Stand 5  Supervision   Additional items Assist x 1;Impulsive   Stand to Sit 5  Supervision   Additional items Impulsive   Additional Comments RW used during transfers   Functional Mobility   Functional Mobility 5  Supervision   Additional Comments functional mobility household distance within room c use of RW. somewhat impulsive, declines further mobility and impulsively returns to bed   Balance   Static Sitting Good   Dynamic Sitting Fair +   Static Standing Fair   Activity Tolerance   Activity Tolerance Patient limited by fatigue;Patient limited by pain   Medical Staff Made Aware Care coordination c PT Cassie. AYE Laguerre   Nurse Made Aware EDWIN Huitron, NIKUNJ 1:1   RUE Assessment   RUE Assessment WFL  (MMT grossly 4/5 based on functional assessment)   LUE Assessment   LUE Assessment WFL  (MMT grossly 4/5 based on functional assessment)   Hand Function   Gross Motor Coordination Functional   Fine Motor Coordination Functional   Sensation   Light Touch No apparent deficits   Vision-Basic Assessment   Current Vision Wears glasses all the time   Cognition   Overall Cognitive Status WFL   Arousal/Participation Alert   Attention Attends with cues to redirect   Orientation Level Oriented to person;Oriented to place   Memory Decreased recall of precautions   Following Commands Follows one step commands without difficulty   Comments irritable during session, flat affect.   Assessment   Limitation Decreased ADL status;Decreased endurance;Decreased self-care trans;Decreased  "high-level ADLs;Mood limitation;Decreased Safe judgement during ADL  (pain, safety awareness, emotional regulation)   Prognosis Good   Assessment Patient is a 54 y.o. female seen for OT evaluation at [facility] following admission on 5/11/2025  s/p Intentional overdose (HCC). Please see above for comprehensive list of comorbidities and significant PMHx impacting functional performance.  Upon initial evaluation, pt appears to be performing below baseline functional status.   Occupational performance is affected by the following deficits: endurance ,  decreased muscular strength , decreased balance , impaired judgement and problem solving , decreased emotional regulation and coping skills , and impaired safety awareness . Personal/Environmental factors impacting D/C include: current 302 to IP psych. Supporting factors include: accessible home environment Patient would benefit from OT services within the acute care setting to maximize level of functional independence in the following areas self-care transfers, functional mobility, and ADLs.  From OT standpoint, recommendation at time of D/C would be level 4: no rehabilitation needs .   Goals   Patient Goals pt states \"nothing\" upon questioning   Plan   Treatment Interventions ADL retraining;Functional transfer training;Endurance training;Patient/family training;Equipment evaluation/education;Activityengagement   Goal Expiration Date 05/23/25   OT Treatment Day 0   OT Frequency 1-2x/wk   Discharge Recommendation   Rehab Resource Intensity Level, OT No post-acute rehabilitation needs   Additional Comments  The patient's raw score on the AM-PAC Daily Activity Inpatient Short Form is 19. A raw score of less than 19 suggests the patient may benefit from discharge to post-acute rehabilitation services. Please refer to the recommendation of the Occupational Therapist for safe discharge planning.   AM-PAC Daily Activity Inpatient   Lower Body Dressing 3 "   Bathing 3   Toileting 3   Upper Body Dressing 3   Grooming 3   Eating 4   Daily Activity Raw Score 19   Daily Activity Standardized Score (Calc for Raw Score >=11) 40.22   AM-PAC Applied Cognition Inpatient   Following a Speech/Presentation 3   Understanding Ordinary Conversation 4   Taking Medications 2   Remembering Where Things Are Placed or Put Away 3   Remembering List of 4-5 Errands 3   Taking Care of Complicated Tasks 3   Applied Cognition Raw Score 18   Applied Cognition Standardized Score 38.07   End of Consult   Education Provided Yes   Patient Position at End of Consult Bed/Chair alarm activated;All needs within reach;Supine   Nurse Communication Nurse aware of consult  (PCA 1:1 present)   Goals established on initial evaluation in order to achieve goal of maximizing functional independence during ADL tasks.      Pt will complete UB ADLs Mod independent   for increased ADL independence within 10 days.     Pt will complete LB ADLs Mod independent   for increased ADL independence within 10 days.     Pt will complete toileting Mod independent   with use of DME for increased ADL independence within 10 days.     Pt will demonstrate proper body mechanics to complete self-care transfers and functional mobility with Mod independent  and use of LRAD for increased safety and functional independence within 10 days.     Pt will demonstrate standing tolerance of 10 min for increased activity tolerance during ADL/IADL tasks within 10 days.     Pt will demonstrate proper body mechanics and fall prevention strategies during 100% of tx sessions for increased safety awareness during ADL/IADLs    Pt will demonstrate activity tolerance of 30 min in therapeutic tasks for increased participation in meaningful activities upon D/C.    Pt will integrate self pacing strategies into ADL tasks for improved safety awareness and impulsively during ADL tasks.     Marjorie Adams, OT

## 2025-05-13 NOTE — ED NOTES
Per Ashwin STAFFORD patient was referred for in network bed. There is a request for a TSH for her chart.  Also see if MRSA is still an issue as its on her lemuel.  If that is an ongoing issue she will need a single room, which there currently are no beds at this time.      Provider and LSW notified of this.   Ordered TSH.

## 2025-05-13 NOTE — ASSESSMENT & PLAN NOTE
Currently stable.  No respiratory distress.  Was initially requiring 2 L of oxygen    Currently saturating well on room air.

## 2025-05-13 NOTE — ASSESSMENT & PLAN NOTE
Lab Results   Component Value Date    HGBA1C 6.7 (H) 05/11/2025       Recent Labs     05/12/25  1724 05/12/25  2325 05/13/25  0522 05/13/25  1122   POCGLU 186* 177* 131 179*       Blood Sugar Average: Last 72 hrs:  (P) 161.4517304421412323  Continue low-dose sliding scale insulin with Accu-Chek 4 times daily.

## 2025-05-13 NOTE — ASSESSMENT & PLAN NOTE
Secondary to polypharmacy and drug overdose.  Now improving.  Continue with IV fluids.  If oral intake improving will discontinue IV fluids

## 2025-05-13 NOTE — ASSESSMENT & PLAN NOTE
Lab Results   Component Value Date    HGBA1C 6.7 (H) 05/11/2025       Recent Labs     05/12/25  1202 05/12/25  1724 05/12/25  2325 05/13/25  0522   POCGLU 96 186* 177* 131       Blood Sugar Average: Last 72 hrs:  (P) 159.125  Per primary team

## 2025-05-13 NOTE — ASSESSMENT & PLAN NOTE
Assessment:    Marva Salgado is a 54 y.o. female, , with past psychiatric history of depression and anxiety, and past medical history of type 2 diabetes melitis, CHIDI, adrenal insufficiency, asthma, chronic pain, migraines who was admitted to [facility] ICU on 5/11/2025 due to intentional overdose. Psychiatric consultation was requested for management of intentional overdose.  On evaluation, patient is calm, cooperative, guarded, minimizing events.  After speak with patient's ex- and close friend, there appears to be text messages saying that she took a lot of pills and made goodbye statements.  Although patient adamantly denies that this was a suicide attempt, she does appear to be minimizing.  She also has a similar past attempt around 10 years ago.  At this time, patient is presently adhering to criteria for inpatient psychiatric admission.     Update as of 5/13/2025: Patient continues to deny that this is a suicide attempt.  She is still not amenable to voluntary inpatient psychiatric commitment.  Given the severity of the suicide attempt and collateral information, we will petition 302 for involuntary psychiatric commitment.    Principal Psychiatric Problem:  Unspecified mood disorder (HCC)  Differential includes, but is not limited to: MDD v. Adjustment disorder    Plan:   Discussed with primary team, with the following recommendations:    Admission labs reviewed.  At this time, patient is not agreeable to 201 voluntary commitment.  Per primary team, patient is now medically cleared.  302 petitioned by this provider and upheld by primary team.  Pending placement.  Recommend the following changes in psychiatric medication at this time:  Restart Cymbalta 90 mg daily for mood, chronic pain, and anxiety  Observation level: 1:1 observation for patient safety  Collaborate with collaterals for baseline assessment and disposition as indicated  Psychiatry will continue to follow as needed. Please  contact our service via Epic Chat with any additional questions or concerns. If contacting after hours, please call or Epic Chat the on-call team ([facility]: [phone]) with any questions or concerns.    Risks, benefits and possible side effects of Medications: No new medications at this time.

## 2025-05-13 NOTE — PROGRESS NOTES
Progress Note - Behavioral Health   Name: Marva Salgado 54 y.o. female I MRN: [id number]  Unit/Bed#: S -01 I Date of Admission: 5/11/2025   Date of Service: 5/13/2025 I Hospital Day: 2    Assessment & Plan  Intentional overdose (HCC)  Assessment:    Marva Salgado is a 54 y.o. female, , with past psychiatric history of depression and anxiety, and past medical history of type 2 diabetes melitis, CHIDI, adrenal insufficiency, asthma, chronic pain, migraines who was admitted to [facility] ICU on 5/11/2025 due to intentional overdose. Psychiatric consultation was requested for management of intentional overdose.  On evaluation, patient is calm, cooperative, guarded, minimizing events.  After speak with patient's ex- and close friend, there appears to be text messages saying that she took a lot of pills and made goodbye statements.  Although patient adamantly denies that this was a suicide attempt, she does appear to be minimizing.  She also has a similar past attempt around 10 years ago.  At this time, patient is presently adhering to criteria for inpatient psychiatric admission.     Update as of 5/13/2025: Patient continues to deny that this is a suicide attempt.  She is still not amenable to voluntary inpatient psychiatric commitment.  Given the severity of the suicide attempt and collateral information, we will petition 302 for involuntary psychiatric commitment.    Principal Psychiatric Problem:  Unspecified mood disorder (HCC)  Differential includes, but is not limited to: MDD v. Adjustment disorder    Plan:   Discussed with primary team, with the following recommendations:    Admission labs reviewed.  At this time, patient is not agreeable to 201 voluntary commitment.  Per primary team, patient is now medically cleared.  302 petitioned by this provider and upheld by primary team.  Pending placement.  Recommend the following changes in psychiatric medication at this time:  Restart Cymbalta 90  "mg daily for mood, chronic pain, and anxiety  Observation level: 1:1 observation for patient safety  Collaborate with collaterals for baseline assessment and disposition as indicated  Psychiatry will continue to follow as needed. Please contact our service via "[employer]" Chat with any additional questions or concerns. If contacting after hours, please call or Epic Chat the on-call team (BASSEM: [phone]) with any questions or concerns.    Risks, benefits and possible side effects of Medications: No new medications at this time.   Obesity, Class II, BMI 35-39.9  Per primary team  Type 2 diabetes mellitus without complication, without long-term current use of insulin (HCC)  Lab Results   Component Value Date    HGBA1C 6.7 (H) 05/11/2025       Recent Labs     05/12/25  1202 05/12/25  1724 05/12/25  2325 05/13/25  0522   POCGLU 96 186* 177* 131       Blood Sugar Average: Last 72 hrs:  (P) 159.125  Per primary team  Obstructive sleep apnea syndrome  Per primary team  H/O gastric bypass  Per primary team  h/o Adrenal insufficiency (HCC)  Per primary team  Urinary tract infection  Per primary team  Acute respiratory failure with hypoxia and hypercapnia (HCC)  Per primary team  Encephalopathy, toxic  Per primary team  Metabolic acidosis  Per primary team  Severe sepsis (HCC)  Per primary team  Chronic pain  Per primary team  Depression  See principal problem       SUBJECTIVE     Patient was seen and evaluated for continuity of care. Marva appears to be resting in bed, is calm and cooperative with interview. She reports she still has a migraine, which has remained despite medications given here. She otherwise denies complaints. She describes her mood as \"fine\". She reports she was not able to sleep well last night because of the beeping noises and her hard bed, and estimates she got around 3 hours broken up throughout the night. Her appetite has been good and she ate breakfast. Patient states she feels safe in the hospital, " "and denies current active or passive suicidal or homicidal ideation, intent, or plan.  Denies auditory or visual hallucinations. Updated patient that her Cymbalta was restarted last evening, and she reports tolerating it well with no side effects noted.    When asked if she had thought further about inpatient psychiatric admission, patient states she does not want to sign in voluntarily and continues to minimize events.     Psychiatric Review of Systems:  Behavior over the last 24 hours: unchanged  Sleep: frequent awakenings  Appetite: adequate  Medication side effects: none verbalized  ROS:  headache unchanged, Complete review of systems is negative except as noted above.    OBJECTIVE     Vital signs in last 24 hours:  Temp:  [98 °F (36.7 °C)-98.5 °F (36.9 °C)] 98.5 °F (36.9 °C)  HR:  [] 88  BP: (109-144)/(66-81) 118/81  Resp:  [18-19] 18  SpO2:  [92 %-96 %] 96 %  O2 Device: None (Room air)    Mental Status Evaluation:  Appearance:  age appropriate, dressed in hospital attire, looks stated age, overweight, resting in bed   Behavior:  cooperative, calm, guarded, adequate eye contact, minimizing    Speech:  normal rate and volume, clear, coherent   Mood:  \"Fine\"   Affect:  constricted   Thought Process:  organized, logical, goal directed   Associations: intact associations   Thought Content:  no overt delusions   Perceptual Disturbances: no auditory hallucinations, no visual hallucinations   Risk Potential: Suicidal ideation - None at present  Homicidal ideation - None at present  Potential for aggression - No   Sensorium:  oriented to person, place, and time/date   Memory:  recent and remote memory grossly intact   Consciousness:  alert and awake   Attention/Concentration: attention span and concentration are age appropriate   Intellect: within normal limits   Fund of Knowledge: awareness of current events: yes   Insight:  limited   Judgment: limited   Muscle Strength Muscle Tone: normal  normal "   Gait/Station: unable to assess as laying in bed   Motor Activity: no abnormal movements           ACTIVE MEDICATIONS     Current Medications:  Current Facility-Administered Medications   Medication Dose Route Frequency Provider Last Rate    atorvastatin  20 mg Oral HS Amy Casas MD      cefTRIAXone  1,000 mg Intravenous Q24H Amy Casas MD 1,000 mg (05/12/25 2314)    diphenhydrAMINE  25 mg Intravenous Q8H JANNIE Amy Casas MD      DULoxetine  90 mg Oral HS Mary Kay Ramirez PA-C      ferrous sulfate  325 mg Oral BID With Meals Amy Casas MD      gabapentin  300 mg Oral TID Mary Kay Ramirez PA-C      heparin (porcine)  5,000 Units Subcutaneous Q8H JANNIE Amy Casas MD      insulin lispro  1-6 Units Subcutaneous Q6H JANNIE Casas MD      levalbuterol  1.25 mg Nebulization Q8H PRN Amy Casas MD      magnesium sulfate  2 g Intravenous Q24H Amy Casas MD Stopped (05/12/25 1430)    methocarbamol  500 mg Oral Q6H PRN Mary Kay Ramirez PA-C      metoclopramide  10 mg Intravenous Q8H JANNIE Amy Casas MD      pantoprazole  20 mg Oral BID AC Mary Kay Ramirez PA-C      polyethylene glycol  17 g Oral Daily PRN Amy Casas MD      rimegepant sulfate  75 mg Oral Daily Amy Casas MD         Behavioral Health Medications: I have personally reviewed all current active medications. Changes as in plan section above.      ADDITIONAL DATA     EKG Results: I have personally reviewed pertinent reports.  No results found for this visit on 05/11/25 (from the past 1000 hours).    Radiology Results: I have personally reviewed pertinent reports. I have personally reviewed pertinent films in PACS.  XR chest 1 view portable  Result Date: 5/12/2025  Impression: No acute cardiopulmonary disease. Resident: Bobby Benites I, the attending radiologist, have reviewed the images and agree with the final report above. Workstation performed: [id number]     XR chest 1 view portable  Result Date: 5/12/2025  Impression No acute cardiopulmonary  disease. Resident: Bobby Benites I, the attending radiologist, have reviewed the images and agree with the final report above. Workstation performed: [id number]        Laboratory Results: I have personally reviewed all pertinent laboratory/tests results.  Recent Results (from the past 48 hours)   Fingerstick Glucose (POCT)    Collection Time: 05/11/25  9:00 PM   Result Value Ref Range    POC Glucose 288 (H) 65 - 140 mg/dl   CBC and differential    Collection Time: 05/11/25  9:05 PM   Result Value Ref Range    WBC 10.07 4.31 - 10.16 Thousand/uL    RBC 4.65 3.81 - 5.12 Million/uL    Hemoglobin 10.4 (L) 11.5 - 15.4 g/dL    Hematocrit 36.9 34.8 - 46.1 %    MCV 79 (L) 82 - 98 fL    MCH 22.4 (L) 26.8 - 34.3 pg    MCHC 28.2 (L) 31.4 - 37.4 g/dL    RDW 17.7 (H) 11.6 - 15.1 %    MPV 9.0 8.9 - 12.7 fL    Platelets 380 149 - 390 Thousands/uL    nRBC 0 /100 WBCs    Segmented % 65 43 - 75 %    Immature Grans % 1 0 - 2 %    Lymphocytes % 27 14 - 44 %    Monocytes % 6 4 - 12 %    Eosinophils Relative 1 0 - 6 %    Basophils Relative 0 0 - 1 %    Absolute Neutrophils 6.53 1.85 - 7.62 Thousands/µL    Absolute Immature Grans 0.10 0.00 - 0.20 Thousand/uL    Absolute Lymphocytes 2.69 0.60 - 4.47 Thousands/µL    Absolute Monocytes 0.57 0.17 - 1.22 Thousand/µL    Eosinophils Absolute 0.14 0.00 - 0.61 Thousand/µL    Basophils Absolute 0.04 0.00 - 0.10 Thousands/µL   Comprehensive metabolic panel    Collection Time: 05/11/25  9:05 PM   Result Value Ref Range    Sodium 140 135 - 147 mmol/L    Potassium 4.1 3.5 - 5.3 mmol/L    Chloride 108 96 - 108 mmol/L    CO2 22 21 - 32 mmol/L    ANION GAP 10 4 - 13 mmol/L    BUN 13 5 - 25 mg/dL    Creatinine 1.15 0.60 - 1.30 mg/dL    Glucose 286 (H) 65 - 140 mg/dL    Calcium 8.1 (L) 8.4 - 10.2 mg/dL    AST 53 (H) 13 - 39 U/L    ALT 41 7 - 52 U/L    Alkaline Phosphatase 116 (H) 34 - 104 U/L    Total Protein 6.0 (L) 6.4 - 8.4 g/dL    Albumin 3.9 3.5 - 5.0 g/dL    Total Bilirubin 0.38 0.20 - 1.00  mg/dL    eGFR 53 ml/min/1.73sq m   Ethanol    Collection Time: 05/11/25  9:05 PM   Result Value Ref Range    Ethanol Lvl <10 <10 mg/dL   Salicylate level    Collection Time: 05/11/25  9:05 PM   Result Value Ref Range    Salicylate Lvl <5 (L) 5 - 20 mg/dL   Acetaminophen level-If concentration is detectable, please discuss with medical  on call.    Collection Time: 05/11/25  9:05 PM   Result Value Ref Range    Acetaminophen Level 23 (H) 10 - 20 ug/mL   Rapid drug screen, urine    Collection Time: 05/11/25  9:20 PM   Result Value Ref Range    Amph/Meth UR Negative Negative    Barbiturate Ur Negative Negative    Benzodiazepine Urine Negative Negative    Cocaine Urine Negative Negative    Methadone Urine Negative Negative    Opiate Urine Negative Negative    PCP Ur Negative Negative    THC Urine Negative Negative    Oxycodone Urine Negative Negative    Fentanyl Urine Negative Negative    HYDROCODONE URINE Positive (A) Negative   CK    Collection Time: 05/11/25  9:20 PM   Result Value Ref Range    Total CK 50 26 - 192 U/L   UA (URINE) with reflex to Scope    Collection Time: 05/11/25  9:20 PM   Result Value Ref Range    Color, UA Yellow     Clarity, UA Turbid     Specific Gravity, UA 1.018 1.003 - 1.030    pH, UA 5.5 4.5, 5.0, 5.5, 6.0, 6.5, 7.0, 7.5, 8.0    Leukocytes, UA (A) Negative     Elevated glucose may cause decreased leukocyte values. See urine microscopic for UWBC result    Nitrite, UA Positive (A) Negative    Protein, UA Trace (A) Negative mg/dl    Glucose, UA >=1000 (1%) (A) Negative mg/dl    Ketones, UA Negative Negative mg/dl    Urobilinogen, UA <2.0 <2.0 mg/dl mg/dl    Bilirubin, UA Negative Negative    Occult Blood, UA Negative Negative   Blood gas, venous    Collection Time: 05/11/25  9:20 PM   Result Value Ref Range    pH, Rahul 7.168 (LL) 7.300 - 7.400    pCO2, Rahul 56.8 (H) 42.0 - 50.0 mm Hg    pO2, Rahul 63.0 (H) 35.0 - 45.0 mm Hg    HCO3, Rahul 20.2 (L) 24 - 30 mmol/L    Base Excess, Rahul -8.6  mmol/L    O2 Content, Rahul 13.5 ml/dL    O2 HGB, VENOUS 84.1 (H) 60.0 - 80.0 %   Urine Microscopic    Collection Time: 05/11/25  9:20 PM   Result Value Ref Range    RBC, UA None Seen None Seen, 1-2 /hpf    WBC, UA 4-10 (A) None Seen, 1-2 /hpf    Epithelial Cells None Seen None Seen, Occasional /hpf    Bacteria, UA Moderate (A) None Seen, Occasional /hpf    Hyaline Casts, UA 10-25 (A) None Seen /lpf    Amorphous Crystals, UA Occasional     Ca Oxalate Elyssa, UA Moderate (A) None Seen /hpf   TIBC Panel (incl. Iron, TIBC, % Iron Saturation)    Collection Time: 05/11/25  9:20 PM   Result Value Ref Range    Iron Saturation 5 (L) 15 - 50 %    TIBC 471.8 (H) 250 - 450 ug/dL    Iron 22 (L) 50 - 212 ug/dL    Transferrin 337 203 - 362 mg/dL    UIBC 450 (H) 155 - 355 ug/dL   Ferritin    Collection Time: 05/11/25  9:20 PM   Result Value Ref Range    Ferritin 7 (L) 30 - 307 ng/mL   ECG 12 lead    Collection Time: 05/11/25  9:23 PM   Result Value Ref Range    Ventricular Rate 99 BPM    Atrial Rate 99 BPM    IA Interval 124 ms    QRSD Interval 88 ms    QT Interval 364 ms    QTC Interval 467 ms    P Axis 43 degrees    QRS Axis 29 degrees    T Wave Axis -7 degrees   Lactic acid, plasma (w/reflex if result > 2.0)    Collection Time: 05/11/25 11:05 PM   Result Value Ref Range    LACTIC ACID <0.2 (L) 0.5 - 2.0 mmol/L   Blood gas, venous    Collection Time: 05/11/25 11:48 PM   Result Value Ref Range    pH, Rahul 7.106 (LL) 7.300 - 7.400    pCO2, Rahul 55.7 (H) 42.0 - 50.0 mm Hg    pO2, Rahul 42.3 35.0 - 45.0 mm Hg    HCO3, Rahul 17.1 (L) 24 - 30 mmol/L    Base Excess, Rahul -12.1 mmol/L    O2 Content, Rahul 9.4 ml/dL    O2 HGB, VENOUS 70.3 60.0 - 80.0 %   Hemoglobin A1c w/EAG Estimation (Prechecked if no A1C within 90 days)    Collection Time: 05/11/25 11:48 PM   Result Value Ref Range    Hemoglobin A1C 6.7 (H) Normal 4.0-5.6%; PreDiabetic 5.7-6.4%; Diabetic >=6.5%; Glycemic control for adults with diabetes <7.0% %     mg/dl   Fingerstick  "Glucose (POCT)    Collection Time: 05/12/25 12:11 AM   Result Value Ref Range    POC Glucose 150 (H) 65 - 140 mg/dl   HS Troponin 0hr (reflex protocol)    Collection Time: 05/12/25 12:43 AM   Result Value Ref Range    hs TnI 0hr 16 \"Refer to ACS Flowchart\"- see link ng/L   Hepatic function panel    Collection Time: 05/12/25 12:43 AM   Result Value Ref Range    Total Bilirubin 0.25 0.20 - 1.00 mg/dL    Bilirubin, Direct 0.00 0.00 - 0.20 mg/dL    Alkaline Phosphatase 85 34 - 104 U/L    AST 38 13 - 39 U/L    ALT 36 7 - 52 U/L    Total Protein 5.6 (L) 6.4 - 8.4 g/dL    Albumin 3.5 3.5 - 5.0 g/dL   Acetaminophen level-\"If concentration is detectable, please discuss with medical  on call.\"    Collection Time: 05/12/25 12:43 AM   Result Value Ref Range    Acetaminophen Level 7 (L) 10 - 20 ug/mL   Fingerstick Glucose (POCT)    Collection Time: 05/12/25 12:55 AM   Result Value Ref Range    POC Glucose 134 65 - 140 mg/dl   Blood gas, venous    Collection Time: 05/12/25  1:30 AM   Result Value Ref Range    pH, Rahul 7.242 (L) 7.300 - 7.400    pCO2, Rahul 50.9 (H) 42.0 - 50.0 mm Hg    pO2, Rahul 40.9 35.0 - 45.0 mm Hg    HCO3, Rahul 21.4 (L) 24 - 30 mmol/L    Base Excess, Rahul -5.9 mmol/L    O2 Content, Rahul 8.8 ml/dL    O2 HGB, VENOUS 63.8 60.0 - 80.0 %   Lactic acid, plasma (w/reflex if result > 2.0)    Collection Time: 05/12/25  1:30 AM   Result Value Ref Range    LACTIC ACID 0.9 0.5 - 2.0 mmol/L   Procalcitonin    Collection Time: 05/12/25  1:30 AM   Result Value Ref Range    Procalcitonin 0.13 <=0.25 ng/ml   Comprehensive metabolic panel    Collection Time: 05/12/25  1:30 AM   Result Value Ref Range    Sodium 140 135 - 147 mmol/L    Potassium 4.3 3.5 - 5.3 mmol/L    Chloride 106 96 - 108 mmol/L    CO2 23 21 - 32 mmol/L    ANION GAP 11 4 - 13 mmol/L    BUN 12 5 - 25 mg/dL    Creatinine 0.88 0.60 - 1.30 mg/dL    Glucose 202 (H) 65 - 140 mg/dL    Calcium 7.1 (L) 8.4 - 10.2 mg/dL    Corrected Calcium 7.6 (L) 8.3 - 10.1 mg/dL " "   AST 34 13 - 39 U/L    ALT 32 7 - 52 U/L    Alkaline Phosphatase 79 34 - 104 U/L    Total Protein 5.4 (L) 6.4 - 8.4 g/dL    Albumin 3.4 (L) 3.5 - 5.0 g/dL    Total Bilirubin 0.23 0.20 - 1.00 mg/dL    eGFR 74 ml/min/1.73sq m   Blood culture    Collection Time: 05/12/25  2:22 AM    Specimen: Arm, Left; Blood   Result Value Ref Range    Blood Culture No Growth at 24 hrs.    Blood culture    Collection Time: 05/12/25  2:59 AM    Specimen: Arm, Left; Blood   Result Value Ref Range    Blood Culture No Growth at 24 hrs.    HS Troponin I 2hr    Collection Time: 05/12/25  2:59 AM   Result Value Ref Range    hs TnI 2hr 22 \"Refer to ACS Flowchart\"- see link ng/L    Delta 2hr hsTnI 6 <20 ng/L   HS Troponin I 4hr    Collection Time: 05/12/25  4:49 AM   Result Value Ref Range    hs TnI 4hr 17 \"Refer to ACS Flowchart\"- see link ng/L    Delta 4hr hsTnI 1 <20 ng/L   CBC and differential    Collection Time: 05/12/25  4:49 AM   Result Value Ref Range    WBC 9.00 4.31 - 10.16 Thousand/uL    RBC 3.60 (L) 3.81 - 5.12 Million/uL    Hemoglobin 8.1 (L) 11.5 - 15.4 g/dL    Hematocrit 28.3 (L) 34.8 - 46.1 %    MCV 79 (L) 82 - 98 fL    MCH 22.5 (L) 26.8 - 34.3 pg    MCHC 28.6 (L) 31.4 - 37.4 g/dL    RDW 17.4 (H) 11.6 - 15.1 %    MPV 9.2 8.9 - 12.7 fL    Platelets 290 149 - 390 Thousands/uL    nRBC 0 /100 WBCs    Segmented % 65 43 - 75 %    Immature Grans % 0 0 - 2 %    Lymphocytes % 25 14 - 44 %    Monocytes % 9 4 - 12 %    Eosinophils Relative 1 0 - 6 %    Basophils Relative 0 0 - 1 %    Absolute Neutrophils 5.88 1.85 - 7.62 Thousands/µL    Absolute Immature Grans 0.03 0.00 - 0.20 Thousand/uL    Absolute Lymphocytes 2.25 0.60 - 4.47 Thousands/µL    Absolute Monocytes 0.77 0.17 - 1.22 Thousand/µL    Eosinophils Absolute 0.05 0.00 - 0.61 Thousand/µL    Basophils Absolute 0.02 0.00 - 0.10 Thousands/µL   Magnesium    Collection Time: 05/12/25  4:49 AM   Result Value Ref Range    Magnesium 1.6 (L) 1.9 - 2.7 mg/dL   Phosphorus    Collection Time: " "05/12/25  4:49 AM   Result Value Ref Range    Phosphorus 2.9 2.7 - 4.5 mg/dL   Calcium, ionized    Collection Time: 05/12/25  4:49 AM   Result Value Ref Range    Calcium, Ionized 0.98 (L) 1.12 - 1.32 mmol/L   Blood gas, venous    Collection Time: 05/12/25  4:49 AM   Result Value Ref Range    pH, Rahul 7.273 (L) 7.300 - 7.400    pCO2, Rahul 40.0 (L) 42.0 - 50.0 mm Hg    pO2, Rahul 42.6 35.0 - 45.0 mm Hg    HCO3, Rahul 18.1 (L) 24 - 30 mmol/L    Base Excess, Rahul -8.1 mmol/L    O2 Content, Rahul 8.8 ml/dL    O2 HGB, VENOUS 74.5 60.0 - 80.0 %   Acetaminophen level-\"If concentration is detectable, please discuss with medical  on call.\"    Collection Time: 05/12/25  4:49 AM   Result Value Ref Range    Acetaminophen Level 3 (L) 10 - 20 ug/mL   Comprehensive metabolic panel    Collection Time: 05/12/25  4:49 AM   Result Value Ref Range    Sodium 133 (L) 135 - 147 mmol/L    Potassium 3.6 3.5 - 5.3 mmol/L    Chloride 100 96 - 108 mmol/L    CO2 21 21 - 32 mmol/L    ANION GAP 12 4 - 13 mmol/L    BUN 10 5 - 25 mg/dL    Creatinine 0.77 0.60 - 1.30 mg/dL    Glucose 433 (HH) 65 - 140 mg/dL    Calcium 6.4 (L) 8.4 - 10.2 mg/dL    Corrected Calcium 7.4 (L) 8.3 - 10.1 mg/dL    AST 27 13 - 39 U/L    ALT 27 7 - 52 U/L    Alkaline Phosphatase 66 34 - 104 U/L    Total Protein 4.8 (L) 6.4 - 8.4 g/dL    Albumin 2.8 (L) 3.5 - 5.0 g/dL    Total Bilirubin 0.27 0.20 - 1.00 mg/dL    eGFR 87 ml/min/1.73sq m   Basic metabolic panel    Collection Time: 05/12/25  5:38 AM   Result Value Ref Range    Sodium 140 135 - 147 mmol/L    Potassium 4.1 3.5 - 5.3 mmol/L    Chloride 108 96 - 108 mmol/L    CO2 23 21 - 32 mmol/L    ANION GAP 9 4 - 13 mmol/L    BUN 11 5 - 25 mg/dL    Creatinine 0.83 0.60 - 1.30 mg/dL    Glucose 113 65 - 140 mg/dL    Calcium 7.3 (L) 8.4 - 10.2 mg/dL    eGFR 79 ml/min/1.73sq m   Magnesium    Collection Time: 05/12/25  5:38 AM   Result Value Ref Range    Magnesium 1.8 (L) 1.9 - 2.7 mg/dL   Fingerstick Glucose (POCT)    Collection " Time: 05/12/25  5:53 AM   Result Value Ref Range    POC Glucose 111 65 - 140 mg/dl   Legionella antigen, urine    Collection Time: 05/12/25  7:48 AM    Specimen: Urine, Clean Catch   Result Value Ref Range    Legionella Urinary Antigen Negative Negative   Strep Pneumoniae, Urine    Collection Time: 05/12/25  7:48 AM    Specimen: Urine, Clean Catch   Result Value Ref Range    Strep pneumoniae antigen, urine Negative Negative   Fingerstick Glucose (POCT)    Collection Time: 05/12/25 12:02 PM   Result Value Ref Range    POC Glucose 96 65 - 140 mg/dl   Fingerstick Glucose (POCT)    Collection Time: 05/12/25  5:24 PM   Result Value Ref Range    POC Glucose 186 (H) 65 - 140 mg/dl   Fingerstick Glucose (POCT)    Collection Time: 05/12/25 11:25 PM   Result Value Ref Range    POC Glucose 177 (H) 65 - 140 mg/dl   Procalcitonin, Next Day AM Collection    Collection Time: 05/13/25  5:19 AM   Result Value Ref Range    Procalcitonin 0.20 <=0.25 ng/ml   CBC and differential    Collection Time: 05/13/25  5:19 AM   Result Value Ref Range    WBC 7.79 4.31 - 10.16 Thousand/uL    RBC 3.65 (L) 3.81 - 5.12 Million/uL    Hemoglobin 8.4 (L) 11.5 - 15.4 g/dL    Hematocrit 28.4 (L) 34.8 - 46.1 %    MCV 78 (L) 82 - 98 fL    MCH 23.0 (L) 26.8 - 34.3 pg    MCHC 29.6 (L) 31.4 - 37.4 g/dL    RDW 17.5 (H) 11.6 - 15.1 %    MPV 9.1 8.9 - 12.7 fL    Platelets 297 149 - 390 Thousands/uL    nRBC 0 /100 WBCs    Segmented % 81 (H) 43 - 75 %    Immature Grans % 0 0 - 2 %    Lymphocytes % 15 14 - 44 %    Monocytes % 4 4 - 12 %    Eosinophils Relative 0 0 - 6 %    Basophils Relative 0 0 - 1 %    Absolute Neutrophils 6.28 1.85 - 7.62 Thousands/µL    Absolute Immature Grans 0.02 0.00 - 0.20 Thousand/uL    Absolute Lymphocytes 1.13 0.60 - 4.47 Thousands/µL    Absolute Monocytes 0.34 0.17 - 1.22 Thousand/µL    Eosinophils Absolute 0.01 0.00 - 0.61 Thousand/µL    Basophils Absolute 0.01 0.00 - 0.10 Thousands/µL   Comprehensive metabolic panel    Collection Time:  05/13/25  5:19 AM   Result Value Ref Range    Sodium 138 135 - 147 mmol/L    Potassium 3.9 3.5 - 5.3 mmol/L    Chloride 109 (H) 96 - 108 mmol/L    CO2 23 21 - 32 mmol/L    ANION GAP 6 4 - 13 mmol/L    BUN 11 5 - 25 mg/dL    Creatinine 0.72 0.60 - 1.30 mg/dL    Glucose 128 65 - 140 mg/dL    Calcium 8.5 8.4 - 10.2 mg/dL    AST 16 13 - 39 U/L    ALT 22 7 - 52 U/L    Alkaline Phosphatase 81 34 - 104 U/L    Total Protein 5.5 (L) 6.4 - 8.4 g/dL    Albumin 3.5 3.5 - 5.0 g/dL    Total Bilirubin 0.33 0.20 - 1.00 mg/dL    eGFR 94 ml/min/1.73sq m   Fingerstick Glucose (POCT)    Collection Time: 05/13/25  5:22 AM   Result Value Ref Range    POC Glucose 131 65 - 140 mg/dl        This note was not shared with the patient due to reasonable likelihood of causing patient harm    Note written with contributions from Jana Santiago, MS3    Eulalia Nayak,   Psychiatry Resident, PGY-2  Department of Psychiatry and Behavioral Health  [facility]    This note was completed in part utilizing voice recognizing software. Grammatical, translation, syntax errors, random word insertions, spelling mistakes, and incomplete sentences may be an occasional consequence of this system secondary to software limitations with voice recognition, ambient noise, and hardware issues. If you have any questions or concerns about the content, text, or information contained within the body of this dictation, please contact the provider for clarification.

## 2025-05-13 NOTE — ED NOTES
Patient is accepted at \[facility]\""   Patient is accepted by Dr. Maravilla   per Ashwin STAFFORD     Transportation to be  arranged with [facility] for tomorrow 5/14/2025 after 9:30 am.        Transportation is scheduled for TBD  Patient may go to the floor at 5/14/2025 after 9:30 am.          Nurse report is to be called to [phone]  prior to patient transfer.

## 2025-05-13 NOTE — ED NOTES
Per Carlotta Gibson - [facility] [place] patient is medically stable.  Copy of 302 was sent to Intake for review for in net work bed.      Also completed 302 was emailed to Malina Duke at [facility].

## 2025-05-13 NOTE — PLAN OF CARE
Problem: PHYSICAL THERAPY ADULT  Goal: Performs mobility at highest level of function for planned discharge setting.  See evaluation for individualized goals.  Description: Treatment/Interventions: Functional transfer training, LE strengthening/ROM, Therapeutic exercise, Endurance training, Cognitive reorientation, Patient/family training, Equipment eval/education, Bed mobility, Gait training, Compensatory technique education  Equipment Recommended: Walker       See flowsheet documentation for full assessment, interventions and recommendations.  5/13/2025 1504 by Cassie Starr PT  Note: Prognosis: Fair  Problem List: Decreased strength, Decreased mobility, Decreased endurance, Impaired balance, Impaired judgement, Decreased safety awareness, Pain  Assessment: Marva Salgado is a 54 y.o. Female who presents to [facility] on 5/11/25 due to overdose and diagnosis of intentional overdose. Orders for PT eval and treat received. Comorbidities affecting pt's functional mobility at time of evaluation include: DM, chronic pain, depression, HTN, toxic encephalopathy. Personal factors affecting DC include: ambulating w/ assistive device and positive fall history. At baseline, pt mobilizes mod I w/ her rollator walker w/in her house vs utilizes a WC in the community, and w/ + fall(s) in the previous 6 months. Upon evaluation, pt presents w/ the following deficits: impaired balance, decreased safety awareness, decreased endurance/activity tolerance, pain affecting mobility, and gait deviations. Pt currently requires  mod I for bed mobility, supervision for transfers, supervision w/ RW for ambulation. Pt's clinical presentation is unstable/unpredictable due to abnormal lab values, pain affecting mobility tolerance, recent h/o falls, ongoing medical management. From a PT/mobility standpoint given the above findings, DC recommendation is level: III (Minimum Rehab Resource Intensity). During current admission, pt will benefit from  continued skilled inpatient PT in the acute care setting in order to address the above deficits and to maximize function and mobility prior to DC from acute care.        Rehab Resource Intensity Level, PT: III (Minimum Resource Intensity)    See flowsheet documentation for full assessment.

## 2025-05-13 NOTE — PHYSICAL THERAPY NOTE
PHYSICAL THERAPY EVALUATION NOTE          Patient Name: Marva Salgado  Today's Date: 2025          AGE:   54 y.o.  Mrn:   [id number]  ADMIT DX:  Intentional drug overdose, initial encounter (HCC) [T50.902A]  Intentional overdose (HCC) [T50.902A]    Past Medical History:  Past Medical History:   Diagnosis Date    Arthritis     Asthma     CPAP (continuous positive airway pressure) dependence     Depression     Diabetes mellitus (HCC)     Gait disturbance     uses w/c for long distance    Gastritis     Hiatal hernia     Hx of fusion of cervical spine     Hypertension     Migraines     Morbid obesity (HCC)     PONV (postoperative nausea and vomiting)     difficulty awakening- too much CO2    Pulmonary emboli (HCC)     hx blood clot also in neck    S/P insertion of spinal cord stimulator     Sleep apnea     Tachycardia     Wears contact lenses        Past Surgical History:  Past Surgical History:   Procedure Laterality Date    COLONOSCOPY      DISCECTOMY SPINE CERVICAL POSTERIOR      HYSTERECTOMY      and bladder sling    JOINT REPLACEMENT      left TKR and partial right    NECK SURGERY      cervical fusion    MD LAPS GSTR RSTCV PX W/BYP GWYN-EN-Y LIMB <150 CM N/A 2021    Procedure: ROBOTIC BYPASS GASTRIC GWYN-EN-Y,INTRAOP EGD, PARAESOPHAGEAL HERNIA REPAIR W/ MESH;  Surgeon: Gabriel Gutierrez MD;  Location: [facility];  Service: Bariatrics    SHOULDER OPEN ROTATOR CUFF REPAIR      SPINAL CORD STIMULATOR IMPLANT       Length Of Stay: 2        PHYSICAL THERAPY EVALUATION:    Patient's identity confirmed via 2 patient identifiers (full name and ) at start of session       25 1436   PT Last Visit   PT Visit Date 25   Note Type   Note type Evaluation   Pain Assessment   Pain Assessment Tool 0-10   Pain Score 10 - Worst Possible Pain   Pain Location/Orientation Location: Head   Pain Onset/Description Descriptor: Headache   Patient's Stated Pain Goal  No pain   Hospital Pain Intervention(s) Repositioned;Ambulation/increased activity  (RN updated)   Restrictions/Precautions   Weight Bearing Precautions Per Order No   Other Precautions 1:1;Fall Risk;Pain  (in person 1:1 at bedside)   Home Living   Type of Home House  (split-level)   Home Layout Two level;Performs ADLs on one level;Able to live on main level with bedroom/bathroom;Ramped entrance  (maintains a 1st floor set-up)   Bathroom Shower/Tub Tub/shower unit   Bathroom Toilet Standard   Bathroom Equipment Shower chair   Bathroom Accessibility Accessible via walker   Home Equipment Walker;Wheelchair-manual  (rollator walker, WC)   Prior Function   Level of [place] Independent with functional mobility;Independent with ADLs   Lives With Spouse   Receives Help From Family   IADLs Independent with driving;Independent with meal prep;Independent with medication management   Falls in the last 6 months   (at least 3 per pt - attributes to POTS, impaired balance)   Comments Pt amb mod I w/ rollator walker, uses manual WC for longer/community distances (has a WC in her car)   General   Family/Caregiver Present No   Cognition   Arousal/Participation Cooperative   Orientation Level Oriented to person;Oriented to place;Oriented to time   Following Commands Follows one step commands without difficulty   Comments Pt ID via name and ; pt agreeable to PT eval and mobility, flat affect throughout   RLE Assessment   RLE Assessment WFL  (grossly assessed w/ functional mobility, at least 3+/5)   LLE Assessment   LLE Assessment WFL  (grossly assessed w/ functional mobility, at least 3+/5)   Vision-Basic Assessment   Current Vision Wears glasses all the time   Bed Mobility   Supine to Sit 6  Modified independent   Additional items Bedrails   Sit to Supine 6  Modified independent   Additional items Bedrails   Additional Comments able to maintain sitting balance at EOB w/ supervision   Transfers   Sit to Stand 5  Supervision    Additional items Assist x 1;Impulsive   Stand to Sit 5  Supervision   Additional items Assist x 1;Impulsive   Ambulation/Elevation   Gait pattern Improper Weight shift;Decreased foot clearance;Short stride;Excessively slow   Gait Assistance 5  Supervision   Additional items Assist x 1;Verbal cues   Assistive Device Rolling walker  (RW, pt carried RW while negotiating turns)   Distance 15'   Balance   Static Sitting Good   Dynamic Sitting Fair +   Static Standing Fair   Ambulatory Fair -  (w/ RW)   Activity Tolerance   Activity Tolerance Patient limited by fatigue;Patient limited by pain   Medical Staff Made Aware Pt benefited from PT/OT care coordination w/ OT Marjorie due to cognitive/behavioral impairments affecting functional mobility and allow for challenge of pt's activity tolerance, PT and OT goals were addressed individually during session; AYE Laguerre   Nurse Made Aware EDWIN Wright   Prognosis Fair   Problem List Decreased strength;Decreased mobility;Decreased endurance;Impaired balance;Impaired judgement;Decreased safety awareness;Pain   Assessment Marva Salgado is a 54 y.o. Female who presents to [facility] on 5/11/25 due to overdose and diagnosis of intentional overdose. Orders for PT eval and treat received. Comorbidities affecting pt's functional mobility at time of evaluation include: DM, chronic pain, depression, HTN, toxic encephalopathy. Personal factors affecting DC include: ambulating w/ assistive device and positive fall history. At baseline, pt mobilizes mod I w/ her rollator walker w/in her house vs utilizes a WC in the community, and w/ + fall(s) in the previous 6 months. Upon evaluation, pt presents w/ the following deficits: impaired balance, decreased safety awareness, decreased endurance/activity tolerance, pain affecting mobility, and gait deviations. Pt currently requires  mod I for bed mobility, supervision for transfers, supervision w/ RW for ambulation. Pt's clinical  presentation is unstable/unpredictable due to abnormal lab values, pain affecting mobility tolerance, recent h/o falls, ongoing medical management. From a PT/mobility standpoint given the above findings, DC recommendation is level: III (Minimum Rehab Resource Intensity). During current admission, pt will benefit from continued skilled inpatient PT in the acute care setting in order to address the above deficits and to maximize function and mobility prior to DC from acute care.   Goals   [facility] Expiration Date 05/23/25   Short Term Goal #1 Pt will: perform bed mobility independently to decrease pt's burden of care and increase pt's independence w/ repositioning in bed; perform transfers w/ mod I to promote OOB mobility; ambulate 150' w/ LRAD and mod I to increase pt's ambulatory endurance/tolerance; increase all balance ratings by at least 1 grade to decrease pt's risk of falls   PT Treatment Day 0   Plan   Treatment/Interventions Functional transfer training;LE strengthening/ROM;Therapeutic exercise;Endurance training;Cognitive reorientation;Patient/family training;Equipment eval/education;Bed mobility;Gait training;Compensatory technique education   PT Frequency 2-3x/wk   Discharge Recommendation   Rehab Resource Intensity Level, PT III (Minimum Resource Intensity)   Equipment Recommended Walker   AM-PAC Basic Mobility Inpatient   Turning in Flat Bed Without Bedrails 4   Lying on Back to Sitting on Edge of Flat Bed Without Bedrails 4   Moving Bed to Chair 3   Standing Up From Chair Using Arms 3   Walk in Room 3   Climb 3-5 Stairs With Railing 2   Basic Mobility Inpatient Raw Score 19   Basic Mobility Standardized Score 42.48   [facility] Highest Level Of Mobility   -HLM Goal 6: Walk 10 steps or more   -HLM Achieved 6: Walk 10 steps or more   End of Consult   Patient Position at End of Consult Supine  (1:1 present in room)       The patient's AM-PAC Basic Mobility Inpatient Short Form Raw Score is 19. A Raw score  of greater than 16 suggests the patient may benefit from discharge to home. Please also refer to the recommendation of the Physical Therapist for safe discharge planning.    Pt will benefit from skilled inpatient PT during this admission in order to facilitate progress towards goals and to maximize functional independence prior to DC      DC rec: level III (Minimum Rehab Resource Intensity), from a PT/mobility standpoint        Cassie Starr, PT, DPT  05/13/25        k

## 2025-05-13 NOTE — PLAN OF CARE
Problem: OCCUPATIONAL THERAPY ADULT  Goal: Performs self-care activities at highest level of function for planned discharge setting.  See evaluation for individualized goals.  Description: Treatment Interventions: ADL retraining, Functional transfer training, Endurance training, Patient/family training, Equipment evaluation/education, Activityengagement          See flowsheet documentation for full assessment, interventions and recommendations.   Note: Limitation: Decreased ADL status, Decreased endurance, Decreased self-care trans, Decreased high-level ADLs, Mood limitation, Decreased Safe judgement during ADL (pain, safety awareness, emotional regulation)  Prognosis: Good  Assessment: Patient is a 54 y.o. female seen for OT evaluation at [facility] following admission on 5/11/2025  s/p Intentional overdose (HCC). Please see above for comprehensive list of comorbidities and significant PMHx impacting functional performance.  Upon initial evaluation, pt appears to be performing below baseline functional status.   Occupational performance is affected by the following deficits: endurance ,  decreased muscular strength , decreased balance , impaired judgement and problem solving , decreased emotional regulation and coping skills , and impaired safety awareness . Personal/Environmental factors impacting D/C include: current 302 to IP psych. Supporting factors include: accessible home environment Patient would benefit from OT services within the acute care setting to maximize level of functional independence in the following areas self-care transfers, functional mobility, and ADLs.  From OT standpoint, recommendation at time of D/C would be level 4: no rehabilitation needs .     Rehab Resource Intensity Level, OT: No post-acute rehabilitation needs

## 2025-05-13 NOTE — ASSESSMENT & PLAN NOTE
Patient is having suicidal ideation and intent.  Plan to shoot discharge the patient to inpatient psych facility when bed available.  302 form filled by Psychiatry and Medicine and given to crisis worker.

## 2025-05-13 NOTE — DISCHARGE SUMMARY
Discharge Summary - [facility] Internal Medicine    Patient Information: Marva Salgado 54 y.o. female MRN: [id number]  Unit/Bed#: S -01 Encounter: [id number]    Discharging Physician / Practitioner: Gorge Coe MD  PCP: Santi Hendricks MD  Admission Date: 5/11/2025  Discharge Date: 05/13/25    Reason for Admission: Overdose - Intentional (Pt tried To OD on her prescription medication. Left a note for the . /)      Discharge Diagnoses:     Primary Discharge Diagnosis:     Principal Problem:    Intentional overdose (HCC)  Active Problems:    Obesity, Class II, BMI 35-39.9    Type 2 diabetes mellitus without complication, without long-term current use of insulin (HCC)    Obstructive sleep apnea syndrome    H/O gastric bypass    h/o Adrenal insufficiency (HCC)    Urinary tract infection    Acute respiratory failure with hypoxia and hypercapnia (HCC)    Encephalopathy, toxic    Metabolic acidosis    Severe sepsis (HCC)    Chronic pain    Depression  Resolved Problems:    * No resolved hospital problems. *      Consultations During Hospital Stay:  IP CONSULT TO CASE MANAGEMENT  IP CONSULT TO PSYCHIATRY  IP CONSULT TO TOXICOLOGY  IP CONSULT TO PHARMACY  IP CONSULT TO ED CRISIS WORKER    Procedures Performed:     Significant Findings:   Refer to hospital course and above listed diagnosis related plan for details    Imaging while in hospital:  Chest x-ray  Incidental Findings:     Test Results Pending at Discharge (will require follow up):   As per After Visit Summary     Outpatient Tests Requested:  Complications:  Refer to hospital course and above listed diagnosis related plan, if any    Hospital Course:   54-year-old female with past medical history of depression, anxiety, type 2 diabetes, CHIDI, adrenal insufficiency, asthma, history of chronic migraine and chronic pain syndrome had intentional drug overdose with multiple medications including gabapentin, Dilaudid, codeine, Tylenol, Robaxin,  "Klonopin and Cymbalta and admitted for intentional drug overdose and altered mental status.  Patient also commented and texted her friends and family goodbye prior to taking intentional drug overdose.  Patient was monitored in the ICU, and treated with IV fluids.  Toxicology was consulted and initially patient was started NAC but then was discontinued.  Patient was stable and moved out of the ICU.  Currently on the regular floors.  Doing well.  Patient currently is medically stable to be discharged to inpatient psych facility when bed available.  Patient is not wanting to voluntarily go to inpatient psych facility.  Psychiatry consulted  Patient was on one-to-one observation and then was 302d as per psych recommendations.    Patient was medically stable and eventually discharged to [facility] inpatient psych facility on 5/14/2025.        Please see above list of diagnoses and related plan for additional information.       Condition at Discharge: fair     Discharge Day Visit / Exam:     Subjective:  I have seen and examined the patient at bedside. Overnight events reviewed with the RN.     Vitals: Blood Pressure: 146/91 (05/13/25 1441)  Pulse: 99 (05/13/25 1441)  Temperature: 98.5 °F (36.9 °C) (05/13/25 0759)  Temp Source: Oral (05/13/25 0759)  Respirations: 18 (05/13/25 0759)  Height: 5' 6\" (167.6 cm) (05/12/25 0203)  Weight - Scale: 85.6 kg (188 lb 11.4 oz) (05/13/25 0534)  SpO2: 94 % (05/13/25 1441)  Exam:   Physical Exam  General Exam: Alert and Oriented x 3, NAD  Eyes: MASSIEL  Neck: Supple.   CVS: S1, S2 [place], RRR.   R/S: Clear to auscultate anteriorly.   Abd: Soft, NT, ND, BS+ve  Extremities: No edema noted.   Skin: No acute Rash noted.   CNS: No acute FND. Moves all 4 extremities.   Psych: Co-operative, Not agitated.     Discharge instructions/Information to patient and family:(Discharge Medications and Follow up):   See after visit summary for information provided to patient and family.      Provisions for " "Follow-Up Care:  See after visit summary for information related to follow-up care and any pertinent home health orders.      Disposition: [facility].    Planned Readmission:  Yes     Discharge Statement:  I spent 35 minutes discharging the patient. This time was spent on the day of discharge. I had direct contact with the patient on the day of discharge. Greater than 50% of the total time was spent examining patient, answering all patient questions, arranging and discussing plan of care with patient as well as directly providing post-discharge instructions.  Additional time then spent on discharge activities.    Discharge Medications:  See after visit summary for reconciled discharge medications provided to patient and family.      ** Please Note: \"This note has been constructed using a voice recognition system.Therefore there may be syntax, spelling, and/or grammatical errors. Please call if you have any questions. \"**        "

## 2025-05-14 ENCOUNTER — HOSPITAL ENCOUNTER (INPATIENT)
Facility: HOSPITAL | Age: 55
LOS: 5 days | Discharge: HOME | DRG: 885 | End: 2025-05-19
Attending: STUDENT IN AN ORGANIZED HEALTH CARE EDUCATION/TRAINING PROGRAM | Admitting: PSYCHIATRY & NEUROLOGY
Payer: COMMERCIAL

## 2025-05-14 VITALS
TEMPERATURE: 98.1 F | BODY MASS INDEX: 30.05 KG/M2 | HEART RATE: 99 BPM | SYSTOLIC BLOOD PRESSURE: 118 MMHG | HEIGHT: 66 IN | DIASTOLIC BLOOD PRESSURE: 65 MMHG | OXYGEN SATURATION: 94 % | RESPIRATION RATE: 19 BRPM | WEIGHT: 186.95 LBS

## 2025-05-14 DIAGNOSIS — G43.711 INTRACTABLE CHRONIC MIGRAINE WITHOUT AURA AND WITH STATUS MIGRAINOSUS: ICD-10-CM

## 2025-05-14 DIAGNOSIS — F33.1 MODERATE EPISODE OF RECURRENT MAJOR DEPRESSIVE DISORDER (HCC): Primary | Chronic | ICD-10-CM

## 2025-05-14 DIAGNOSIS — E27.40 ADRENAL INSUFFICIENCY (HCC): ICD-10-CM

## 2025-05-14 DIAGNOSIS — T50.902A INTENTIONAL DRUG OVERDOSE, INITIAL ENCOUNTER (HCC): ICD-10-CM

## 2025-05-14 LAB
ATRIAL RATE: 99 BPM
ATRIAL RATE: 99 BPM
GLUCOSE SERPL-MCNC: 90 MG/DL (ref 65–140)
GLUCOSE SERPL-MCNC: 97 MG/DL (ref 65–140)
GLUCOSE SERPL-MCNC: 98 MG/DL (ref 65–140)
P AXIS: 43 DEGREES
P AXIS: 53 DEGREES
PR INTERVAL: 122 MS
PR INTERVAL: 124 MS
QRS AXIS: 29 DEGREES
QRS AXIS: 57 DEGREES
QRSD INTERVAL: 84 MS
QRSD INTERVAL: 88 MS
QT INTERVAL: 354 MS
QT INTERVAL: 364 MS
QTC INTERVAL: 454 MS
QTC INTERVAL: 467 MS
T WAVE AXIS: -7 DEGREES
T WAVE AXIS: 13 DEGREES
VENTRICULAR RATE: 99 BPM
VENTRICULAR RATE: 99 BPM

## 2025-05-14 PROCEDURE — 82948 REAGENT STRIP/BLOOD GLUCOSE: CPT

## 2025-05-14 PROCEDURE — 93010 ELECTROCARDIOGRAM REPORT: CPT | Performed by: INTERNAL MEDICINE

## 2025-05-14 RX ORDER — HALOPERIDOL 1 MG/1
1 TABLET ORAL EVERY 6 HOURS PRN
Status: DISCONTINUED | OUTPATIENT
Start: 2025-05-14 | End: 2025-05-19 | Stop reason: HOSPADM

## 2025-05-14 RX ORDER — DIPHENHYDRAMINE HCL 25 MG
25 TABLET ORAL ONCE
Status: COMPLETED | OUTPATIENT
Start: 2025-05-14 | End: 2025-05-14

## 2025-05-14 RX ORDER — HYDROXYZINE HYDROCHLORIDE 25 MG/1
25 TABLET, FILM COATED ORAL
Status: DISCONTINUED | OUTPATIENT
Start: 2025-05-14 | End: 2025-05-19 | Stop reason: HOSPADM

## 2025-05-14 RX ORDER — IBUPROFEN 800 MG/1
800 TABLET, FILM COATED ORAL EVERY 8 HOURS PRN
Status: DISCONTINUED | OUTPATIENT
Start: 2025-05-14 | End: 2025-05-15

## 2025-05-14 RX ORDER — HYDROXYZINE HYDROCHLORIDE 50 MG/1
100 TABLET, FILM COATED ORAL
Status: DISCONTINUED | OUTPATIENT
Start: 2025-05-14 | End: 2025-05-19 | Stop reason: HOSPADM

## 2025-05-14 RX ORDER — BUTALBITAL, ACETAMINOPHEN AND CAFFEINE 50; 325; 40 MG/1; MG/1; MG/1
1 TABLET ORAL EVERY 4 HOURS PRN
Status: DISCONTINUED | OUTPATIENT
Start: 2025-05-14 | End: 2025-05-14

## 2025-05-14 RX ORDER — INSULIN LISPRO 100 [IU]/ML
1-5 INJECTION, SOLUTION INTRAVENOUS; SUBCUTANEOUS
Status: DISCONTINUED | OUTPATIENT
Start: 2025-05-14 | End: 2025-05-19 | Stop reason: HOSPADM

## 2025-05-14 RX ORDER — MAGNESIUM HYDROXIDE/ALUMINUM HYDROXICE/SIMETHICONE 120; 1200; 1200 MG/30ML; MG/30ML; MG/30ML
30 SUSPENSION ORAL EVERY 4 HOURS PRN
Status: DISCONTINUED | OUTPATIENT
Start: 2025-05-14 | End: 2025-05-19 | Stop reason: HOSPADM

## 2025-05-14 RX ORDER — HALOPERIDOL 5 MG/1
5 TABLET ORAL
Status: DISCONTINUED | OUTPATIENT
Start: 2025-05-14 | End: 2025-05-19 | Stop reason: HOSPADM

## 2025-05-14 RX ORDER — GABAPENTIN 300 MG/1
300 CAPSULE ORAL 3 TIMES DAILY
Status: DISCONTINUED | OUTPATIENT
Start: 2025-05-14 | End: 2025-05-19 | Stop reason: HOSPADM

## 2025-05-14 RX ORDER — ATORVASTATIN CALCIUM 20 MG/1
20 TABLET, FILM COATED ORAL
Status: DISCONTINUED | OUTPATIENT
Start: 2025-05-14 | End: 2025-05-19 | Stop reason: HOSPADM

## 2025-05-14 RX ORDER — BISACODYL 10 MG
10 SUPPOSITORY, RECTAL RECTAL DAILY PRN
Status: DISCONTINUED | OUTPATIENT
Start: 2025-05-14 | End: 2025-05-19 | Stop reason: HOSPADM

## 2025-05-14 RX ORDER — BENZTROPINE MESYLATE 1 MG/1
1 TABLET ORAL
Status: DISCONTINUED | OUTPATIENT
Start: 2025-05-14 | End: 2025-05-19 | Stop reason: HOSPADM

## 2025-05-14 RX ORDER — POLYETHYLENE GLYCOL 3350 17 G/17G
17 POWDER, FOR SOLUTION ORAL DAILY PRN
Status: DISCONTINUED | OUTPATIENT
Start: 2025-05-14 | End: 2025-05-19 | Stop reason: HOSPADM

## 2025-05-14 RX ORDER — TRAZODONE HYDROCHLORIDE 50 MG/1
50 TABLET ORAL
Status: DISCONTINUED | OUTPATIENT
Start: 2025-05-14 | End: 2025-05-18

## 2025-05-14 RX ORDER — BUTALBITAL, ACETAMINOPHEN AND CAFFEINE 50; 325; 40 MG/1; MG/1; MG/1
2 TABLET ORAL ONCE
Status: COMPLETED | OUTPATIENT
Start: 2025-05-14 | End: 2025-05-14

## 2025-05-14 RX ORDER — PROPRANOLOL HYDROCHLORIDE 10 MG/1
10 TABLET ORAL EVERY 8 HOURS PRN
Status: DISCONTINUED | OUTPATIENT
Start: 2025-05-14 | End: 2025-05-19 | Stop reason: HOSPADM

## 2025-05-14 RX ORDER — ONDANSETRON 4 MG/1
4 TABLET, ORALLY DISINTEGRATING ORAL ONCE
Status: COMPLETED | OUTPATIENT
Start: 2025-05-14 | End: 2025-05-14

## 2025-05-14 RX ORDER — DULOXETIN HYDROCHLORIDE 30 MG/1
90 CAPSULE, DELAYED RELEASE ORAL EVERY EVENING
Status: DISCONTINUED | OUTPATIENT
Start: 2025-05-14 | End: 2025-05-17

## 2025-05-14 RX ORDER — LORAZEPAM 2 MG/ML
1 INJECTION INTRAMUSCULAR
Status: DISCONTINUED | OUTPATIENT
Start: 2025-05-14 | End: 2025-05-19 | Stop reason: HOSPADM

## 2025-05-14 RX ORDER — IBUPROFEN 600 MG/1
600 TABLET, FILM COATED ORAL EVERY 6 HOURS PRN
Status: DISCONTINUED | OUTPATIENT
Start: 2025-05-14 | End: 2025-05-15

## 2025-05-14 RX ORDER — LORAZEPAM 2 MG/ML
2 INJECTION INTRAMUSCULAR EVERY 6 HOURS PRN
Status: DISCONTINUED | OUTPATIENT
Start: 2025-05-14 | End: 2025-05-19 | Stop reason: HOSPADM

## 2025-05-14 RX ORDER — BENZTROPINE MESYLATE 1 MG/ML
1 INJECTION, SOLUTION INTRAMUSCULAR; INTRAVENOUS
Status: DISCONTINUED | OUTPATIENT
Start: 2025-05-14 | End: 2025-05-19 | Stop reason: HOSPADM

## 2025-05-14 RX ORDER — FERROUS GLUCONATE 324(38)MG
324 TABLET ORAL
Status: DISCONTINUED | OUTPATIENT
Start: 2025-05-15 | End: 2025-05-15

## 2025-05-14 RX ORDER — HALOPERIDOL 5 MG/1
2.5 TABLET ORAL
Status: DISCONTINUED | OUTPATIENT
Start: 2025-05-14 | End: 2025-05-19 | Stop reason: HOSPADM

## 2025-05-14 RX ORDER — LORAZEPAM 2 MG/ML
2 INJECTION INTRAMUSCULAR
Status: DISCONTINUED | OUTPATIENT
Start: 2025-05-14 | End: 2025-05-19 | Stop reason: HOSPADM

## 2025-05-14 RX ORDER — AMOXICILLIN 250 MG
1 CAPSULE ORAL DAILY PRN
Status: DISCONTINUED | OUTPATIENT
Start: 2025-05-14 | End: 2025-05-19 | Stop reason: HOSPADM

## 2025-05-14 RX ORDER — HALOPERIDOL 5 MG/ML
5 INJECTION INTRAMUSCULAR
Status: DISCONTINUED | OUTPATIENT
Start: 2025-05-14 | End: 2025-05-19 | Stop reason: HOSPADM

## 2025-05-14 RX ORDER — DIPHENHYDRAMINE HYDROCHLORIDE 50 MG/ML
50 INJECTION, SOLUTION INTRAMUSCULAR; INTRAVENOUS EVERY 6 HOURS PRN
Status: DISCONTINUED | OUTPATIENT
Start: 2025-05-14 | End: 2025-05-19 | Stop reason: HOSPADM

## 2025-05-14 RX ORDER — PANTOPRAZOLE SODIUM 20 MG/1
20 TABLET, DELAYED RELEASE ORAL
Status: DISCONTINUED | OUTPATIENT
Start: 2025-05-14 | End: 2025-05-19 | Stop reason: HOSPADM

## 2025-05-14 RX ORDER — IBUPROFEN 400 MG/1
400 TABLET, FILM COATED ORAL EVERY 4 HOURS PRN
Status: DISCONTINUED | OUTPATIENT
Start: 2025-05-14 | End: 2025-05-15

## 2025-05-14 RX ORDER — HALOPERIDOL 5 MG/ML
2.5 INJECTION INTRAMUSCULAR
Status: DISCONTINUED | OUTPATIENT
Start: 2025-05-14 | End: 2025-05-19 | Stop reason: HOSPADM

## 2025-05-14 RX ORDER — BENZTROPINE MESYLATE 1 MG/ML
0.5 INJECTION, SOLUTION INTRAMUSCULAR; INTRAVENOUS
Status: DISCONTINUED | OUTPATIENT
Start: 2025-05-14 | End: 2025-05-19 | Stop reason: HOSPADM

## 2025-05-14 RX ORDER — HYDROXYZINE HYDROCHLORIDE 50 MG/1
50 TABLET, FILM COATED ORAL
Status: DISCONTINUED | OUTPATIENT
Start: 2025-05-14 | End: 2025-05-19 | Stop reason: HOSPADM

## 2025-05-14 RX ORDER — GABAPENTIN 400 MG/1
1200 CAPSULE ORAL
Status: DISCONTINUED | OUTPATIENT
Start: 2025-05-14 | End: 2025-05-19 | Stop reason: HOSPADM

## 2025-05-14 RX ORDER — SUMATRIPTAN 50 MG/1
50 TABLET, FILM COATED ORAL ONCE
Status: DISCONTINUED | OUTPATIENT
Start: 2025-05-14 | End: 2025-05-14

## 2025-05-14 RX ADMIN — PANTOPRAZOLE SODIUM 20 MG: 20 TABLET, DELAYED RELEASE ORAL at 16:57

## 2025-05-14 RX ADMIN — GABAPENTIN 300 MG: 300 CAPSULE ORAL at 16:57

## 2025-05-14 RX ADMIN — ATORVASTATIN CALCIUM 20 MG: 20 TABLET, FILM COATED ORAL at 16:57

## 2025-05-14 RX ADMIN — METOCLOPRAMIDE 10 MG: 10 TABLET ORAL at 02:41

## 2025-05-14 RX ADMIN — GABAPENTIN 300 MG: 300 CAPSULE ORAL at 11:48

## 2025-05-14 RX ADMIN — HEPARIN SODIUM 5000 UNITS: 5000 INJECTION INTRAVENOUS; SUBCUTANEOUS at 06:39

## 2025-05-14 RX ADMIN — RIMEGEPANT SULFATE 75 MG: 75 TABLET, ORALLY DISINTEGRATING ORAL at 08:31

## 2025-05-14 RX ADMIN — PANTOPRAZOLE SODIUM 20 MG: 20 TABLET, DELAYED RELEASE ORAL at 06:38

## 2025-05-14 RX ADMIN — IBUPROFEN 800 MG: 800 TABLET, FILM COATED ORAL at 17:00

## 2025-05-14 RX ADMIN — BUTALBITAL, ACETAMINOPHEN, AND CAFFEINE 2 TABLET: 50; 325; 40 TABLET ORAL at 23:31

## 2025-05-14 RX ADMIN — GABAPENTIN 1200 MG: 400 CAPSULE ORAL at 21:36

## 2025-05-14 RX ADMIN — GABAPENTIN 300 MG: 300 CAPSULE ORAL at 08:31

## 2025-05-14 RX ADMIN — RIMEGEPANT SULFATE 75 MG: 75 TABLET, ORALLY DISINTEGRATING ORAL at 11:48

## 2025-05-14 RX ADMIN — Medication 3 MG: at 21:37

## 2025-05-14 RX ADMIN — GABAPENTIN 300 MG: 300 CAPSULE ORAL at 21:37

## 2025-05-14 RX ADMIN — DULOXETINE HYDROCHLORIDE 90 MG: 30 CAPSULE, DELAYED RELEASE ORAL at 17:01

## 2025-05-14 RX ADMIN — METHOCARBAMOL TABLETS 500 MG: 500 TABLET, COATED ORAL at 02:45

## 2025-05-14 RX ADMIN — DIPHENHYDRAMINE HYDROCHLORIDE 25 MG: 25 TABLET ORAL at 22:28

## 2025-05-14 RX ADMIN — ONDANSETRON 4 MG: 4 TABLET, ORALLY DISINTEGRATING ORAL at 22:28

## 2025-05-14 RX ADMIN — FERROUS SULFATE TAB 325 MG (65 MG ELEMENTAL FE) 325 MG: 325 (65 FE) TAB at 08:31

## 2025-05-14 NOTE — PLAN OF CARE
Problem: PAIN - ADULT  Goal: Verbalizes/displays adequate comfort level or baseline comfort level  Description: Interventions:  - Encourage patient to monitor pain and request assistance  - Assess pain using appropriate pain scale  - Administer analgesics as ordered based on type and severity of pain and evaluate response  - Implement non-pharmacological measures as appropriate and evaluate response  - Consider cultural and social influences on pain and pain management  - Notify physician/advanced practitioner if interventions unsuccessful or patient reports new pain  - Educate patient/family on pain management process including their role and importance of  reporting pain   - Provide non-pharmacologic/complimentary pain relief interventions  Outcome: Progressing     Problem: INFECTION - ADULT  Goal: Absence or prevention of progression during hospitalization  Description: INTERVENTIONS:  - Assess and monitor for signs and symptoms of infection  - Monitor lab/diagnostic results  - Monitor all insertion sites, i.e. indwelling lines, tubes, and drains  - Monitor endotracheal if appropriate and nasal secretions for changes in amount and color  - [place] appropriate cooling/warming therapies per order  - Administer medications as ordered  - Instruct and encourage patient and family to use good hand hygiene technique  - Identify and instruct in appropriate isolation precautions for identified infection/condition  Outcome: Progressing  Goal: Absence of fever/infection during neutropenic period  Description: INTERVENTIONS:  - Monitor WBC  - Perform strict hand hygiene  - Limit to healthy visitors only  - No plants, dried, fresh or silk flowers with tubbs in patient room  Outcome: Progressing     Problem: SAFETY ADULT  Goal: Patient will remain free of falls  Description: INTERVENTIONS:  - Educate patient/family on patient safety including physical limitations  - Instruct patient to call for assistance with activity   -  Consider consulting OT/PT to assist with strengthening/mobility based on AM PAC & JH-HLM score  - Consult OT/PT to assist with strengthening/mobility   - Keep Call bell within reach  - Keep bed low and locked with side rails adjusted as appropriate  - Keep care items and personal belongings within reach  - Initiate and maintain comfort rounds  - Make Fall Risk Sign visible to staff  - Offer Toileting every  Hours, in advance of need  - Initiate/Maintain alarm  - Obtain necessary fall risk management equipment:   - Apply yellow socks and bracelet for high fall risk patients  - Consider moving patient to room near nurses station  Outcome: Progressing  Goal: Maintain or return to baseline ADL function  Description: INTERVENTIONS:  -  Assess patient's ability to carry out ADLs; assess patient's baseline for ADL function and identify physical deficits which impact ability to perform ADLs (bathing, care of mouth/teeth, toileting, grooming, dressing, etc.)  - Assess/evaluate cause of self-care deficits   - Assess range of motion  - Assess patient's mobility; develop plan if impaired  - Assess patient's need for assistive devices and provide as appropriate  - Encourage maximum independence but intervene and supervise when necessary  - Involve family in performance of ADLs  - Assess for home care needs following discharge   - Consider OT consult to assist with ADL evaluation and planning for discharge  - Provide patient education as appropriate  - Monitor functional capacity and physical performance, use of AM PAC & JH-HLM   - Monitor gait, balance and fatigue with ambulation    Outcome: Progressing  Goal: Maintains/Returns to pre admission functional level  Description: INTERVENTIONS:  - Perform AM-PAC 6 Click Basic Mobility/ Daily Activity assessment daily.  - Set and communicate daily mobility goal to care team and patient/family/caregiver.   - Collaborate with rehabilitation services on mobility goals if consulted  -  Perform Range of Motion  times a day.  - Reposition patient every  hours.  - Dangle patient  times a day  - Stand patient  times a day  - Ambulate patient  times a day  - Out of bed to chair  times a day   - Out of bed for meals  times a day  - Out of bed for toileting  - Record patient progress and toleration of activity level   Outcome: Progressing     Problem: Knowledge Deficit  Goal: Patient/family/caregiver demonstrates understanding of disease process, treatment plan, medications, and discharge instructions  Description: Complete learning assessment and assess knowledge base.  Interventions:  - Provide teaching at level of understanding  - Provide teaching via preferred learning methods  Outcome: Progressing     Problem: DEPRESSION  Goal: Will be euthymic at discharge  Description: INTERVENTIONS:  - Administer medication as ordered  - Provide emotional support via 1:1 interaction with staff  - Encourage involvement in milieu/groups/activities  - Monitor for social isolation  Outcome: Progressing     Problem: ANXIETY  Goal: Will report anxiety at manageable levels  Description: INTERVENTIONS:  - Administer medication as ordered  - Teach and encourage coping skills  - Provide emotional support  - Assess patient/family for anxiety and ability to cope  Outcome: Progressing  Goal: By discharge: Patient will verbalize 2 strategies to deal with anxiety  Description: Interventions:  - Identify any obvious source/trigger to anxiety  - Staff will assist patient in applying identified coping technique/skills  - Encourage attendance of scheduled groups and activities  Outcome: Progressing     Problem: INVOLUNTARY ADMIT  Goal: Will cooperate with staff recommendations and doctor's orders and will demonstrate appropriate behavior  Description: INTERVENTIONS:  - Treat underlying conditions and offer medication as ordered  - Educate regarding involuntary admission procedures and rules  - Utilize positive consistent limit  setting strategies to support patient and staff safety  Outcome: Progressing

## 2025-05-14 NOTE — ED NOTES
Insurance Authorization for admission:   Phone call placed to [facility].  Phone number: [phone].     Spoke to Bhumi.     5 days approved.  Level of care: 302.  Review on 5/19.   Authorization # [id number].        Please fax discharge clinical to [phone].    Eligibility Verification System checked - [phone]).  Online system / automated system indicates:Not eligible.

## 2025-05-14 NOTE — NURSING NOTE
Bin:   Shampoo cap   Black  white base   Black phone   Fan   Tse shorts   [facility] sleep mask   Light blue tank top  [facility] note  2 water bottles    Bedside:   [employer] t shirt

## 2025-05-14 NOTE — UTILIZATION REVIEW
Continued Stay Review    Date: 5/13/25                          Current Patient Class: Inpatient  Current Level of Care: Med Surg    HPI:54 y.o. female initially admitted on 5/11     Current Diagnosis: Intentional overdose    Assessment/Plan: Patient currently is medically stable to be discharged to inpatient psych facility when bed available. Patient is not wanting to voluntarily go to inpatient psych facility. Psychiatry consulted. Patient is on one-to-one observation and will be 302d as per psych recommendations.    Medications:   Scheduled Medications:  No current facility-administered medications for this encounter.    Continuous IV Infusions:  No current facility-administered medications for this encounter.    PRN Meds:  No current facility-administered medications for this encounter.    Discharge Plan: TBD    Vital Signs (last 3 days) before discharge       Date/Time Temp Pulse Resp BP MAP (mmHg) SpO2 O2 Flow Rate (L/min) O2 Device O2 Interface Device Patient Position - Orthostatic VS Henny Coma Scale Score Pain    05/14/25 0800 -- -- -- -- -- -- -- -- -- -- 15 7    05/14/25 07:14:10 98.1 °F (36.7 °C) -- -- 118/65 83 -- -- -- -- -- -- --    05/13/25 2330 -- -- -- -- -- -- -- -- -- -- 15 No Pain    05/13/25 22:27:35 -- -- 19 146/93 111 -- -- -- -- -- -- --    05/13/25 2057 -- -- -- -- -- -- -- -- -- -- -- 8    05/13/25 14:41:27 -- 99 -- 146/91 109 94 % -- None (Room air) -- Lying -- --    05/13/25 1437 -- -- -- -- -- -- -- -- -- -- -- 9    05/13/25 1436 -- -- -- -- -- -- -- -- -- -- -- 10 - Worst Possible Pain    05/13/25 07:59:07 98.5 °F (36.9 °C) 88 18 118/81 93 96 % -- None (Room air) -- Lying -- --    05/12/25 23:20:37 98.5 °F (36.9 °C) 104 -- 109/72 84 95 % -- -- -- -- -- --    05/12/25 2100 -- -- -- -- -- -- -- -- -- -- 15 8    05/12/25 1452 98 °F (36.7 °C) 95 19 144/66 85 92 % -- None (Room air) -- Lying -- --    05/12/25 1200 -- -- -- -- -- -- -- -- -- -- 15 --    05/12/25 0830 -- 99 20 131/74 98 96  % -- -- -- -- -- --    05/12/25 0815 -- 102 18 114/62 84 97 % -- -- -- -- -- --    05/12/25 0814 -- -- -- -- -- -- -- -- -- -- -- 8    05/12/25 0800 -- 102 20 129/60 87 98 % -- -- -- -- 15 --    05/12/25 0745 -- 108 32 -- -- 96 % -- -- -- -- -- --    05/12/25 0730 -- 79 11 158/70 101 100 % -- -- -- -- -- --    05/12/25 0721 -- 76 22 150/71 104 100 % -- None (Room air) -- -- -- No Pain    05/12/25 0715 -- 75 22 -- -- 100 % -- -- -- -- -- --    05/12/25 0700 -- -- -- -- -- -- -- -- -- -- 15 --    05/12/25 0645 -- 74 22 161/74 106 100 % -- -- -- -- -- --    05/12/25 0630 -- 76 22 153/77 110 100 % -- -- -- -- -- --    05/12/25 0615 -- 74 22 147/77 106 100 % -- -- -- -- -- --    05/12/25 0600 -- 73 22 148/78 106 100 % -- -- -- -- 15 --    05/12/25 0530 -- 75 22 149/81 109 100 % -- -- -- -- -- --    05/12/25 0500 -- 78 22 158/80 112 100 % -- -- -- -- 15 --    05/12/25 0430 -- 72 -- 145/72 103 100 % -- -- -- -- -- --    05/12/25 0400 -- 73 -- 152/75 106 100 % -- -- -- -- 15 --    05/12/25 0300 -- 80 22 139/79 105 100 % -- -- -- -- 14 --    05/12/25 0214 -- -- -- -- -- 100 % -- -- Full face mask -- -- --    05/12/25 0200 -- 81 15 139/71 99 100 % -- -- -- -- 14 --    05/12/25 0145 -- 78 -- 151/80 108 100 % -- -- -- -- -- --    05/12/25 0130 -- 87 20 145/79 106 100 % -- -- -- -- -- --    05/12/25 0115 -- 81 20 159/89 115 100 % -- -- -- -- -- --    05/12/25 0100 -- 84 11 176/85 120 100 % -- -- -- -- 14 --    05/12/25 0045 -- 95 14 153/77 110 100 % -- -- -- -- -- --    05/12/25 0044 -- -- -- -- -- 100 % -- -- Full face mask -- -- --    05/12/25 0031 98.4 °F (36.9 °C) 108 12 158/74 102 99 % -- -- -- -- -- --    05/12/25 0019 -- -- -- -- -- -- -- -- -- -- 15 7    05/11/25 2311 -- 99 14 144/79 -- 98 % -- None (Room air) -- -- 15 --    05/11/25 2209 -- 96 12 129/68 -- 99 % 2 L/min Nasal cannula -- Lying -- --    05/11/25 2129 -- 97 17 121/76 -- 98 % 2 L/min Nasal cannula -- Lying -- --    05/11/25 2104 -- -- -- -- -- -- -- -- -- --  14 --    05/11/25 2057 97.4 °F (36.3 °C) -- -- -- -- -- -- -- -- -- -- --    05/11/25 2054 -- -- -- -- -- -- -- Nasal cannula -- -- -- --    05/11/25 2051 -- 106 24 150/80 -- 100 % -- Non-rebreather mask -- Lying -- --    05/11/25 2050 -- -- -- -- -- -- -- -- -- -- 14 --          Weight (last 2 days) before discharge       Date/Time Weight    05/14/25 0613 84.8 (186.95)    05/13/25 0534 85.6 (188.71)            Pertinent Labs/Diagnostic Results:   Radiology:  XR chest 1 view portable   ED Interpretation by Javier Sutton MD (05/11 2217)   Mild perihilar fullness, mild blunting left costophrenic angle      Final Interpretation by Anton Damico MD (05/12 1435)      No acute cardiopulmonary disease.               Resident: Bobby Benites I, the attending radiologist, have reviewed the images and agree with the final report above.      Workstation performed: [id number]           Cardiology:  ECG 12 lead   Final Result by Carlos Hagen MD (05/14 0649)   Normal sinus rhythm   Normal ECG   When compared with ECG of 11-May-2025 21:23, (unconfirmed)   Nonspecific T wave abnormality no longer evident in Lateral leads   Confirmed by Carlos Hagen ([id number]) on 5/14/2025 6:49:16 AM      ECG 12 lead   Final Result by Carlos Hagen MD (05/14 0648)   Normal sinus rhythm   Minimal voltage criteria for LVH, may be normal variant   Nonspecific T wave abnormality   Abnormal ECG   When compared with ECG of 27-Dec-2023 19:18,   Minimal voltage criteria for LVH are now present   Confirmed by Carlos Hagen ([id number]) on 5/14/2025 6:48:48 AM        GI:  No orders to display           Results from last 7 days   Lab Units 05/13/25  0519 05/12/25  0449 05/11/25  2105   WBC Thousand/uL 7.79 9.00 10.07   HEMOGLOBIN g/dL 8.4* 8.1* 10.4*   HEMATOCRIT % 28.4* 28.3* 36.9   PLATELETS Thousands/uL 297 290 380   TOTAL NEUT ABS Thousands/µL 6.28 5.88 6.53         Results from last 7 days   Lab  Units 05/13/25  0519 05/12/25  0538 05/12/25  0449 05/12/25  0130 05/11/25  2105   SODIUM mmol/L 138 140 133* 140 140   POTASSIUM mmol/L 3.9 4.1 3.6 4.3 4.1   CHLORIDE mmol/L 109* 108 100 106 108   CO2 mmol/L 23 23 21 23 22   ANION GAP mmol/L 6 9 12 11 10   BUN mg/dL 11 11 10 12 13   CREATININE mg/dL 0.72 0.83 0.77 0.88 1.15   EGFR ml/min/1.73sq m 94 79 87 74 53   CALCIUM mg/dL 8.5 7.3* 6.4* 7.1* 8.1*   CALCIUM, IONIZED mmol/L  --   --  0.98*  --   --    MAGNESIUM mg/dL  --  1.8* 1.6*  --   --    PHOSPHORUS mg/dL  --   --  2.9  --   --      Results from last 7 days   Lab Units 05/13/25  0519 05/12/25  0449 05/12/25  0130 05/12/25  0043 05/11/25  2105   AST U/L 16 27 34 38 53*   ALT U/L 22 27 32 36 41   ALK PHOS U/L 81 66 79 85 116*   TOTAL PROTEIN g/dL 5.5* 4.8* 5.4* 5.6* 6.0*   ALBUMIN g/dL 3.5 2.8* 3.4* 3.5 3.9   TOTAL BILIRUBIN mg/dL 0.33 0.27 0.23 0.25 0.38   BILIRUBIN DIRECT mg/dL  --   --   --  0.00  --      Results from last 7 days   Lab Units 05/14/25  1103 05/14/25  0630 05/13/25  2355 05/13/25  1122 05/13/25  0522 05/12/25  2325 05/12/25  1724 05/12/25  1202 05/12/25  0553 05/12/25  0055 05/12/25  0011 05/11/25  2100   POC GLUCOSE mg/dl 90 97 107 179* 131 177* 186* 96 111 134 150* 288*     Results from last 7 days   Lab Units 05/13/25  0519 05/12/25  0538 05/12/25 0449 05/12/25  0130 05/11/25  2105   GLUCOSE RANDOM mg/dL 128 113 433* 202* 286*         Results from last 7 days   Lab Units 05/11/25  2348   HEMOGLOBIN A1C % 6.7*   EAG mg/dl 146           Results from last 7 days   Lab Units 05/12/25 0449 05/12/25  0130 05/11/25  2348   PH RUSS  7.273* 7.242* 7.106*   PCO2 RUSS mm Hg 40.0* 50.9* 55.7*   PO2 RUSS mm Hg 42.6 40.9 42.3   HCO3 RUSS mmol/L 18.1* 21.4* 17.1*   BASE EXC RUSS mmol/L -8.1 -5.9 -12.1   O2 CONTENT RUSS ml/dL 8.8 8.8 9.4   O2 HGB, VENOUS % 74.5 63.8 70.3         Results from last 7 days   Lab Units 05/11/25  2120   CK TOTAL U/L 50     Results from last 7 days   Lab Units 05/12/25 0449  05/12/25  0259 05/12/25  0043   HS TNI 0HR ng/L  --   --  16   HS TNI 2HR ng/L  --  22  --    HSTNI D2 ng/L  --  6  --    HS TNI 4HR ng/L 17  --   --    HSTNI D4 ng/L 1  --   --              Results from last 7 days   Lab Units 05/13/25  1949   TSH 3RD GENERATON uIU/mL 4.311     Results from last 7 days   Lab Units 05/13/25  0519 05/12/25  0130   PROCALCITONIN ng/ml 0.20 0.13     Results from last 7 days   Lab Units 05/12/25  0130 05/11/25  2305   LACTIC ACID mmol/L 0.9 <0.2*                 Results from last 7 days   Lab Units 05/11/25 2120   FERRITIN ng/mL 7*   IRON SATURATION % 5*   IRON ug/dL 22*   TIBC ug/dL 471.8*     Results from last 7 days   Lab Units 05/11/25 2120   TRANSFERRIN mg/dL 337           Results from last 7 days   Lab Units 05/11/25 2120   CLARITY UA  Turbid   COLOR UA  Yellow   SPEC GRAV UA  1.018   PH UA  5.5   GLUCOSE UA mg/dl >=1000 (1%)*   KETONES UA mg/dl Negative   BLOOD UA  Negative   PROTEIN UA mg/dl Trace*   NITRITE UA  Positive*   BILIRUBIN UA  Negative   UROBILINOGEN UA (BE) mg/dl <2.0   LEUKOCYTES UA  Elevated glucose may cause decreased leukocyte values. See urine microscopic for UWBC result*   WBC UA /hpf 4-10*   RBC UA /hpf None Seen   BACTERIA UA /hpf Moderate*   EPITHELIAL CELLS WET PREP /hpf None Seen     Results from last 7 days   Lab Units 05/12/25  0748   STREP PNEUMONIAE ANTIGEN, URINE  Negative   LEGIONELLA URINARY ANTIGEN  Negative         Results from last 7 days   Lab Units 05/11/25 2120   AMPH/METH  Negative   BARBITURATE UR  Negative   BENZODIAZEPINE UR  Negative   COCAINE UR  Negative   METHADONE URINE  Negative   OPIATE UR  Negative   PCP UR  Negative   THC UR  Negative     Results from last 7 days   Lab Units 05/12/25  0449 05/12/25  0043 05/11/25  2105   ETHANOL LVL mg/dL  --   --  <10   ACETAMINOPHEN LVL ug/mL 3* 7* 23*   SALICYLATE LVL mg/dL  --   --  <5*           Results from last 7 days   Lab Units 05/12/25  0259 05/12/25  0222   BLOOD CULTURE  No Growth  at 48 hrs. No Growth at 48 hrs.         Network Utilization Review Department  ATTENTION: Please call with any questions or concerns to [phone] and carefully listen to the prompts so that you are directed to the right person. All voicemails are confidential.   For Discharge needs, contact Care Management DC Support Team at [phone] opt. 2  Send all requests for admission clinical reviews, approved or denied determinations and any other requests to dedicated fax number below belonging to the campus where the patient is receiving treatment. List of dedicated fax numbers for the Facilities:  FACILITY NAME UR FAX NUMBER   ADMISSION DENIALS (Administrative/Medical Necessity) [phone]   DISCHARGE SUPPORT TEAM (NETWORK) [phone]   PARENT CHILD HEALTH (Maternity/NICU/Pediatrics) [phone]   [facility] [phone]   [facility] [phone]   [facility] [phone]   [facility] [phone]   [facility] [phone]   [facility] [phone]   [facility] [phone]   [facility] [phone]   [facility] [phone]   [facility] [phone]   [facility] [phone]   [facility] [phone]

## 2025-05-14 NOTE — NURSING NOTE
"Pt is a 302 from [facility] Med surg floor. Pt reports taking tylenol PM with scheduled medication d/t having a cold. Sent a jhonatan text to ex  who pt lives with. Pt denies taking medication was a suicidal attempt. Denies text being a \"goodbye text\". Did not want to come in-patient. Cooperative during admission. Reports one suicide attempt in 20 years by OD. Pt denies HI or hallucination. Denies drug, etoh, and tobacco. UDS(+) Hydrocodone. Pt has history of diabetes, HTN, gastric bypass. Pt reports having spinal cord stimular that \"has been dead the past week). Reports plate/screws in neck and knee replacement history. Pt uses cane at home d/t limited mobility.     Dr. Boyle made aware med req completed and scored Moderate Risk on lifetime.  "

## 2025-05-14 NOTE — UTILIZATION REVIEW
NOTIFICATION OF ADMISSION DISCHARGE   This is a Notification of Discharge from [facility]. Please be advised that this patient has been discharge from our facility. Below you will find the admission and discharge date and time including the patient’s disposition.   UTILIZATION REVIEW CONTACT:  Utilization Review Assistants  Network Utilization Review Department  Phone: [phone] x carefully listen to the prompts. All voicemails are confidential.  Email: [email or web address]     ADMISSION INFORMATION  PRESENTATION DATE: 5/11/2025  8:48 PM  OBERVATION ADMISSION DATE: N/A  INPATIENT ADMISSION DATE: 5/11/25 11:15 PM   DISCHARGE DATE: 5/14/2025  9:55 AM   DISPOSITION:Non [facility] Psych Hos/Distinct Psych    Network Utilization Review Department  ATTENTION: Please call with any questions or concerns to [phone] and carefully listen to the prompts so that you are directed to the right person. All voicemails are confidential.   For Discharge needs, contact Care Management DC Support Team at [phone] opt. 2  Send all requests for admission clinical reviews, approved or denied determinations and any other requests to dedicated fax number below belonging to the campus where the patient is receiving treatment. List of dedicated fax numbers for the Facilities:  FACILITY NAME UR FAX NUMBER   ADMISSION DENIALS (Administrative/Medical Necessity) [phone]   DISCHARGE SUPPORT TEAM ([facility]) [phone]   PARENT CHILD HEALTH (Maternity/NICU/Pediatrics) [phone]   [facility] [phone]   [facility] [phone]   Critical access hospital [phone]   [facility] [phone]   [facility] [phone]   [facility] [phone]   [facility] [phone]   [facility]  [phone]   [facility] [phone]   [facility] [phone]   [facility] [phone]   [facility] [phone]

## 2025-05-15 PROBLEM — G43.711 INTRACTABLE CHRONIC MIGRAINE WITHOUT AURA AND WITH STATUS MIGRAINOSUS: Status: ACTIVE | Noted: 2025-05-15

## 2025-05-15 PROBLEM — D50.8 IRON DEFICIENCY ANEMIA SECONDARY TO INADEQUATE DIETARY IRON INTAKE: Status: ACTIVE | Noted: 2025-05-15

## 2025-05-15 PROBLEM — F33.1 MODERATE EPISODE OF RECURRENT MAJOR DEPRESSIVE DISORDER (HCC): Status: ACTIVE | Noted: 2025-05-15

## 2025-05-15 PROBLEM — Z00.8 MEDICAL CLEARANCE FOR PSYCHIATRIC ADMISSION: Status: ACTIVE | Noted: 2025-05-15

## 2025-05-15 PROBLEM — F33.1 MODERATE EPISODE OF RECURRENT MAJOR DEPRESSIVE DISORDER (HCC): Chronic | Status: ACTIVE | Noted: 2025-05-15

## 2025-05-15 LAB
25(OH)D3 SERPL-MCNC: 27 NG/ML (ref 30–100)
ALBUMIN SERPL BCG-MCNC: 3.5 G/DL (ref 3.5–5)
ALP SERPL-CCNC: 79 U/L (ref 34–104)
ALT SERPL W P-5'-P-CCNC: 18 U/L (ref 7–52)
ANION GAP SERPL CALCULATED.3IONS-SCNC: 7 MMOL/L (ref 4–13)
AST SERPL W P-5'-P-CCNC: 15 U/L (ref 13–39)
BACTERIA UR QL AUTO: ABNORMAL /HPF
BASOPHILS # BLD AUTO: 0.05 THOUSANDS/ÂΜL (ref 0–0.1)
BASOPHILS NFR BLD AUTO: 1 % (ref 0–1)
BILIRUB SERPL-MCNC: 0.54 MG/DL (ref 0.2–1)
BILIRUB UR QL STRIP: NEGATIVE
BUN SERPL-MCNC: 8 MG/DL (ref 5–25)
CALCIUM SERPL-MCNC: 8.9 MG/DL (ref 8.4–10.2)
CHLORIDE SERPL-SCNC: 107 MMOL/L (ref 96–108)
CHOLEST SERPL-MCNC: 138 MG/DL (ref ?–200)
CLARITY UR: CLEAR
CO2 SERPL-SCNC: 29 MMOL/L (ref 21–32)
COLOR UR: YELLOW
CREAT SERPL-MCNC: 0.82 MG/DL (ref 0.6–1.3)
EOSINOPHIL # BLD AUTO: 0.22 THOUSAND/ÂΜL (ref 0–0.61)
EOSINOPHIL NFR BLD AUTO: 3 % (ref 0–6)
ERYTHROCYTE [DISTWIDTH] IN BLOOD BY AUTOMATED COUNT: 18.4 % (ref 11.6–15.1)
FOLATE SERPL-MCNC: 7.4 NG/ML
GFR SERPL CREATININE-BSD FRML MDRD: 80 ML/MIN/1.73SQ M
GLUCOSE P FAST SERPL-MCNC: 95 MG/DL (ref 65–99)
GLUCOSE SERPL-MCNC: 102 MG/DL (ref 65–140)
GLUCOSE SERPL-MCNC: 107 MG/DL (ref 65–140)
GLUCOSE SERPL-MCNC: 76 MG/DL (ref 65–140)
GLUCOSE SERPL-MCNC: 95 MG/DL (ref 65–140)
GLUCOSE SERPL-MCNC: 98 MG/DL (ref 65–140)
GLUCOSE UR STRIP-MCNC: NEGATIVE MG/DL
HCG SERPL QL: NEGATIVE
HCT VFR BLD AUTO: 32.4 % (ref 34.8–46.1)
HDLC SERPL-MCNC: 65 MG/DL
HGB BLD-MCNC: 9.2 G/DL (ref 11.5–15.4)
HGB UR QL STRIP.AUTO: NEGATIVE
IMM GRANULOCYTES # BLD AUTO: 0.03 THOUSAND/UL (ref 0–0.2)
IMM GRANULOCYTES NFR BLD AUTO: 1 % (ref 0–2)
KETONES UR STRIP-MCNC: NEGATIVE MG/DL
LDLC SERPL CALC-MCNC: 58 MG/DL (ref 0–100)
LEUKOCYTE ESTERASE UR QL STRIP: ABNORMAL
LYMPHOCYTES # BLD AUTO: 2.5 THOUSANDS/ÂΜL (ref 0.6–4.47)
LYMPHOCYTES NFR BLD AUTO: 39 % (ref 14–44)
MCH RBC QN AUTO: 22.6 PG (ref 26.8–34.3)
MCHC RBC AUTO-ENTMCNC: 28.4 G/DL (ref 31.4–37.4)
MCV RBC AUTO: 80 FL (ref 82–98)
MONOCYTES # BLD AUTO: 0.48 THOUSAND/ÂΜL (ref 0.17–1.22)
MONOCYTES NFR BLD AUTO: 8 % (ref 4–12)
NEUTROPHILS # BLD AUTO: 3.16 THOUSANDS/ÂΜL (ref 1.85–7.62)
NEUTS SEG NFR BLD AUTO: 48 % (ref 43–75)
NITRITE UR QL STRIP: NEGATIVE
NON-SQ EPI CELLS URNS QL MICRO: ABNORMAL /HPF
NONHDLC SERPL-MCNC: 73 MG/DL
NRBC BLD AUTO-RTO: 0 /100 WBCS
PH UR STRIP.AUTO: 6.5 [PH]
PLATELET # BLD AUTO: 329 THOUSANDS/UL (ref 149–390)
PMV BLD AUTO: 9.4 FL (ref 8.9–12.7)
POTASSIUM SERPL-SCNC: 4 MMOL/L (ref 3.5–5.3)
PROT SERPL-MCNC: 5.6 G/DL (ref 6.4–8.4)
PROT UR STRIP-MCNC: NEGATIVE MG/DL
RBC # BLD AUTO: 4.07 MILLION/UL (ref 3.81–5.12)
RBC #/AREA URNS AUTO: ABNORMAL /HPF
SODIUM SERPL-SCNC: 143 MMOL/L (ref 135–147)
SP GR UR STRIP.AUTO: 1.02 (ref 1–1.03)
TREPONEMA PALLIDUM IGG+IGM AB [PRESENCE] IN SERUM OR PLASMA BY IMMUNOASSAY: NORMAL
TRIGL SERPL-MCNC: 74 MG/DL (ref ?–150)
UROBILINOGEN UR STRIP-ACNC: <2 MG/DL
VIT B12 SERPL-MCNC: 351 PG/ML (ref 180–914)
WBC # BLD AUTO: 6.44 THOUSAND/UL (ref 4.31–10.16)
WBC #/AREA URNS AUTO: ABNORMAL /HPF

## 2025-05-15 PROCEDURE — 81001 URINALYSIS AUTO W/SCOPE: CPT | Performed by: PSYCHIATRY & NEUROLOGY

## 2025-05-15 PROCEDURE — 84703 CHORIONIC GONADOTROPIN ASSAY: CPT | Performed by: PHYSICIAN ASSISTANT

## 2025-05-15 PROCEDURE — 80053 COMPREHEN METABOLIC PANEL: CPT | Performed by: PHYSICIAN ASSISTANT

## 2025-05-15 PROCEDURE — 82306 VITAMIN D 25 HYDROXY: CPT | Performed by: PHYSICIAN ASSISTANT

## 2025-05-15 PROCEDURE — 82607 VITAMIN B-12: CPT | Performed by: PHYSICIAN ASSISTANT

## 2025-05-15 PROCEDURE — 82948 REAGENT STRIP/BLOOD GLUCOSE: CPT

## 2025-05-15 PROCEDURE — 86780 TREPONEMA PALLIDUM: CPT | Performed by: PHYSICIAN ASSISTANT

## 2025-05-15 PROCEDURE — 82746 ASSAY OF FOLIC ACID SERUM: CPT | Performed by: PHYSICIAN ASSISTANT

## 2025-05-15 PROCEDURE — 80061 LIPID PANEL: CPT | Performed by: PHYSICIAN ASSISTANT

## 2025-05-15 PROCEDURE — 85025 COMPLETE CBC W/AUTO DIFF WBC: CPT | Performed by: PHYSICIAN ASSISTANT

## 2025-05-15 PROCEDURE — 99254 IP/OBS CNSLTJ NEW/EST MOD 60: CPT | Performed by: PHYSICIAN ASSISTANT

## 2025-05-15 RX ORDER — ASCORBIC ACID 500 MG
500 TABLET ORAL
Status: DISCONTINUED | OUTPATIENT
Start: 2025-05-15 | End: 2025-05-19 | Stop reason: HOSPADM

## 2025-05-15 RX ORDER — FERROUS GLUCONATE 324(38)MG
324 TABLET ORAL
Status: DISCONTINUED | OUTPATIENT
Start: 2025-05-15 | End: 2025-05-19 | Stop reason: HOSPADM

## 2025-05-15 RX ORDER — METHOCARBAMOL 500 MG/1
750 TABLET, FILM COATED ORAL EVERY 6 HOURS PRN
Status: DISCONTINUED | OUTPATIENT
Start: 2025-05-15 | End: 2025-05-17

## 2025-05-15 RX ORDER — KETOROLAC TROMETHAMINE 10 MG/1
10 TABLET, FILM COATED ORAL EVERY 6 HOURS PRN
Status: DISCONTINUED | OUTPATIENT
Start: 2025-05-15 | End: 2025-05-15

## 2025-05-15 RX ORDER — KETOROLAC TROMETHAMINE 10 MG/1
10 TABLET, FILM COATED ORAL EVERY 6 HOURS PRN
Status: DISPENSED | OUTPATIENT
Start: 2025-05-15 | End: 2025-05-17

## 2025-05-15 RX ORDER — SUMATRIPTAN 50 MG/1
50 TABLET, FILM COATED ORAL ONCE AS NEEDED
Status: COMPLETED | OUTPATIENT
Start: 2025-05-15 | End: 2025-05-15

## 2025-05-15 RX ORDER — LACTULOSE 10 G/15ML
20 SOLUTION ORAL DAILY PRN
Status: DISCONTINUED | OUTPATIENT
Start: 2025-05-15 | End: 2025-05-19 | Stop reason: HOSPADM

## 2025-05-15 RX ADMIN — Medication 3 MG: at 22:27

## 2025-05-15 RX ADMIN — SUMATRIPTAN SUCCINATE 50 MG: 50 TABLET ORAL at 14:30

## 2025-05-15 RX ADMIN — FERROUS GLUCONATE 324 MG: 324 TABLET ORAL at 08:21

## 2025-05-15 RX ADMIN — DULOXETINE HYDROCHLORIDE 90 MG: 30 CAPSULE, DELAYED RELEASE ORAL at 17:45

## 2025-05-15 RX ADMIN — GABAPENTIN 300 MG: 300 CAPSULE ORAL at 16:35

## 2025-05-15 RX ADMIN — PANTOPRAZOLE SODIUM 20 MG: 20 TABLET, DELAYED RELEASE ORAL at 06:39

## 2025-05-15 RX ADMIN — OXYCODONE HYDROCHLORIDE AND ACETAMINOPHEN 500 MG: 500 TABLET ORAL at 22:27

## 2025-05-15 RX ADMIN — FERROUS GLUCONATE 324 MG: 324 TABLET ORAL at 22:26

## 2025-05-15 RX ADMIN — METHOCARBAMOL 750 MG: 500 TABLET ORAL at 22:29

## 2025-05-15 RX ADMIN — ATORVASTATIN CALCIUM 20 MG: 20 TABLET, FILM COATED ORAL at 16:35

## 2025-05-15 RX ADMIN — GABAPENTIN 1200 MG: 400 CAPSULE ORAL at 22:26

## 2025-05-15 RX ADMIN — GABAPENTIN 300 MG: 300 CAPSULE ORAL at 09:40

## 2025-05-15 RX ADMIN — PANTOPRAZOLE SODIUM 20 MG: 20 TABLET, DELAYED RELEASE ORAL at 16:35

## 2025-05-15 RX ADMIN — RIMEGEPANT SULFATE 75 MG: 75 TABLET, ORALLY DISINTEGRATING ORAL at 09:40

## 2025-05-15 NOTE — ASSESSMENT & PLAN NOTE
Patient reports status migrainosus with current headache lasting 30 days.  She has been receiving Botox as an outpatient for headache control and has failed a multitude of medications.  She was given both Ibuprofen and Fioricet without relief.  Will trial her on Toradol 10 mg, Imitrex 50 mg and Robaxin 750 mg.

## 2025-05-15 NOTE — PLAN OF CARE
Problem: PAIN - ADULT  Goal: Verbalizes/displays adequate comfort level or baseline comfort level  Description: Interventions:  - Encourage patient to monitor pain and request assistance  - Assess pain using appropriate pain scale  - Administer analgesics as ordered based on type and severity of pain and evaluate response  - Implement non-pharmacological measures as appropriate and evaluate response  - Consider cultural and social influences on pain and pain management  - Notify physician/advanced practitioner if interventions unsuccessful or patient reports new pain  - Educate patient/family on pain management process including their role and importance of  reporting pain   - Provide non-pharmacologic/complimentary pain relief interventions  Outcome: Not Progressing     Problem: INFECTION - ADULT  Goal: Absence or prevention of progression during hospitalization  Description: INTERVENTIONS:  - Assess and monitor for signs and symptoms of infection  - Monitor lab/diagnostic results  - Monitor all insertion sites, i.e. indwelling lines, tubes, and drains  - Monitor endotracheal if appropriate and nasal secretions for changes in amount and color  - [place] appropriate cooling/warming therapies per order  - Administer medications as ordered  - Instruct and encourage patient and family to use good hand hygiene technique  - Identify and instruct in appropriate isolation precautions for identified infection/condition  Outcome: Progressing  Goal: Absence of fever/infection during neutropenic period  Description: INTERVENTIONS:  - Monitor WBC  - Perform strict hand hygiene  - Limit to healthy visitors only  - No plants, dried, fresh or silk flowers with tubbs in patient room  Outcome: Progressing     Problem: SAFETY ADULT  Goal: Maintain or return to baseline ADL function  Description: INTERVENTIONS:  -  Assess patient's ability to carry out ADLs; assess patient's baseline for ADL function and identify physical deficits  which impact ability to perform ADLs (bathing, care of mouth/teeth, toileting, grooming, dressing, etc.)  - Assess/evaluate cause of self-care deficits   - Assess range of motion  - Assess patient's mobility; develop plan if impaired  - Assess patient's need for assistive devices and provide as appropriate  - Encourage maximum independence but intervene and supervise when necessary  - Involve family in performance of ADLs  - Assess for home care needs following discharge   - Consider OT consult to assist with ADL evaluation and planning for discharge  - Provide patient education as appropriate  - Monitor functional capacity and physical performance, use of AM PAC & JH-HLM   - Monitor gait, balance and fatigue with ambulation    Outcome: Progressing  Goal: Maintains/Returns to pre admission functional level  Description: INTERVENTIONS:  - Perform AM-PAC 6 Click Basic Mobility/ Daily Activity assessment daily.  - Set and communicate daily mobility goal to care team and patient/family/caregiver.   - Collaborate with rehabilitation services on mobility goals if consulted  - Perform Range of Motion  times a day.  - Reposition patient every  hours.  - Dangle patient  times a day  - Stand patient  times a day  - Ambulate patient  times a day  - Out of bed to chair  times a day   - Out of bed for meals   Problem: DISCHARGE PLANNING  Goal: Discharge to home or other facility with appropriate resources  Description: INTERVENTIONS:  - Identify barriers to discharge w/patient and caregiver  - Arrange for needed discharge resources and transportation as appropriate  - Identify discharge learning needs (meds, wound care, etc.)  - Arrange for interpretive services to assist at discharge as needed  - Refer to Case Management Department for coordinating discharge planning if the patient needs post-hospital services based on physician/advanced practitioner order or complex needs related to functional status, cognitive ability, or  social support system  Outcome: Progressing     Problem: Knowledge Deficit  Goal: Patient/family/caregiver demonstrates understanding of disease process, treatment plan, medications, and discharge instructions  Description: Complete learning assessment and assess knowledge base.  Interventions:  - Provide teaching at level of understanding  - Provide teaching via preferred learning methods  Outcome: Progressing     Problem: DEPRESSION  Goal: Will be euthymic at discharge  Description: INTERVENTIONS:  - Administer medication as ordered  - Provide emotional support via 1:1 interaction with staff  - Encourage involvement in milieu/groups/activities  - Monitor for social isolation  Outcome: Progressing     Problem: ANXIETY  Goal: Will report anxiety at manageable levels  Description: INTERVENTIONS:  - Administer medication as ordered  - Teach and encourage coping skills  - Provide emotional support  - Assess patient/family for anxiety and ability to cope  Outcome: Progressing  Goal: By discharge: Patient will verbalize 2 strategies to deal with anxiety  Description: Interventions:  - Identify any obvious source/trigger to anxiety  - Staff will assist patient in applying identified coping technique/skills  - Encourage attendance of scheduled groups and activities  Outcome: Progressing     Problem: INVOLUNTARY ADMIT  Goal: Will cooperate with staff recommendations and doctor's orders and will demonstrate appropriate behavior  Description: INTERVENTIONS:  - Treat underlying conditions and offer medication as ordered  - Educate regarding involuntary admission procedures and rules  - Utilize positive consistent limit setting strategies to support patient and staff safety  Outcome: Progressing    times a day  - Out of bed for toileting  - Record patient progress and toleration of activity level   Outcome: Progressing

## 2025-05-15 NOTE — CONSULTS
Consultation - Hospitalist   Name: Marva Salgado 54 y.o. female I MRN: [id number]  Unit/Bed#: -02 I Date of Admission: 5/14/2025   Date of Service: 5/15/2025 I Hospital Day: 1   Inpatient consult for Medical Clearance for  patient  Consult performed by: Yamileth You PA-C  Consult ordered by: Ngoc Maravilla MD        Physician Requesting Evaluation: Lisa Sprague*   Reason for Evaluation / Principal Problem: Medical clearance for psychiatric treatment    Assessment & Plan  Medical clearance for psychiatric admission  Patient is medically cleared to proceed with psychiatric treatment.  Intentional overdose (HCC)  Patient originally hospitalized at [place] in the ICU for intentional overdose including gabapentin, Dilaudid, codeine, Tylenol, Robaxin, Klonopin and Cymbalta.  Depression treatment per psychiatry.  Type 2 diabetes mellitus without complication, without long-term current use of insulin (Formerly [facility])  Lab Results   Component Value Date    HGBA1C 6.7 (H) 05/11/2025     Recent Labs     05/14/25  0630 05/14/25  1103 05/14/25  2142 05/15/25  0806   POCGLU 97 90 98 107     Blood Sugar Average: Last 72 hrs:  (P) 98.7394339143907669  Blood sugars are well controlled.  Continue accu-checks and SSI  H/O gastric bypass  Likely the cause of her iron deficiency and Vitamin B12 deficiency.  Iron deficiency anemia secondary to inadequate dietary iron intake  Severe iron deficiency as evidenced by ferritin level of 7, total iron of 22 and sat of 5% (5/11/25).  Patient would benefit from iron infusion as an out-patient.  In the meantime, recommend ferrous sulfate 325 mg at bedtime in addition to Vitamin C 500 mg at bedtime to aid in absorption.  Intractable chronic migraine without aura and with status migrainosus  Patient reports status migrainosus with current headache lasting 30 days.  She has been receiving Botox as an outpatient for headache control and has failed a multitude of medications.  She was  given both Ibuprofen and Fioricet without relief.  Will trial her on Toradol 10 mg, Imitrex 50 mg and Robaxin 750 mg.  Please contact the SecureChat role for the Hospitalist service with any questions/concerns.      VTE Pharmacologic Prophylaxis:   Low Risk (Score 0-2) - Encourage Ambulation.    History of Present Illness   Chief Complaint: Headache, nauseous    Marva Salgado is a 54 y.o. female with a PMH of diabetes (diet controlled), iron deficiency anemia, migraine headaches, sleep apnea and depression who presents with suicide attempt with drug overdose. Currently, the patient is complaining of a persistent headache that has been present now for 30 days.  She is also complaining of constipation, stating her last BM was 6 days ago.  She is nauseous, which is related to her headache.      Review of Systems   Constitutional:  Negative for fatigue.   Eyes:  Negative for visual disturbance.   Respiratory:  Negative for chest tightness and shortness of breath.    Cardiovascular:  Negative for chest pain.   Gastrointestinal:  Negative for abdominal pain, constipation, diarrhea, nausea and vomiting.   Genitourinary:  Negative for frequency, hematuria, pelvic pain and urgency.   Musculoskeletal:  Negative for back pain and neck pain.   Neurological:  Negative for dizziness, tremors, seizures, light-headedness and headaches.   Psychiatric/Behavioral:  Negative for confusion, dysphoric mood and hallucinations. The patient is not nervous/anxious.        Historical Information   Past Medical History:   Diagnosis Date    Arthritis     Asthma     CPAP (continuous positive airway pressure) dependence     Depression     Diabetes mellitus (HCC)     Gait disturbance     uses w/c for long distance    Gastritis     Hiatal hernia     Hx of fusion of cervical spine     Hypertension     Migraines     Morbid obesity (HCC)     PONV (postoperative nausea and vomiting)     difficulty awakening- too much CO2    Pulmonary emboli (HCC)      hx blood clot also in neck    S/P insertion of spinal cord stimulator     Sleep apnea     Tachycardia     Wears contact lenses      Past Surgical History:   Procedure Laterality Date    COLONOSCOPY      DISCECTOMY SPINE CERVICAL POSTERIOR      HYSTERECTOMY      and bladder sling    JOINT REPLACEMENT      left TKR and partial right    NECK SURGERY      cervical fusion    AK LAPS GSTR RSTCV PX W/BYP GWYN-EN-Y LIMB <150 CM N/A 11/30/2021    Procedure: ROBOTIC BYPASS GASTRIC GWYN-EN-Y,INTRAOP EGD, PARAESOPHAGEAL HERNIA REPAIR W/ MESH;  Surgeon: Gabriel Gutierrez MD;  Location: [facility];  Service: Bariatrics    SHOULDER OPEN ROTATOR CUFF REPAIR      SPINAL CORD STIMULATOR IMPLANT       Social History     Tobacco Use    Smoking status: Never    Smokeless tobacco: Never   Vaping Use    Vaping status: Never Used   Substance and Sexual Activity    Alcohol use: Not Currently     Comment: few x year    Drug use: No    Sexual activity: Not on file     E-Cigarette/Vaping    E-Cigarette Use Never User      E-Cigarette/Vaping Substances    Nicotine No     THC No     CBD No     Flavoring No     Other No     Unknown No      Family History   Problem Relation Age of Onset    Arthritis Mother     Asthma Mother     Depression Mother     Heart disease Father     Diabetes Father     Diabetes Sister     Heart disease Paternal Uncle     Heart disease Paternal Grandfather      Social History:  Marital Status: /Civil Union   Occupation: Dietician  Patient Pre-hospital Living Situation: Home  Patient Pre-hospital Level of Mobility: walks  Patient Pre-hospital Diet Restrictions: lactose intolerance    Meds/Allergies   I have reviewed home medications using recent Epic encounter.  Prior to Admission medications    Medication Sig Start Date End Date Taking? Authorizing Provider   atorvastatin (LIPITOR) 20 mg tablet Take 20 mg by mouth in the morning. 1/10/25  Yes Historical Provider, MD   cyanocobalamin 1,000 mcg/mL Inject 1,000 mcg under  the skin every 30 (thirty) days 4/27/25  Yes Historical Provider, MD   DULoxetine (CYMBALTA) 60 mg delayed release capsule Take 90 mg by mouth every evening 7/8/20  Yes Historical Provider, MD   eletriptan (RELPAX) 40 MG tablet Take 40 mg by mouth once as needed for migraine 3/19/25  Yes Historical Provider, MD   ferrous gluconate (FERGON) 324 mg tablet Take 324 mg by mouth daily with breakfast 4/25/25  Yes Historical Provider, MD   methocarbamol (ROBAXIN) 500 mg tablet Take 500 mg by mouth in the morning and 500 mg in the evening and 500 mg before bedtime. 5/3/25  Yes Historical Provider, MD   pantoprazole (PROTONIX) 20 mg tablet Take 1 tablet (20 mg total) by mouth 2 (two) times a day before meals 5/13/25  Yes Gorge Coe MD   polyethylene glycol (MIRALAX) 17 g packet Take 17 g by mouth daily as needed (constipation) 5/13/25  Yes Gorge Coe MD   rimegepant sulfate (NURTEC) 75 mg TBDP Take 1 tablet (75 mg total) by mouth in the morning 5/14/25  Yes Gorge Coe MD   albuterol (PROVENTIL HFA,VENTOLIN HFA) 90 mcg/act inhaler Inhale 2 puffs every 4 (four) hours as needed 7/8/20   Historical Provider, MD   diphenhydrAMINE (BENADRYL) 25 mg tablet Take 1 tablet (25 mg total) by mouth every 6 (six) hours as needed for itching (headaches) 5/13/25   Gorge Coe MD   gabapentin (NEURONTIN) 300 mg capsule Take 1 capsule (300 mg total) by mouth 3 (three) times a day  Patient taking differently: Take 300 mg by mouth in the morning and 300 mg in the evening. 5/13/25   Gorge Coe MD   metoclopramide (REGLAN) 10 mg tablet Take 1 tablet (10 mg total) by mouth 3 (three) times a day as needed (headaches) 5/13/25   Gorge Coe MD     Allergies   Allergen Reactions    Iodine - Food Allergy Anaphylaxis           Lyrica [Pregabalin] Other (See Comments)     Suicidal ideations    Shellfish Allergy - Food Allergy Anaphylaxis    Wellbutrin [Bupropion] Hives     Shaky and dizzy    Wound  Dressing Adhesive Hives     Including tegaderm dressing, paper tape ok but can react to it     Latex Hives    Percocet [Oxycodone-Acetaminophen] Itching       Objective :  Temp:  [94.8 °F (34.9 °C)-98.2 °F (36.8 °C)] 97.7 °F (36.5 °C)  HR:  [72-91] 72  BP: (119-130)/(79-80) 126/80  Resp:  [16-19] 16  SpO2:  [93 %-100 %] 93 %  O2 Device: None (Room air)    Physical Exam  Vitals and nursing note reviewed.   Constitutional:       General: She is not in acute distress.     Appearance: She is well-developed.   HENT:      Head: Normocephalic and atraumatic.     Eyes:      Conjunctiva/sclera: Conjunctivae normal.       Cardiovascular:      Rate and Rhythm: Normal rate and regular rhythm.      Heart sounds: No murmur heard.  Pulmonary:      Effort: Pulmonary effort is normal. No respiratory distress.      Breath sounds: Normal breath sounds. No wheezing, rhonchi or rales.   Abdominal:      General: Bowel sounds are normal. There is no distension.      Palpations: Abdomen is soft.      Tenderness: There is no abdominal tenderness.     Skin:     General: Skin is warm and dry.     Neurological:      Mental Status: She is alert.     Psychiatric:         Mood and Affect: Mood normal.          Lab Results: I have reviewed the following results:  Results from last 7 days   Lab Units 05/15/25  0555   WBC Thousand/uL 6.44   HEMOGLOBIN g/dL 9.2*   HEMATOCRIT % 32.4*   PLATELETS Thousands/uL 329   SEGS PCT % 48   LYMPHO PCT % 39   MONO PCT % 8   EOS PCT % 3     Results from last 7 days   Lab Units 05/13/25  0519   SODIUM mmol/L 138   POTASSIUM mmol/L 3.9   CHLORIDE mmol/L 109*   CO2 mmol/L 23   BUN mg/dL 11   CREATININE mg/dL 0.72   ANION GAP mmol/L 6   CALCIUM mg/dL 8.5   ALBUMIN g/dL 3.5   TOTAL BILIRUBIN mg/dL 0.33   ALK PHOS U/L 81   ALT U/L 22   AST U/L 16   GLUCOSE RANDOM mg/dL 128         Results from last 7 days   Lab Units 05/15/25  0806 05/14/25  2142 05/14/25  1103 05/14/25  0630 05/13/25  2355 05/13/25  1122 05/13/25  0522  05/12/25  2325 05/12/25  1724 05/12/25  1202 05/12/25  0553 05/12/25  0055   POC GLUCOSE mg/dl 107 98 90 97 107 179* 131 177* 186* 96 111 134     Lab Results   Component Value Date    HGBA1C 6.7 (H) 05/11/2025    HGBA1C 6.1 (H) 02/03/2025    HGBA1C 5.9 (H) 11/03/2022     Results from last 7 days   Lab Units 05/13/25  0519 05/12/25  0130 05/11/25  2305   LACTIC ACID mmol/L  --  0.9 <0.2*   PROCALCITONIN ng/ml 0.20 0.13  --        Imaging Results Review: No pertinent imaging studies reviewed.  Other Study Results Review: No additional pertinent studies reviewed.    Administrative Statements   I have spent a total time of 30 minutes in caring for this patient on the day of the visit/encounter including Instructions for management, Patient and family education, Importance of tx compliance, Documenting in the medical record, Reviewing/placing orders in the medical record (including tests, medications, and/or procedures), Obtaining or reviewing history  , and Communicating with other healthcare professionals .    ** Please Note: This note has been constructed using a voice recognition system. **

## 2025-05-15 NOTE — DISCHARGE INSTR - OTHER ORDERS
[facility] CRISIS INFORMATION   The PEER LINE is a toll-free telephone number for people in [facility] who are seeking a listening ear for additional support in their recovery from mental illness.  The PEER LINE is peer-run and peer-friendly.   You can call the Peer Line 24 hours a day. Phone: 7-932-HZ-PEERS /(1-589.923.8051)   Emergency Services  [facility] Emergency Services: [phone]  [address] [place] [address]     Text CONNECT to 584691 from anywhere in the [place], anytime, about any type of crisis.  A live, trained Crisis Counselor receives the text and lets you know that they are here to listen.  The volunteer Crisis Counselor will help you move from a hot moment to a cool moment.    Warm Line: ([phone]) 678-7827, ([phone], ([phone]  If it is not quite a crisis, but you want to talk to someone, 24 hours/day, 7 days/week:  Someone to listen; someone who cares.    The National [place] [employer] Mental Illness (CHAS) offers various education & support groups for you & your family.  For more information visit their website at   http://www.chas-lv.org/.    Dial 2-1-1 to get connected/get help.  Free, confidential information & referral available 24/7: Aging Services, Child & Youth Services, Counseling, Education/Training, Food/Shelter/Clothing, Health Services, Parenting, Substance Abuse, Support Groups, Volunteer Opportunities, & much more.  Phone: 2-1-1 or 707-299-4198, Web: www.Avancert.Optifreeze, Email: 211@[email or web address] CRISIS INFORMATION   Crisis Intervention Hotline  [facility] Crisis Intervention: 874.311.3034 [phone]    Our approach to behavioral and mental health care  We know that mental health care is critical to helping people navigate all kinds of relationships -- in our families and with loved ones, among co-workers and with classmates.    That's why we take a highly personalized approach to care, focused on you as a whole person, fully  considering your physical, emotional, social and spiritual well-being. We offer services in person and virtually, as well as in home, school and community settings.    At the core of our approach are:    personalization: We offer a wide range of services, from therapy and counseling to medication management and support groups. We'll work together to find the right combination for you.  team focus: Our team of experienced professionals includes psychiatrists, psychologists and master’s level counselors. We stay in sync with one another to provide you with the best possible care.  human-guided technology: We view technology as a useful tool in the hands of our empathetic experts. Here, tech doesn't lead -- it powerfully supports.  safety: Your safety and support are always our top priorities. We have a zero-tolerance policy for abuse or neglect in any form.  Proudly integrating with primary care and specialty care  Our behavioral health practitioners are embedded with many of [facility] primary care offices as well as certain specialty care practices:    Maternity care: Helping moms struggling with postpartum depression  Heart and vascular: Improving recovery for surgical patients  Pain management: Complementing medical interventions  How we can help  Access to care  To access care, call us at [phone] so we can connect you with the care you need (available 24/7).    Outpatient clinics  Once you are an established patient, you can call our outpatient clinics directly to schedule or change appointments:    [facility]: [phone]  [facility]: [phone]  [facility]: [phone]  Crisis numbers  For emergency mental health care needs, referrals and other resources, call the Crisis Intervention line for your county:    [facility]: [phone]                     [facility]: [phone]    [facility]: [phone]              [facility]:  [phone]    Mobile Crisis Intervention Services are available in [facility] and [facility].    Call or text 938 for the Suicide and Crisis Lifeline.

## 2025-05-15 NOTE — TREATMENT PLAN
TREATMENT PLAN REVIEW - Behavioral Health Marva Salgado 54 y.o. 1970 female MRN: [id number]    [facility] QU IP BEHAVIORAL HLTH Room / Bed: [facility] 204/[facility] 204-02 Encounter: [id number]          Admit Date/Time:  5/14/2025 10:33 AM    Treatment Team:   MD Olivier Frank, EDWIN Villalba, EDWIN Christina, CHAUNCEY Simons  WILLARD Estrella    Diagnosis: Principal Problem:    Moderate episode of recurrent major depressive disorder (HCC)  Active Problems:    Essential hypertension    Type 2 diabetes mellitus without complication, without long-term current use of insulin (HCC)    Intentional overdose (HCC)    H/O gastric bypass    Iron deficiency anemia secondary to inadequate dietary iron intake    Intractable chronic migraine without aura and with status migrainosus    Medical clearance for psychiatric admission      Patient Strengths/Assets: ability for insight, cooperative, motivation for treatment/growth    Patient Barriers/Limitations: chronic pain, chronic mental illness, family conflict    Short Term Goals: decrease in depressive symptoms, decrease in anxiety symptoms, decrease in suicidal thoughts, improvement in self care, sleep improvement    Long Term Goals: improvement in depression, improvement in anxiety, resolution of depressive symptoms, stabilization of mood, free of suicidal thoughts, improved insight, acceptance of need for psychiatric medications, acceptance of need for psychiatric treatment    Progress Towards Goals: starting psychiatric medications as prescribed, improving, attends groups, mood is stabilizing, less anxious, less depressed, less labile    Recommended Treatment: medication management, patient medication education, group therapy, milieu therapy, continued Behavioral Health psychiatric  evaluation/assessment process    Treatment Frequency: daily medication monitoring, group and milieu therapy daily, monitoring through interdisciplinary rounds, monitoring through weekly patient care conferences    Expected Discharge Date:  5-7 days    Discharge Plan: referral for outpatient medication management with a psychiatrist, referral for outpatient psychotherapy    Treatment Plan Created/Updated By: Lisa Boyle MD

## 2025-05-15 NOTE — CASE MANAGEMENT
"INTAKE CM attempted to meet with pt to complete intake, 5/16/25 @ 2:30pm, pt in bed with head covered by blankets and did not wake to CM calling her name. Pt's roommate reported she has a migraine. CM will try again at a later time. Intake obtained from chart review.      Readmit score:  14 Green   Confirmed Address   [address] [place] [address]        County: [place]     Resides in the home with/can return?:    Pt reported she stays at above address a few times a month to be with her youngest son.     Pt reported she also stays at [address] [place] [id number] and plans to seek her MHOP services there as most of her other doctors are there.      Confirmed Phone Number: [phone]    Commitment Status/Admitted from: 2 Doctor 302 from [facility] 5/11/25  Pt signed 201 on Friday 5/16/25      Presenting C/O:             ED Note   \"54-year-old female with past medical history of depression, anxiety, type 2 diabetes, CHIDI, adrenal insufficiency, asthma, history of chronic migraine and chronic pain syndrome had intentional drug overdose with multiple medications including gabapentin, Dilaudid, codeine, Tylenol, Robaxin, Klonopin and Cymbalta and admitted for intentional drug overdose and altered mental status.  Patient also commented and texted her friends and family goodbye prior to taking intentional drug overdose.  Patient was monitored in the ICU, and treated with IV fluids.  Toxicology was consulted and initially patient was started NAC but then was discontinued.  Patient was stable and moved out of the ICU.  Currently on the regular floors.  Doing well.  Patient currently is medically stable to be discharged to inpatient psych facility when bed available.  Patient is not wanting to voluntarily go to inpatient psych facility.  Psychiatry consulted  Patient was on one-to-one observation and then was 302d as per psych recommendations.\"     U Admission note:     Pt is a 302 from [facility] " "floor. Pt reports taking tylenol PM with scheduled medication d/t having a cold. Sent a jhonatan text to ex  who pt lives with. Pt denies taking medication was a suicidal attempt. Denies text being a \"goodbye text\". Did not want to come in-patient. Cooperative during admission. Reports one suicide attempt in 20 years by OD. Pt denies HI or hallucination. Denies drug, etoh, and tobacco. UDS(+) Hydrocodone. Pt has history of diabetes, HTN, gastric bypass. Pt reports having spinal cord stimular that \"has been dead the past week). Reports plate/screws in neck and knee replacement history. Pt uses cane at home d/t limited mobility.      Outpatient:    Pt referred to [facility] - Appt scheduled with Joaquina Ojeda at the [place] location on 6/23 at 3:30pm.    Pt then reported she plans to seek MHOP Services at [facility] in [place] as she will be living out there most of the time. Pt provided with contact information on how to reach them for services.      ACT/ICM/CPS/WRT/SC: CM spoke to pt about [employer] in [facility] [place] for her to contact after dc to see if they can assist her with community resources.      PCP:      Santi Hendricks MD  [phone]       Work/Income:      SSD monthly    Legal/  Probation/[place] [employer]c:    Denies   Access to Firearms:    Denies   Referrals Needed: Referral to [facility] for MHOP as pt has Medicare and BCBS insurance.     Pt reported she will probably seek MHOP in [place] when she returns there. Pt has appropriate information to do so.      Transport at Discharge:    Pt will need transportation    IMM:   Jeremy Text HORACIO:    Emergency Contact:     Abundio Salgado (Son) [phone]    ROIs obtained:       None at time of intake.     Pt declined HORACIO for family/friends.     Pt reported she has been in contact with family while in the hospital.      Insurance:     Blue Cross Blue Shield   Medicare A&B Secondary      Audit:     "    PAWSS:  BAT:  UDS: + Hydrocodone

## 2025-05-15 NOTE — DISCHARGE INSTR - APPOINTMENTS
You are being discharged to [facility] [address] [address]   You confirmed that your cell phone number is [phone]        Behavioral Health Nurse Navigator, Daria or Kimberly will be calling you after your discharge, on the phone number that you provided.  They will be available as an additional support, if needed.   If you wish to speak with Daria, you may contact her at [phone].

## 2025-05-15 NOTE — PROGRESS NOTES
Diagnosis of Moderate episode of recurrent major depressive disorder reviewed.   Short term goals for decrease in depressive symptoms, decrease in anxiety symptoms, decrease in suicidal thoughts, improvement in self care, sleep improvement discussed.   All present parties in agreement and treatment plan signed.    05/16/25 1520   Team Meeting   Meeting Type Tx Team Meeting   Team Members Present   Team Members Present Physician;Nurse;   Physician Team Member Dr. Boyle   Nursing Team Member [facility] Management Team Member Daisha   Patient/Family Present   Patient Present No   Patient's Family Present No

## 2025-05-15 NOTE — ASSESSMENT & PLAN NOTE
Lab Results   Component Value Date    HGBA1C 6.7 (H) 05/11/2025       Recent Labs     05/14/25  1103 05/14/25  2142 05/15/25  0806 05/15/25  1105   POCGLU 90 98 107 76       Blood Sugar Average: Last 72 hrs:  (P) 92.75

## 2025-05-15 NOTE — CMS CERTIFICATION NOTE
Recertification: Based upon physical, mental and social evaluations, I certify that inpatient psychiatric services continue to be medically necessary for this patient for a duration of 6 midnights for the treatment of  Moderate episode of recurrent major depressive disorder (HCC) Available alternative community resources still do not meet the patient's mental health care needs. I further attest that an established written individualized plan of care has been updated and is outlined in the patient's medical records.

## 2025-05-15 NOTE — NURSING NOTE
Pt reports a migraine 10/10 pain. Pt reports having this for a few days. Pt reports her doctor PTA was trying different medications with her for this issue. Pt was given motrin 800 @ 1700. Upon follow up pt reports PRN was not effective. Pt appears pale. Pt reports not eating anything all day due to stomach pain. Pt reports no BM for 5 days. Pt reports getting miralax in the ED PTA however, it did not work. RN offered miralax and suppository and pt declined at this time. Vitals stable. glucose check was 98. SLIM made aware.   @2350 pt took OTD of Fioricet 2 tabs prescribed by SLIM.

## 2025-05-15 NOTE — NURSING NOTE
Pt briefly tearful this morning. Reports son has finals this week and his finacee is graduating today. This writer assisted pt to get number out of phone. Pt continues to deny SI/HI or hallucination. Reports plan to shower, provided with shower chair. Pt withdrawn to room. Guarded and scant.

## 2025-05-15 NOTE — ASSESSMENT & PLAN NOTE
Patient originally hospitalized at [facility] in the ICU for intentional overdose including gabapentin, Dilaudid, codeine, Tylenol, Robaxin, Klonopin and Cymbalta.  Depression treatment per psychiatry.
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
## 2025-05-15 NOTE — NURSING NOTE
Patient remained in bed throughout the evening. Denies SI/HI/AVH. Staff offered to bring her something to eat, but she stated she wasn't hungry. Patient stated she has had a migraine for 29 days in a row, and appeared very pale. VS: 127/79, pulse 83. Blood sugar check at HS was 98. Ice pack and crackers offered, but she refused. Walker placed in room for ambulation assistance.

## 2025-05-15 NOTE — ASSESSMENT & PLAN NOTE
Severe iron deficiency as evidenced by ferritin level of 7, total iron of 22 and sat of 5% (5/11/25).  Patient would benefit from iron infusion as an out-patient.  In the meantime, recommend ferrous sulfate 325 mg at bedtime in addition to Vitamin C 500 mg at bedtime to aid in absorption.

## 2025-05-15 NOTE — ASSESSMENT & PLAN NOTE
Lab Results   Component Value Date    HGBA1C 6.7 (H) 05/11/2025     Recent Labs     05/14/25  0630 05/14/25  1103 05/14/25  2142 05/15/25  0806   POCGLU 97 90 98 107     Blood Sugar Average: Last 72 hrs:  (P) [id number]  Blood sugars are well controlled.  Continue accu-checks and SSI

## 2025-05-16 LAB
GLUCOSE SERPL-MCNC: 138 MG/DL (ref 65–140)
GLUCOSE SERPL-MCNC: 174 MG/DL (ref 65–140)
GLUCOSE SERPL-MCNC: 47 MG/DL (ref 65–140)
GLUCOSE SERPL-MCNC: 86 MG/DL (ref 65–140)
GLUCOSE SERPL-MCNC: 95 MG/DL (ref 65–140)

## 2025-05-16 PROCEDURE — 82948 REAGENT STRIP/BLOOD GLUCOSE: CPT

## 2025-05-16 RX ORDER — DEXAMETHASONE 0.5 MG/1
0.5 TABLET ORAL DAILY
Status: DISCONTINUED | OUTPATIENT
Start: 2025-05-17 | End: 2025-05-19 | Stop reason: HOSPADM

## 2025-05-16 RX ORDER — DEXAMETHASONE 0.5 MG/1
1 TABLET ORAL ONCE
Status: COMPLETED | OUTPATIENT
Start: 2025-05-16 | End: 2025-05-16

## 2025-05-16 RX ADMIN — FERROUS GLUCONATE 324 MG: 324 TABLET ORAL at 21:14

## 2025-05-16 RX ADMIN — GABAPENTIN 300 MG: 300 CAPSULE ORAL at 09:41

## 2025-05-16 RX ADMIN — SENNOSIDES AND DOCUSATE SODIUM 1 TABLET: 50; 8.6 TABLET ORAL at 12:59

## 2025-05-16 RX ADMIN — PANTOPRAZOLE SODIUM 20 MG: 20 TABLET, DELAYED RELEASE ORAL at 06:20

## 2025-05-16 RX ADMIN — DEXAMETHASONE 1 MG: 0.5 TABLET ORAL at 17:14

## 2025-05-16 RX ADMIN — GABAPENTIN 300 MG: 300 CAPSULE ORAL at 21:14

## 2025-05-16 RX ADMIN — METHOCARBAMOL 750 MG: 500 TABLET ORAL at 21:38

## 2025-05-16 RX ADMIN — GABAPENTIN 1200 MG: 400 CAPSULE ORAL at 21:13

## 2025-05-16 RX ADMIN — PANTOPRAZOLE SODIUM 20 MG: 20 TABLET, DELAYED RELEASE ORAL at 16:34

## 2025-05-16 RX ADMIN — Medication 3 MG: at 21:13

## 2025-05-16 RX ADMIN — RIMEGEPANT SULFATE 75 MG: 75 TABLET, ORALLY DISINTEGRATING ORAL at 09:41

## 2025-05-16 RX ADMIN — ATORVASTATIN CALCIUM 20 MG: 20 TABLET, FILM COATED ORAL at 16:34

## 2025-05-16 RX ADMIN — DULOXETINE HYDROCHLORIDE 90 MG: 30 CAPSULE, DELAYED RELEASE ORAL at 17:14

## 2025-05-16 RX ADMIN — OXYCODONE HYDROCHLORIDE AND ACETAMINOPHEN 500 MG: 500 TABLET ORAL at 21:14

## 2025-05-16 RX ADMIN — GABAPENTIN 300 MG: 300 CAPSULE ORAL at 16:34

## 2025-05-16 NOTE — PLAN OF CARE
Problem: Knowledge Deficit  Goal: Patient/family/caregiver demonstrates understanding of disease process, treatment plan, medications, and discharge instructions  Description: Complete learning assessment and assess knowledge base.  Interventions:  - Provide teaching at level of understanding  - Provide teaching via preferred learning methods  Outcome: Progressing     Problem: DEPRESSION  Goal: Will be euthymic at discharge  Description: INTERVENTIONS:  - Administer medication as ordered  - Provide emotional support via 1:1 interaction with staff  - Encourage involvement in milieu/groups/activities  - Monitor for social isolation  Outcome: Progressing     Problem: ANXIETY  Goal: Will report anxiety at manageable levels  Description: INTERVENTIONS:  - Administer medication as ordered  - Teach and encourage coping skills  - Provide emotional support  - Assess patient/family for anxiety and ability to cope  Outcome: Progressing  Goal: By discharge: Patient will verbalize 2 strategies to deal with anxiety  Description: Interventions:  - Identify any obvious source/trigger to anxiety  - Staff will assist patient in applying identified coping technique/skills  - Encourage attendance of scheduled groups and activities  Outcome: Progressing     Problem: INVOLUNTARY ADMIT  Goal: Will cooperate with staff recommendations and doctor's orders and will demonstrate appropriate behavior  Description: INTERVENTIONS:  - Treat underlying conditions and offer medication as ordered  - Educate regarding involuntary admission procedures and rules  - Utilize positive consistent limit setting strategies to support patient and staff safety  Outcome: Progressing

## 2025-05-16 NOTE — PROGRESS NOTES
05/16/25 0800   Team Meeting   Meeting Type Daily Rounds   Team Members Present   Team Members Present Physician;Nurse;;Other (Discipline and Name)   Physician Team Member Dr. Boyle / Dr. Almeida / WILLARD Phillips   Nursing Team Member Erica   Care Management Team Member Daisha   Other (Discipline and Name) Sigmund - Group Facilitator   Patient/Family Present   Patient Present No   Patient's Family Present No   Pt denies SI. Reported she has had a migraine for 30 days and constipated. Pt tearful at times but calm and cooperative. Pt interested in [facility]OP services upon dc.     DC: pt is on a 302 that expires on Sunday 5/18/25. Pt will sign 201 today 5/16 with plan to dc on Monday or Tuesday

## 2025-05-16 NOTE — NURSING NOTE
Pt is awake, pleasant during assessment. Writer initially met with pt due to hypoglycemia, BS: 47mg/dl. Pt c/o dizziness, HA, pale in color. Pt provided with 4oz NISHA and vivek velez. 20 min later, recheck completed, BS: 86mg/dl. Pt then ate lunch at bedside. SLIM made aware via EPIC chat. Pt reported feeling better after eating lunch. Pt reported that OP provider had restarted pt on Ozempic 0.25mg last month. Pt then tried to get up to make phone call and felt dizzy again, sat on roommates bed as pt reports that the room was spinning. Assisted pt back to their bed and pt is currently resting in bed. Pt denies SI, HI, AVH, mild anxiety, depression.

## 2025-05-16 NOTE — ASSESSMENT & PLAN NOTE
Continue Cymbalta 90 mg at bedtime.    his is 54-year-old female with history of depression/anxiety and multiple medical comorbidities admitted to inpatient unit on 302 for overdosing on pills.  Patient reports improvement in mood and agreeable to convert 302 in to voluntary status.

## 2025-05-16 NOTE — PROGRESS NOTES
Progress Note - Behavioral Health   Name: Marva Salgado 54 y.o. female I MRN: [id number]  Unit/Bed#: -02 I Date of Admission: 5/14/2025   Date of Service: 5/16/2025 I Hospital Day: 2     Assessment & Plan  Moderate episode of recurrent major depressive disorder (HCC)  Continue Cymbalta 90 mg at bedtime.    his is 54-year-old female with history of depression/anxiety and multiple medical comorbidities admitted to inpatient unit on 302 for overdosing on pills.  Patient reports improvement in mood and agreeable to convert 302 in to voluntary status.   Essential hypertension  As per medicine recommendations  Type 2 diabetes mellitus without complication, without long-term current use of insulin (HCC)  Lab Results   Component Value Date    HGBA1C 6.7 (H) 05/11/2025       Recent Labs     05/15/25  2051 05/16/25  0811 05/16/25  1111 05/16/25  1131   POCGLU 102 95 47* 86       Blood Sugar Average: Last 72 hrs:  (P) [id number]    Intentional overdose (HCC)  As per medicine recommendations  H/O gastric bypass  As per medicine recommendations  Iron deficiency anemia secondary to inadequate dietary iron intake  As per medicine recommendations  Intractable chronic migraine without aura and with status migrainosus  As per medicine recommendations  Medical clearance for psychiatric admission  Consult medicine    Recommended Treatment: Continue with pharmacotherapy, group therapy, milieu therapy and occupational therapy.   Risks, benefits and possible side effects of Medications:   Risks, benefits, alternatives, and possible side effects of patient's psychiatric medications were discussed with patient.    Progress Toward Goals: Progressing. Patient is not at goal. They are not yet ready for discharge. The patient's condition currently requires active psychopharmacological medication management, interdisciplinary coordination with case management, and the utilization of adjunctive milieu and group therapy to augment  psychopharmacological efficacy. The patient's risk of morbidity, and progression or decompensation of psychiatric disease, is higher without this current treatment.    Subjective: Patient was seen, chart was reviewed, and case was discussed with the team.  Patient appears calm cooperative, withdrawn and seclusive.  Endorses dizziness upon standing from bed and continues to have migraine headaches.  Her blood sugars are also on the lower side.  Healthy life skills and orthostatic hypotension management was discussed.  Anxiety is fluctuating.  Denies any thoughts of hurting self or others.     Patient is compliant with medications. Patient denied adverse effects to their current psychiatric medication regimen. Discussed the importance of continuing to take medications as prescribed, as well as the importance of continuing to attend groups on the unit.    Behavior over the last 24 hours:  slowly improving  Sleep: normal  Appetite: normal  Medication side effects: No    Medical ROS: Pertinent items are noted in HPI.all other systems are negative      Scheduled medications:  Current Facility-Administered Medications   Medication Dose Route Frequency Provider Last Rate    aluminum-magnesium hydroxide-simethicone  30 mL Oral Q4H PRN Ngoc Maravilla MD      ascorbic acid  500 mg Oral HS Yamileth You PA-C      atorvastatin  20 mg Oral Daily With Dinner Clarke Stevens PA-C      haloperidol lactate  2.5 mg Intramuscular Q4H PRN Max 4/day Ngoc Maravilla MD      And    LORazepam  1 mg Intramuscular Q4H PRN Max 4/day Ngoc Maravilla MD      And    benztropine  0.5 mg Intramuscular Q4H PRN Max 4/day Ngoc Maravilla MD      haloperidol lactate  5 mg Intramuscular Q4H PRN Max 4/day Ngoc Maravilla MD      And    LORazepam  2 mg Intramuscular Q4H PRN Max 4/day Ngoc Maravilla MD      And    benztropine  1 mg Intramuscular Q4H PRN Max 4/day Ngoc Maravilla MD      benztropine  1 mg Intramuscular Q4H PRN Max 6/day  Ngoc Maravilla MD      benztropine  1 mg Oral Q4H PRN Max 6/day Ngoc Maravilla MD      bisacodyl  10 mg Rectal Daily PRN Ngoc Maravilla MD      [START ON 5/17/2025] dexamethasone  0.5 mg Oral Daily Cat Lewis PA-C      dexamethasone  1 mg Oral Once Cat Lewis PA-C      hydrOXYzine HCL  50 mg Oral Q6H PRN Max 4/day Ngoc Maravilla MD      Or    diphenhydrAMINE  50 mg Intramuscular Q6H PRN Ngoc Maravilla MD      DULoxetine  90 mg Oral QPM Clarke Stevens PA-C      ferrous gluconate  324 mg Oral HS Yamileth You PA-C      gabapentin  300 mg Oral TID Clarke Stevens PA-C      And    gabapentin  1,200 mg Oral HS Clarke Stevens PA-C      haloperidol  1 mg Oral Q6H PRN Ngoc Maravilla MD      haloperidol  2.5 mg Oral Q4H PRN Max 4/day Ngoc Maravilla MD      haloperidol  5 mg Oral Q4H PRN Max 4/day Ngoc Maravilla MD      hydrOXYzine HCL  100 mg Oral Q6H PRN Max 4/day Ngoc Maravilla MD      Or    LORazepam  2 mg Intramuscular Q6H PRN Ngoc Maravilla MD      hydrOXYzine HCL  25 mg Oral Q6H PRN Max 4/day Ngoc Maravilla MD      insulin lispro  1-5 Units Subcutaneous TID AC Clarke Stevens PA-C      insulin lispro  1-5 Units Subcutaneous HS Clarke Stevens PA-C      ketorolac  10 mg Oral Q6H PRN Yamileth You PA-C      lactulose  20 g Oral Daily PRN Yamileth You PA-C      melatonin  3 mg Oral HS Ngoc Maravilla MD      methocarbamol  750 mg Oral Q6H PRN Yamileth You PA-C      pantoprazole  20 mg Oral BID AC Clarke Stevens PA-C      polyethylene glycol  17 g Oral Daily PRN Ngoc Maravilla MD      propranolol  10 mg Oral Q8H PRN Ngoc Maravilla MD      rimegepant sulfate  75 mg Oral Daily Clarke Stevens PA-C      senna-docusate sodium  1 tablet Oral Daily PRN Ngoc Maravilla MD      traZODone  50 mg Oral HS PRN Ngoc Maravilla MD        PRN:    aluminum-magnesium hydroxide-simethicone    haloperidol lactate **AND** LORazepam **AND**  benztropine    haloperidol lactate **AND** LORazepam **AND** benztropine    benztropine    benztropine    bisacodyl    hydrOXYzine HCL **OR** diphenhydrAMINE    haloperidol    haloperidol    haloperidol    hydrOXYzine HCL **OR** LORazepam    hydrOXYzine HCL    ketorolac    lactulose    methocarbamol    polyethylene glycol    propranolol    senna-docusate sodium    traZODone    - Observation: routine            - VS: as per unit protocol  - Legal status: 201  - Diet: Regular diet  - Encourage group attendance and milieu therapy  - Dispo: To be determined        Objective    Current Mental Status Evaluation:  Appearance and attitude: appeared as stated age, dressed in hospital attire, with good hygiene  Eye contact: fair  Motor Function: within normal limits, intact gait, No PMA/PMR  Gait/station: normal gait/station  Speech: talking in soft tone, with normal latency and amount  Language: No overt abnormality  Mood/affect: anxious / Affect was euthymic, reactive, in full range, normal intensity and mood congruent  Thought Processes: sequential and goal-directed, logical, coherent  Thought content: denies suicidal ideation or homicidal ideation; no delusions or first rank symptoms  Associations: intact associations  Perceptual disturbances: denies Auditory/Visual/Tactile Hallucinations  Orientation: oriented to time, person, place and to the situational context  Cognitive Function: intact  Memory: recent and remote memory grossly intact  Intellect: average  Fund of knowledge: aware of current events, aware of past history, and vocabulary average  Impulse control: fair  Insight/judgment: fair/fair      Vital signs in last 24 hours:    Temp:  [97.6 °F (36.4 °C)-98.6 °F (37 °C)] 97.6 °F (36.4 °C)  HR:  [80-97] 97  BP: (102-131)/(64-71) 102/64  Resp:  [16-20] 20  SpO2:  [96 %] 96 %  O2 Device: None (Room air)    Lab Results: I have reviewed the following results:   Most Recent Labs:   Lab Results   Component Value Date     WBC 6.44 05/15/2025    RBC 4.07 05/15/2025    HGB 9.2 (L) 05/15/2025    HCT 32.4 (L) 05/15/2025     05/15/2025    RDW 18.4 (H) 05/15/2025    NEUTROABS 3.16 05/15/2025    SODIUM 143 05/15/2025    K 4.0 05/15/2025     05/15/2025    CO2 29 05/15/2025    BUN 8 05/15/2025    CREATININE 0.82 05/15/2025    GLUC 95 05/15/2025    GLUF 95 05/15/2025    CALCIUM 8.9 05/15/2025    AST 15 05/15/2025    ALT 18 05/15/2025    ALKPHOS 79 05/15/2025    TP 5.6 (L) 05/15/2025    ALB 3.5 05/15/2025    TBILI 0.54 05/15/2025    CHOLESTEROL 138 05/15/2025    HDL 65 05/15/2025    TRIG 74 05/15/2025    LDLCALC 58 05/15/2025    NONHDLC 73 05/15/2025    NLD8VDMSQNOU 4.311 05/13/2025    FREET4 1.07 03/07/2017    PREGSERUM Negative 05/15/2025    HCGQUANT <2 03/07/2017    HGBA1C 6.7 (H) 05/11/2025     05/11/2025       Counseling / Coordination of Care  Patient's progress discussed with staff in treatment team meeting.  Medications, treatment progress and treatment plan reviewed with patient.  Medication education provided to patient.  Supportive therapy provided to patient.  Cognitive techniques utilized during the session.  Reassurance and supportive therapy provided.  Encouraged participation in milieu and group therapy on the unit.  Crisis/safety plan discussed with patient.        This note was completed in part utilizing Dragon dictation Software. Grammatical, translation, syntax errors, random word insertions, spelling mistakes, and incomplete sentences may be an occasional consequence of this system secondary to software limitations with voice recognition, ambient noise, and hardware issues. If you have any questions or concerns about the content, text, or information contained within the body of this dictation, please contact the provider for clarification.

## 2025-05-16 NOTE — ASSESSMENT & PLAN NOTE
Lab Results   Component Value Date    HGBA1C 6.7 (H) 05/11/2025       Recent Labs     05/15/25  2051 05/16/25  0811 05/16/25  1111 05/16/25  1131   POCGLU 102 95 47* 86       Blood Sugar Average: Last 72 hrs:  (P) [id number]
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
## 2025-05-16 NOTE — NURSING NOTE
Pt denies SI AVH at this time , will come to staff if feeling unsafe, assisted  with ADLs, pleasant and cooperative

## 2025-05-16 NOTE — CASE MANAGEMENT
CM attempted to meet with pt to check in and complete intake but pt in bed sleeping with covers over her face. Pt did not respond to name. CM will try again at another time.

## 2025-05-17 LAB
BACTERIA BLD CULT: NORMAL
BACTERIA BLD CULT: NORMAL
GLUCOSE SERPL-MCNC: 106 MG/DL (ref 65–140)
GLUCOSE SERPL-MCNC: 107 MG/DL (ref 65–140)
GLUCOSE SERPL-MCNC: 119 MG/DL (ref 65–140)
GLUCOSE SERPL-MCNC: 138 MG/DL (ref 65–140)

## 2025-05-17 PROCEDURE — 82948 REAGENT STRIP/BLOOD GLUCOSE: CPT

## 2025-05-17 RX ORDER — DULOXETIN HYDROCHLORIDE 30 MG/1
90 CAPSULE, DELAYED RELEASE ORAL EVERY EVENING
Status: DISCONTINUED | OUTPATIENT
Start: 2025-05-17 | End: 2025-05-19 | Stop reason: HOSPADM

## 2025-05-17 RX ORDER — METHOCARBAMOL 500 MG/1
1000 TABLET, FILM COATED ORAL EVERY 6 HOURS PRN
Status: DISCONTINUED | OUTPATIENT
Start: 2025-05-17 | End: 2025-05-19 | Stop reason: HOSPADM

## 2025-05-17 RX ADMIN — GABAPENTIN 1200 MG: 400 CAPSULE ORAL at 21:59

## 2025-05-17 RX ADMIN — DULOXETINE HYDROCHLORIDE 90 MG: 30 CAPSULE, DELAYED RELEASE ORAL at 20:55

## 2025-05-17 RX ADMIN — PANTOPRAZOLE SODIUM 20 MG: 20 TABLET, DELAYED RELEASE ORAL at 06:03

## 2025-05-17 RX ADMIN — ATORVASTATIN CALCIUM 20 MG: 20 TABLET, FILM COATED ORAL at 16:32

## 2025-05-17 RX ADMIN — PANTOPRAZOLE SODIUM 20 MG: 20 TABLET, DELAYED RELEASE ORAL at 16:31

## 2025-05-17 RX ADMIN — GABAPENTIN 300 MG: 300 CAPSULE ORAL at 09:09

## 2025-05-17 RX ADMIN — POLYETHYLENE GLYCOL 3350 17 G: 17 POWDER, FOR SOLUTION ORAL at 00:02

## 2025-05-17 RX ADMIN — GABAPENTIN 300 MG: 300 CAPSULE ORAL at 16:31

## 2025-05-17 RX ADMIN — DEXAMETHASONE 0.5 MG: 0.5 TABLET ORAL at 09:09

## 2025-05-17 RX ADMIN — METHOCARBAMOL 1000 MG: 500 TABLET ORAL at 21:59

## 2025-05-17 RX ADMIN — KETOROLAC TROMETHAMINE 10 MG: 10 TABLET, FILM COATED ORAL at 02:41

## 2025-05-17 RX ADMIN — SENNOSIDES AND DOCUSATE SODIUM 1 TABLET: 50; 8.6 TABLET ORAL at 17:19

## 2025-05-17 RX ADMIN — OXYCODONE HYDROCHLORIDE AND ACETAMINOPHEN 500 MG: 500 TABLET ORAL at 22:00

## 2025-05-17 RX ADMIN — GABAPENTIN 300 MG: 300 CAPSULE ORAL at 20:55
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
## 2025-05-17 NOTE — TREATMENT TEAM
05/17/25 1100   Team Meeting   Meeting Type Daily Rounds   Team Members Present   Team Members Present Physician;Nurse   Physician Team Member Juan Pablo/Phillips   Nursing Team Member Erica   Patient/Family Present   Patient Present No   Patient's Family Present No       AM rounds- denies SI/HI, reports having anxiety and depression due to remaining inpatient. Pt reports headache and received prn toradol for this. Poor sleep overnight however did nap frequently throughout the day. Blood sugars was low at lunch time however corrected after eating meal.

## 2025-05-17 NOTE — PROGRESS NOTES
Progress Note - Behavioral Health   Name: Marva Salgado 54 y.o. female I MRN: [id number]  Unit/Bed#: -02 I Date of Admission: 5/14/2025   Date of Service: 5/17/2025 I Hospital Day: 3        Assessment & Plan  Moderate episode of recurrent major depressive disorder (HCC)  Continue Cymbalta 90 mg at bedtime.    This is 54-year-old female with history of depression/anxiety and multiple medical comorbidities admitted to inpatient unit on 302 for overdosing on pills.  Patient reports improvement in mood and agreeable to convert 302 in to voluntary status. Denies that overdose was a suicide attempt.  Essential hypertension  As per medicine recommendations  Type 2 diabetes mellitus without complication, without long-term current use of insulin (HCC)  Lab Results   Component Value Date    HGBA1C 6.7 (H) 05/11/2025       Recent Labs     05/16/25  1608 05/16/25  2246 05/17/25  0802 05/17/25  1106   POCGLU 174* 138 107 106       Blood Sugar Average: Last 72 hrs:  (P) [id number]    Intentional overdose (HCC)  As per medicine recommendations  H/O gastric bypass  As per medicine recommendations  Iron deficiency anemia secondary to inadequate dietary iron intake  As per medicine recommendations  Intractable chronic migraine without aura and with status migrainosus  As per medicine recommendations  Medical clearance for psychiatric admission  Consult medicine     Current medications:  Current Facility-Administered Medications   Medication Dose Route Frequency Provider Last Rate    aluminum-magnesium hydroxide-simethicone  30 mL Oral Q4H PRN Ngoc Maravilla MD      ascorbic acid  500 mg Oral HS Yamileth You PA-C      atorvastatin  20 mg Oral Daily With Dinner Clarke Stevens PA-C      haloperidol lactate  2.5 mg Intramuscular Q4H PRN Max 4/day Ngoc Maravilla MD      And    LORazepam  1 mg Intramuscular Q4H PRN Max 4/day Ngoc Maravilla MD      And    benztropine  0.5 mg Intramuscular Q4H PRN Max 4/day  Ngoc Maravilla MD      haloperidol lactate  5 mg Intramuscular Q4H PRN Max 4/day Ngoc Maravilla MD      And    LORazepam  2 mg Intramuscular Q4H PRN Max 4/day Ngoc Maravilla MD      And    benztropine  1 mg Intramuscular Q4H PRN Max 4/day Ngoc Maravilla MD      benztropine  1 mg Intramuscular Q4H PRN Max 6/day Ngoc Maravilla MD      benztropine  1 mg Oral Q4H PRN Max 6/day Ngoc Maravilla MD      bisacodyl  10 mg Rectal Daily PRN Ngoc Maravilla MD      dexamethasone  0.5 mg Oral Daily Cat Lewis PA-C      hydrOXYzine HCL  50 mg Oral Q6H PRN Max 4/day Ngoc Maravilla MD      Or    diphenhydrAMINE  50 mg Intramuscular Q6H PRN Ngoc Maravilla MD      DULoxetine  90 mg Oral QPM Clarke Stevens PA-C      ferrous gluconate  324 mg Oral HS Yamileth You PA-C      gabapentin  300 mg Oral TID Clarke Stevens PA-C      And    gabapentin  1,200 mg Oral HS Clarke Stevens PA-C      haloperidol  1 mg Oral Q6H PRN Ngoc Maravilla MD      haloperidol  2.5 mg Oral Q4H PRN Max 4/day Ngoc Maravilla MD      haloperidol  5 mg Oral Q4H PRN Max 4/day Ngoc Maravilla MD      hydrOXYzine HCL  100 mg Oral Q6H PRN Max 4/day Ngoc Maravilla MD      Or    LORazepam  2 mg Intramuscular Q6H PRN Ngoc Maravilla MD      hydrOXYzine HCL  25 mg Oral Q6H PRN Max 4/day Ngoc Maravilla MD      insulin lispro  1-5 Units Subcutaneous TID AC Clarke Stevens PA-C      insulin lispro  1-5 Units Subcutaneous HS Clarke Stevens PA-C      ketorolac  10 mg Oral Q6H PRN Yamileth You PA-C      lactulose  20 g Oral Daily PRN Yamileth You PA-C      melatonin  3 mg Oral HS Ngoc Maravilla MD      methocarbamol  750 mg Oral Q6H PRN Yamileth You PA-C      pantoprazole  20 mg Oral BID AC Clarke Stevens PA-C      polyethylene glycol  17 g Oral Daily PRN Ngoc Maravilla MD      propranolol  10 mg Oral Q8H PRN Ngoc Maravilla MD      rimegepant sulfate  75 mg Oral Daily Clarke Stevens PA-C       "senna-docusate sodium  1 tablet Oral Daily PRN Ngoc Maravilla MD      traZODone  50 mg Oral HS PRN Ngoc Maravilla MD         Risks / Benefits of Treatment:    Risks, benefits, and possible side effects of medications explained to patient and patient verbalizes understanding and agreement for treatment.    Progress Toward Goals: progressing    Treatment Planning:  All current active medications have been reviewed.  Continue to monitor response to treatment and assess for potential side effects of medications.  Encourage group therapy, milieu therapy and occupational therapy  Collaboration with medical service for medical comorbidities as indicated.  Behavioral Health checks for safety monitoring.  Estimated Discharge Day:     Subjective:    Behavior over the last 24 hours: unchanged    No acute behavioral events over the past 24 hours. Today, patient was seen and examined at bedside for continuation of care.     This is a 54-year-old female with a history of MDD, chronic pain and multiple medical comorbidities who presents for psychiatric follow-up.  Upon approach, the patient is pleasant, calm and cooperative, though she is hyper fixated on her various medical issues and somatic complaints.  She is asking to discharge today so that she can follow up with her chronic pain doctor in [place], [place] on Monday morning.  She requires frequent redirection away from her somatic complaints and to a more focus conversation on her psychiatric status.  She tells me that she is not depressed, anxious or upset.  She denies that her reported overdose prior to admission represented a suicide attempt.  She states, \"why would I want to kill myself?  I need to get my medical issues taken care of, I have so many doctors appointments to go to.  If I was suicidal, I would just not go to these appointments and refused my medications, but I am not doing that.\"  She is intently focused on discharge.  She is asking her " "medications be moved from 6:30 at night to 8 PM.  Continues to deny SI and HI.  Continues to deny AH and VH.    Patient denied other new or worsening psychiatric symptoms/complaints at this time.    Sleep: unchanged  Appetite: unchanged  Medication side effects: none reported  ROS: review of systems as noted in HPI, all other systems are negative    Objective:    Vital signs in last 24 hours:    Temp:  [97.8 °F (36.6 °C)-97.9 °F (36.6 °C)] 97.9 °F (36.6 °C)  HR:  [78-88] 78  BP: (102-150)/(63-64) 150/63  Resp:  [18] 18  SpO2:  [97 %-98 %] 98 %  O2 Device: None (Room air)    Mental Status Evaluation:    Appearance: Hospital attire, appears consistent with stated age, normal grooming  Motor: no psychomotor disturbances, no gait abnormalities  Behavior: Pleasant, calm, cooperative, interacts with this writer appropriately  Speech: normal rate, rhythm, and volume  Mood: \"I have so much physical pain\"  Affect: appropriate, normal range and intensity, mood-congruent  Thought Process: Perseverative on medical issues, circumstantial requiring redirection  Thought Content: denies any delusional material, + intense somatic preoccupation  Perception: denies any auditory or visual hallucinations, denies other perceptual disturbances  Risk Potential: Status post overdose prior to admission, denies that this represented a suicide attempt, currently denies suicidal ideation, plan, or intent. Denies homicidal ideation  Sensorium: Oriented to person, place, time, and situation  Cognition: cognitive ability appears intact but was not quantitatively tested  Consciousness: alert and awake  Attention/Concentration: able to focus without difficulty, attention and concentration are age appropriate  Insight: Limited  Judgement: Fair    Lab Results: I have reviewed the following results:  Recent Results (from the past 48 hours)   Fingerstick Glucose (POCT)    Collection Time: 05/15/25  4:03 PM   Result Value Ref Range    POC Glucose 98 65 - " 140 mg/dl   Fingerstick Glucose (POCT)    Collection Time: 05/15/25  8:51 PM   Result Value Ref Range    POC Glucose 102 65 - 140 mg/dl   Fingerstick Glucose (POCT)    Collection Time: 05/16/25  8:11 AM   Result Value Ref Range    POC Glucose 95 65 - 140 mg/dl   Fingerstick Glucose (POCT)    Collection Time: 05/16/25 11:11 AM   Result Value Ref Range    POC Glucose 47 (LL) 65 - 140 mg/dl   Fingerstick Glucose (POCT)    Collection Time: 05/16/25 11:31 AM   Result Value Ref Range    POC Glucose 86 65 - 140 mg/dl   Fingerstick Glucose (POCT)    Collection Time: 05/16/25  4:08 PM   Result Value Ref Range    POC Glucose 174 (H) 65 - 140 mg/dl   Fingerstick Glucose (POCT)    Collection Time: 05/16/25 10:46 PM   Result Value Ref Range    POC Glucose 138 65 - 140 mg/dl   Fingerstick Glucose (POCT)    Collection Time: 05/17/25  8:02 AM   Result Value Ref Range    POC Glucose 107 65 - 140 mg/dl   Fingerstick Glucose (POCT)    Collection Time: 05/17/25 11:06 AM   Result Value Ref Range    POC Glucose 106 65 - 140 mg/dl        Administrative Statements    Counseling / Coordination of Care:     Patients progress discussed with staff in treatment team meeting. Medication changes reviewed with staff in treatment team meeting.    Anton Phillips PA-C 05/17/25    This note has been constructed using a voice recognition system. There may be translation, syntax, or grammatical errors. If you have any questions, please contact the dictating provider.

## 2025-05-17 NOTE — ASSESSMENT & PLAN NOTE
Continue Cymbalta 90 mg at bedtime.    This is 54-year-old female with history of depression/anxiety and multiple medical comorbidities admitted to inpatient unit on 302 for overdosing on pills.  Patient reports improvement in mood and agreeable to convert 302 in to voluntary status. Denies that overdose was a suicide attempt.

## 2025-05-17 NOTE — NURSING NOTE
While rounding, patient was laying in her bed and began to complain to writer of a backache and headache. Pt also complained about how hard the beds are. Writer asked pt if she took anything for pain and was informed pt took Toroidal at around 0245.This conversation occurred around 0315. Writer explained to pt that she may have to wait for the medication to fully kick in. Pt asked writer if she could go to the ER, writer politely told her no. Pt then asked if she could lay in the bench in the hallway, to which writer also told her no. Writer provided her with a hot pack and walked away. Pt decided to leave her room and lay on the bench by the phone anyway, disregarding writer's instructions.

## 2025-05-17 NOTE — PLAN OF CARE
Problem: DISCHARGE PLANNING  Goal: Discharge to home or other facility with appropriate resources  Description: INTERVENTIONS:  - Identify barriers to discharge w/patient and caregiver  - Arrange for needed discharge resources and transportation as appropriate  - Identify discharge learning needs (meds, wound care, etc.)  - Arrange for interpretive services to assist at discharge as needed  - Refer to Case Management Department for coordinating discharge planning if the patient needs post-hospital services based on physician/advanced practitioner order or complex needs related to functional status, cognitive ability, or social support system  Outcome: Progressing     Problem: Knowledge Deficit  Goal: Patient/family/caregiver demonstrates understanding of disease process, treatment plan, medications, and discharge instructions  Description: Complete learning assessment and assess knowledge base.  Interventions:  - Provide teaching at level of understanding  - Provide teaching via preferred learning methods  Outcome: Progressing     Problem: DEPRESSION  Goal: Will be euthymic at discharge  Description: INTERVENTIONS:  - Administer medication as ordered  - Provide emotional support via 1:1 interaction with staff  - Encourage involvement in milieu/groups/activities  - Monitor for social isolation  Outcome: Progressing     Problem: ANXIETY  Goal: Will report anxiety at manageable levels  Description: INTERVENTIONS:  - Administer medication as ordered  - Teach and encourage coping skills  - Provide emotional support  - Assess patient/family for anxiety and ability to cope  Outcome: Progressing  Goal: By discharge: Patient will verbalize 2 strategies to deal with anxiety  Description: Interventions:  - Identify any obvious source/trigger to anxiety  - Staff will assist patient in applying identified coping technique/skills  - Encourage attendance of scheduled groups and activities  Outcome: Progressing     Problem:  INVOLUNTARY ADMIT  Goal: Will cooperate with staff recommendations and doctor's orders and will demonstrate appropriate behavior  Description: INTERVENTIONS:  - Treat underlying conditions and offer medication as ordered  - Educate regarding involuntary admission procedures and rules  - Utilize positive consistent limit setting strategies to support patient and staff safety  Outcome: Progressing     Problem: Ineffective Coping  Goal: Participates in unit activities  Description: Interventions:  - Provide therapeutic environment   - Provide required programming   - Redirect inappropriate behaviors   Outcome: Not Progressing

## 2025-05-17 NOTE — NURSING NOTE
Patient awake for the majority of the night. Briefly fell asleep on bench in hallway but was redirected back to room. Pt stated she feels taking dexamethasone so close to bed is keeping her awake.

## 2025-05-17 NOTE — ASSESSMENT & PLAN NOTE
Lab Results   Component Value Date    HGBA1C 6.7 (H) 05/11/2025       Recent Labs     05/16/25  1608 05/16/25  2246 05/17/25  0802 05/17/25  1106   POCGLU 174* 138 107 106       Blood Sugar Average: Last 72 hrs:  (P) [id number]

## 2025-05-17 NOTE — NURSING NOTE
Pt social and laughing with peers. Needed redirection for inappropriate conversation. At nurses station waiting to speak with provider. Complaining beds are uncomfortable. Pt using to walker to walk around unit. Continues to deny SI/HI or hallucination.

## 2025-05-18 PROBLEM — Z00.8 MEDICAL CLEARANCE FOR PSYCHIATRIC ADMISSION: Status: RESOLVED | Noted: 2025-05-15 | Resolved: 2025-05-18

## 2025-05-18 PROBLEM — T50.902A INTENTIONAL OVERDOSE (HCC): Status: RESOLVED | Noted: 2025-05-11 | Resolved: 2025-05-18

## 2025-05-18 LAB
GLUCOSE SERPL-MCNC: 111 MG/DL (ref 65–140)
GLUCOSE SERPL-MCNC: 111 MG/DL (ref 65–140)
GLUCOSE SERPL-MCNC: 125 MG/DL (ref 65–140)
GLUCOSE SERPL-MCNC: 99 MG/DL (ref 65–140)

## 2025-05-18 PROCEDURE — 82948 REAGENT STRIP/BLOOD GLUCOSE: CPT

## 2025-05-18 RX ORDER — ZOLPIDEM TARTRATE 5 MG/1
10 TABLET ORAL
Status: DISCONTINUED | OUTPATIENT
Start: 2025-05-18 | End: 2025-05-19 | Stop reason: HOSPADM

## 2025-05-18 RX ADMIN — DULOXETINE HYDROCHLORIDE 90 MG: 30 CAPSULE, DELAYED RELEASE ORAL at 21:00

## 2025-05-18 RX ADMIN — ZOLPIDEM TARTRATE 10 MG: 5 TABLET, FILM COATED ORAL at 21:08

## 2025-05-18 RX ADMIN — GABAPENTIN 300 MG: 300 CAPSULE ORAL at 16:26

## 2025-05-18 RX ADMIN — ATORVASTATIN CALCIUM 20 MG: 20 TABLET, FILM COATED ORAL at 16:26

## 2025-05-18 RX ADMIN — GABAPENTIN 1200 MG: 400 CAPSULE ORAL at 21:08

## 2025-05-18 RX ADMIN — GABAPENTIN 300 MG: 300 CAPSULE ORAL at 08:23

## 2025-05-18 RX ADMIN — PANTOPRAZOLE SODIUM 20 MG: 20 TABLET, DELAYED RELEASE ORAL at 06:02

## 2025-05-18 RX ADMIN — PANTOPRAZOLE SODIUM 20 MG: 20 TABLET, DELAYED RELEASE ORAL at 16:26

## 2025-05-18 RX ADMIN — DEXAMETHASONE 0.5 MG: 0.5 TABLET ORAL at 08:23

## 2025-05-18 NOTE — BH TRANSITION RECORD
Contact Information: If you have any questions, concerns, pended studies, tests and/or procedures, or emergencies regarding your inpatient behavioral health visit. Please contact [place] [facility] [phone] and ask to speak to a , nurse or physician. A contact is available 24 hours/ 7 days a week at this number.     Summary of Procedures Performed During your Stay:  Below is a list of major procedures performed during your hospital stay and a summary of results:  - Cardiac Procedures/Studies: EKG - NSR.    Pending Studies (From admission, onward)      None          Please follow up on the above pending studies with your PCP and/or referring provider.

## 2025-05-18 NOTE — NURSING NOTE
"Marva is visible on the unit and social with peers. Needed redirection after found in a peer's room. She stated, \"I know, I know. They yelled at me about that yesterday too. I was just getting her phone number.\" Intrusive with roommate's care. Denies SI/HI/AVH. States she feels ready to go home tomorrow.   "

## 2025-05-18 NOTE — TREATMENT TEAM
05/18/25 0900   Team Meeting   Meeting Type Daily Rounds   Team Members Present   Team Members Present Physician;Nurse   Physician Team Member Juan Pablo/Phillips   Nursing Team Member Erica   Patient/Family Present   Patient Present No   Patient's Family Present No       AM rounds- denies SI/HI, needing some redirection for inappropriate comments with peers, going into other peers rooms and sitting on their bed and wanting extra juices. Educated on DM. Pt reports continued HA and requesting toradal. Irritable edge. Slept well.

## 2025-05-18 NOTE — NURSING NOTE
"While rounding after dinner, Writer noticed this patient was sitting on the bed in a room with two other patients.  When asked if this was her room, patient became angry that she was asked.  Writer stated that patients are not permitted to be in another patient's room.  Patient stated \"I didn't know about this rule!  That's why you're Mean My!\"  Patient left and went to her own room, grumbling along the way.  Within 30 minutes, patient asked Writer if she could speak to her and was told she'd have to wait about 15 minutes.  Eventually, patient approached Writer and said, \"I'm sorry, My\".  Appreciation was expressed by Writer.  While doing Wrapup Group, this patient stayed and participated, smiling and showing appreciation to this Writer - who was the .  Very different attitude from patient.  "

## 2025-05-18 NOTE — DISCHARGE SUMMARY
"Discharge Summary - Behavioral Health   Marva Salgado 54 y.o. female MRN: [id number]  Unit/Bed#: -02 Encounter: [id number]     Admission Date: 5/14/2025         Discharge Date: 5/19/25    Attending Psychiatrist: Lisa Sprague*    Assessment & Plan  Moderate episode of recurrent major depressive disorder (HCC)  Continue Cymbalta 90 mg at bedtime.    This is 54-year-old female with history of depression/anxiety and multiple medical comorbidities admitted to inpatient unit on 302 for overdosing on pills.  Patient reports improvement in mood and agreeable to convert 302 in to voluntary status. Denies that overdose was a suicide attempt.    Remains adamant that she did not attempt suicide prior to admission and continues to deny suicidal thoughts throughout her time here on the unit.  Has been sufficiently observed and treated and appears psychiatrically stable for discharge today 5/19/2025.  Essential hypertension  As per medicine recommendations  Type 2 diabetes mellitus without complication, without long-term current use of insulin ([facility])  Lab Results   Component Value Date    HGBA1C 6.7 (H) 05/11/2025       Recent Labs     05/18/25  1108 05/18/25  1601 05/18/25  2059 05/19/25  0809   POCGLU 99 111 125 140       Blood Sugar Average: Last 72 hrs:  (P) 114    H/O gastric bypass  As per medicine recommendations  Iron deficiency anemia secondary to inadequate dietary iron intake  As per medicine recommendations  Intractable chronic migraine without aura and with status migrainosus  As per medicine recommendations       Reason for Admission/HPI:     Per HPI from admission H&P obtained by Dr. Boyle on 5/15/25:    \"Per ED provider on 5/11:\"Marva Salgado is a 54 y.o. female presents after intentional overdose   Per patient's  who called EMS, he last saw her approximately 6:30 PM and subsequently received a text message approximately 7 PM that she had fed the dog and said goodbye after " "indicating she had taken a bunch of her medication.  Approximately 7:30 PM he found her snoring and unresponsive in bed, placed on the floor and initiated CPR as he was unable to palpate a strong pulse.  Unclear if patient experienced true out-of-hospital cardiac arrest.  Received 4 mg Narcan intranasal/IM with minimal improvement per EMS. GCS14 on arrival. Sleepy but rousable.  History limited as patient non verbal upon initial assessment.   All other systems reviewed and are negative\"        This is 54-year-old female with history of depression/anxiety and multiple medical comorbidities admitted to inpatient unit on 302 for overdosing on pills.  Patient says that she took her night medications and extra few pills of Excedrin for her migraine headaches \" I did not know that a few Excedrin pills will make me drowsy.  I was not trying to kill myself.  I was in pain.  I texted my ex, litae good night as usual.  I am not sure why they thought I would kill myself.  I have overdosed on pills 20 years ago.  Maybe my ex is trying to control me as we are going through divorce.  I did not want to come here\".  Patient endorses anxiety due to current medical issues, life stressors and relationship issues.  Endorses tiredness due to medical issues.  Sleep was disturbed.  Denies any symptoms of noel or psychosis.  Denies any thoughts to hurt herself or others.  Patient appears withdrawn, seclusive, dysphoric, irritable, superficial and anxious.\"      Social History       Tobacco History       Smoking Status  Never      Smokeless Tobacco Use  Never              Alcohol History       Alcohol Use Status  Not Currently Comment  few x year              Drug Use       Drug Use Status  No              Sexual Activity       Sexually Active  Not Asked              Other Factors    Not Asked                 Additional Substance Use Detail       Questions Responses    Problems Due to Past Use of Alcohol? No    Problems Due to Past Use " of Substances? No    Substance Use Assessment Denies substance use within the past 12 months    Alcohol Use Frequency Experimented    Cannabis frequency Never used    Comment:  Never used on 5/14/2025     Heroin Frequency Denies use in past 12 months    Cocaine frequency Never used    Comment:  Never used on 5/14/2025     Crack Cocaine Frequency Denies use in past 12 months    Methamphetamine Frequency Denies use in past 12 months    Narcotic Frequency Denies use in past 12 months    Benzodiazepine Frequency Denies use in past 12 months    Amphetamine frequency Denies use in past 12 months    Barbituate Frequency Denies use use in past 12 months    Inhalant frequency Never used    Comment:  Never used on 5/14/2025     Hallucinogen frequency Never used    Comment:  Never used on 5/14/2025     Ecstasy frequency Never used    Comment:  Never used on 5/14/2025     Other drug frequency Never used    Comment:  Never used on 5/14/2025     Opiate frequency Denies use in past 12 months    Last reviewed by Jackie Villalba RN on 5/14/2025            Past Medical History:   Diagnosis Date    Arthritis     Asthma     CPAP (continuous positive airway pressure) dependence     Depression     Diabetes mellitus (HCC)     Gait disturbance     uses w/c for long distance    Gastritis     Hiatal hernia     Hx of fusion of cervical spine     Hypertension     Migraines     Morbid obesity (HCC)     PONV (postoperative nausea and vomiting)     difficulty awakening- too much CO2    Pulmonary emboli (HCC)     hx blood clot also in neck    S/P insertion of spinal cord stimulator     Sleep apnea     Tachycardia     Wears contact lenses      Past Surgical History:   Procedure Laterality Date    COLONOSCOPY      DISCECTOMY SPINE CERVICAL POSTERIOR      HYSTERECTOMY      and bladder sling    JOINT REPLACEMENT      left TKR and partial right    NECK SURGERY      cervical fusion    NM LAPS GSTR RSTCV PX W/BYP GWYN-EN-Y LIMB <150 CM N/A 11/30/2021     Procedure: ROBOTIC BYPASS GASTRIC GWYN-EN-Y,INTRAOP EGD, PARAESOPHAGEAL HERNIA REPAIR W/ MESH;  Surgeon: Gabriel Gutierrez MD;  Location: [facility];  Service: Bariatrics    SHOULDER OPEN ROTATOR CUFF REPAIR      SPINAL CORD STIMULATOR IMPLANT         Medications:    All current active medications have been reviewed.  Medications prior to admission:    Prior to Admission Medications   Prescriptions Last Dose Informant Patient Reported? Taking?   DULoxetine (CYMBALTA) 60 mg delayed release capsule 5/13/2025 Self Yes Yes   Sig: Take 90 mg by mouth every evening   albuterol (PROVENTIL HFA,VENTOLIN HFA) 90 mcg/act inhaler More than a month Self Yes No   Sig: Inhale 2 puffs every 4 (four) hours as needed   atorvastatin (LIPITOR) 20 mg tablet Past Month  Yes Yes   Sig: Take 20 mg by mouth in the morning.   cyanocobalamin 1,000 mcg/mL Past Month  Yes Yes   Sig: Inject 1,000 mcg under the skin every 30 (thirty) days   diphenhydrAMINE (BENADRYL) 25 mg tablet More than a month  No No   Sig: Take 1 tablet (25 mg total) by mouth every 6 (six) hours as needed for itching (headaches)   eletriptan (RELPAX) 40 MG tablet Past Week  Yes Yes   Sig: Take 40 mg by mouth once as needed for migraine   ferrous gluconate (FERGON) 324 mg tablet 5/13/2025  Yes Yes   Sig: Take 324 mg by mouth daily with breakfast   gabapentin (NEURONTIN) 300 mg capsule  Self No No   Sig: Take 1 capsule (300 mg total) by mouth 3 (three) times a day   Patient taking differently: Take 300 mg by mouth in the morning and 300 mg in the evening.   methocarbamol (ROBAXIN) 500 mg tablet 5/13/2025  Yes Yes   Sig: Take 500 mg by mouth in the morning and 500 mg in the evening and 500 mg before bedtime.   metoclopramide (REGLAN) 10 mg tablet   No No   Sig: Take 1 tablet (10 mg total) by mouth 3 (three) times a day as needed (headaches)   pantoprazole (PROTONIX) 20 mg tablet 5/13/2025  No Yes   Sig: Take 1 tablet (20 mg total) by mouth 2 (two) times a day before meals    polyethylene glycol (MIRALAX) 17 g packet 5/13/2025  No Yes   Sig: Take 17 g by mouth daily as needed (constipation)   rimegepant sulfate (NURTEC) 75 mg TBDP Past Week  No Yes   Sig: Take 1 tablet (75 mg total) by mouth in the morning      Facility-Administered Medications: None       Allergies:     Allergies[1]    Objective     Vital signs in last 24 hours:    Temp:  [98 °F (36.7 °C)-98.9 °F (37.2 °C)] 98 °F (36.7 °C)  HR:  [69-78] 78  BP: (100-124)/(70-71) 124/71  Resp:  [18] 18  SpO2:  [98 %] 98 %  O2 Device: None (Room air)    No intake or output data in the 24 hours ending 05/19/25 0926    Hospital Course:     Marva was admitted to the inpatient psychiatric unit and started on Behavioral Health checks for safety monitoring. During the hospitalization she was encouraged to attend individual therapy, group therapy, milieu therapy and occupational therapy.    Psychiatric medications were continued during the hospital stay. To address irritability and anxiety symptoms, Marva was treated with antidepressant Cymbalta and anxiolytic medication Neurontin. Medication doses were continued during the hospital course. Cymbalta was continued at 90 mg daily. Neurontin was continued at 300 mg TID and 1200 mg qHS. Prior to beginning of treatment medications risks and benefits and possible side effects including risk of suicidality and serotonin syndrome related to treatment with antidepressants were reviewed with Marva. She verbalized understanding and agreement for treatment. Upon admission Marva was seen by medical service for medical clearance for inpatient treatment and medical follow up.    Marva's symptoms resolved over the hospital course. Initially after admission she was still feeling anxious and frustrated. With adjustment of medications and therapeutic milieu her symptoms gradually resolved. Marva was initially admitted as a 302 but later converted to 201 voluntary status. At the end of treatment Marva was stable.  "Her mood was improved at the time of discharge. Marva denied suicidal ideation, intent or plan at the time of discharge and denied homicidal ideation, intent or plan at the time of discharge. Marva remained adamant that her actions prior to admission did not represent a suicide attempt; she was consistently observed as goal oriented, forward thinking and more optimistic for the future. Marva was participating appropriately in milieu at the time of discharge. Behavior was appropriate on the unit at the time of discharge. Sleep and appetite were improved. Marva was tolerating medications and was not reporting any significant side effects at the time of discharge.    We felt that Marva achieved the maximum benefit of inpatient stay at that point, was at baseline at the end of the hospitalization and could now follow up with outpatient treatment. Marva also felt stable and ready for discharge at the end of the hospital stay. She said that she did not require refills of medication upon discharge and would follow up with her outpatient team.    The outpatient follow up with family physician Dr. Hendricks was arranged by the unit  upon discharge.    Mental Status at Time of Discharge:     Appearance: casually dressed, appears consistent with stated age, normal grooming  Motor: no psychomotor disturbances, no gait abnormalities, ambulates with a walker  Behavior: Pleasant, calm, cooperative, interacts with this writer appropriately  Speech: normal rate, rhythm, and volume  Mood: \"I am ready to go home\"  Affect: appropriate, bright, normal range and intensity, mood-congruent  Thought Process: Perseverative on medical issues, less circumstantial today  Thought Content: denies any delusional material, remains somatically preoccupied   Perception: denies any auditory or visual hallucinations, denies other perceptual disturbances  Risk Potential: Status post overdose prior to admission, continues to deny that this " represented a suicide attempt, currently denies suicidal ideation, plan, or intent. Denies homicidal ideation  Sensorium: Oriented to person, place, time, and situation  Cognition: cognitive ability appears intact but was not quantitatively tested  Consciousness: alert and awake  Attention/Concentration: able to focus without difficulty, attention and concentration are age appropriate  Insight: Fair  Judgement: Fair    Admission Diagnosis:    Principal Problem:    Moderate episode of recurrent major depressive disorder (HCC)  Active Problems:    Essential hypertension    Type 2 diabetes mellitus without complication, without long-term current use of insulin (HCC)    H/O gastric bypass    Iron deficiency anemia secondary to inadequate dietary iron intake    Intractable chronic migraine without aura and with status migrainosus      Discharge Diagnosis:     Principal Problem:    Moderate episode of recurrent major depressive disorder (HCC)  Active Problems:    Essential hypertension    Type 2 diabetes mellitus without complication, without long-term current use of insulin (HCC)    H/O gastric bypass    Iron deficiency anemia secondary to inadequate dietary iron intake    Intractable chronic migraine without aura and with status migrainosus  Resolved Problems:    Intentional overdose ([facility])    Medical clearance for psychiatric admission      Lab Results: I have personally reviewed all pertinent laboratory/tests results.  Most Recent Labs:   Lab Results   Component Value Date    WBC 6.44 05/15/2025    RBC 4.07 05/15/2025    HGB 9.2 (L) 05/15/2025    HCT 32.4 (L) 05/15/2025     05/15/2025    RDW 18.4 (H) 05/15/2025    NEUTROABS 3.16 05/15/2025    SODIUM 143 05/15/2025    K 4.0 05/15/2025     05/15/2025    CO2 29 05/15/2025    BUN 8 05/15/2025    CREATININE 0.82 05/15/2025    GLUC 95 05/15/2025    GLUF 95 05/15/2025    CALCIUM 8.9 05/15/2025    AST 15 05/15/2025    ALT 18 05/15/2025    ALKPHOS 79 05/15/2025     TP 5.6 (L) 05/15/2025    ALB 3.5 05/15/2025    TBILI 0.54 05/15/2025    CHOLESTEROL 138 05/15/2025    HDL 65 05/15/2025    TRIG 74 05/15/2025    LDLCALC 58 05/15/2025    NONHDLC 73 05/15/2025    HSI8YFOVIACL 4.311 05/13/2025    FREET4 1.07 03/07/2017    PREGUR Negative 10/03/2018    PREGSERUM Negative 05/15/2025    HCGQUANT <2 03/07/2017    HGBA1C 6.7 (H) 05/11/2025     05/11/2025       Discharge Medications:    See after visit summary for all reconciled discharge medications provided to patient and family.      Discharge instructions/Information to patient and family:     See after visit summary for information provided to patient and family.      Provisions for Follow-Up Care:    See after visit summary for information related to follow-up care and any pertinent home health orders.      Discharge Statement:    I spent 40 minutes discharging the patient. This time was spent on the day of discharge. I had direct contact with the patient on the day of discharge.     Additional documentation is required if more than 30 minutes were spent on discharge:    I reviewed with Marva importance of compliance with medications and outpatient treatment after discharge.  I discussed the medication regimen and possible side effects of the medications with Marva prior to discharge. At the time of discharge she was tolerating psychiatric medications.  I discussed outpatient follow up with Marva.  I reviewed with Marva crisis plan and safety plan upon discharge.  Marva was competent to understand risks and benefits of withholding information and risks and benefits of her actions.    Discharge on Two Antipsychotic Medications : Delaney Phillips PA-C 05/19/25      This note was constructed with the assistance of network approved dictation software. Please excuse any minor errors of syntax or grammar as a result.         [1]   Allergies  Allergen Reactions    Iodine - Food Allergy Anaphylaxis           Lyrica [Pregabalin]  Other (See Comments)     Suicidal ideations    Shellfish Allergy - Food Allergy Anaphylaxis    Wellbutrin [Bupropion] Hives     Shaky and dizzy    Wound Dressing Adhesive Hives     Including tegaderm dressing, paper tape ok but can react to it     Latex Hives    Percocet [Oxycodone-Acetaminophen] Itching

## 2025-05-18 NOTE — ASSESSMENT & PLAN NOTE
Lab Results   Component Value Date    HGBA1C 6.7 (H) 05/11/2025       Recent Labs     05/17/25  1618 05/17/25  2046 05/18/25  0812 05/18/25  1108   POCGLU 138 119 111 99       Blood Sugar Average: Last 72 hrs:  (P) [id number]

## 2025-05-18 NOTE — ASSESSMENT & PLAN NOTE
Lab Results   Component Value Date    HGBA1C 6.7 (H) 05/11/2025       Recent Labs     05/18/25  1108 05/18/25  1601 05/18/25 2059 05/19/25  0809   POCGLU 99 111 125 140       Blood Sugar Average: Last 72 hrs:  (P) 114

## 2025-05-18 NOTE — ASSESSMENT & PLAN NOTE
Continue Cymbalta 90 mg at bedtime.    This is 54-year-old female with history of depression/anxiety and multiple medical comorbidities admitted to inpatient unit on 302 for overdosing on pills.  Patient reports improvement in mood and agreeable to convert 302 in to voluntary status. Denies that overdose was a suicide attempt.    Remains adamant that she did not attempt suicide prior to admission and continues to deny suicidal thoughts throughout her time here on the unit.  Has been sufficiently observed and treated and appears psychiatrically stable for discharge tomorrow 5/19/2025.

## 2025-05-18 NOTE — PROGRESS NOTES
Progress Note - Behavioral Health   Name: Marva Salgado 54 y.o. female I MRN: [id number]  Unit/Bed#: -02 I Date of Admission: 5/14/2025   Date of Service: 5/18/2025 I Hospital Day: 4        Assessment & Plan  Moderate episode of recurrent major depressive disorder (HCC)  Continue Cymbalta 90 mg at bedtime.    This is 54-year-old female with history of depression/anxiety and multiple medical comorbidities admitted to inpatient unit on 302 for overdosing on pills.  Patient reports improvement in mood and agreeable to convert 302 in to voluntary status. Denies that overdose was a suicide attempt.    Remains adamant that she did not attempt suicide prior to admission and continues to deny suicidal thoughts throughout her time here on the unit.  Has been sufficiently observed and treated and appears psychiatrically stable for discharge tomorrow 5/19/2025.  Essential hypertension  As per medicine recommendations  Type 2 diabetes mellitus without complication, without long-term current use of insulin (HCC)  Lab Results   Component Value Date    HGBA1C 6.7 (H) 05/11/2025       Recent Labs     05/17/25  1618 05/17/25  2046 05/18/25  0812 05/18/25  1108   POCGLU 138 119 111 99       Blood Sugar Average: Last 72 hrs:  (P) [id number]    Intentional overdose (HCC)  As per medicine recommendations  H/O gastric bypass  As per medicine recommendations  Iron deficiency anemia secondary to inadequate dietary iron intake  As per medicine recommendations  Intractable chronic migraine without aura and with status migrainosus  As per medicine recommendations  Medical clearance for psychiatric admission  Consult medicine     Current medications:  Current Facility-Administered Medications   Medication Dose Route Frequency Provider Last Rate    aluminum-magnesium hydroxide-simethicone  30 mL Oral Q4H PRN Ngoc Maravilla MD      ascorbic acid  500 mg Oral HS Yamileth You PA-C      atorvastatin  20 mg Oral Daily With  Pineda Stevens PA-C      haloperidol lactate  2.5 mg Intramuscular Q4H PRN Max 4/day Ngoc Maravilla MD      And    LORazepam  1 mg Intramuscular Q4H PRN Max 4/day Ngoc Maravilla MD      And    benztropine  0.5 mg Intramuscular Q4H PRN Max 4/day Ngoc Maravilla MD      haloperidol lactate  5 mg Intramuscular Q4H PRN Max 4/day Ngoc Maravilla MD      And    LORazepam  2 mg Intramuscular Q4H PRN Max 4/day Ngoc Maravilla MD      And    benztropine  1 mg Intramuscular Q4H PRN Max 4/day Ngoc Maravilla MD      benztropine  1 mg Intramuscular Q4H PRN Max 6/day Ngoc Maravilla MD      benztropine  1 mg Oral Q4H PRN Max 6/day Ngoc Maravilla MD      bisacodyl  10 mg Rectal Daily PRN Ngoc Maravilla MD      dexamethasone  0.5 mg Oral Daily Cat Lewis PA-C      hydrOXYzine HCL  50 mg Oral Q6H PRN Max 4/day Ngoc Maravilla MD      Or    diphenhydrAMINE  50 mg Intramuscular Q6H PRN Ngoc Maravilla MD      DULoxetine  90 mg Oral QPM Anton Phillips PA-C      ferrous gluconate  324 mg Oral HS Yamileth You PA-C      gabapentin  300 mg Oral TID Clarke Stevens PA-C      And    gabapentin  1,200 mg Oral HS Clarke Stevens PA-C      haloperidol  1 mg Oral Q6H PRN Ngoc Maravilla MD      haloperidol  2.5 mg Oral Q4H PRN Max 4/day Ngoc Maravilla MD      haloperidol  5 mg Oral Q4H PRN Max 4/day Ngoc Maravilla MD      hydrOXYzine HCL  100 mg Oral Q6H PRN Max 4/day Ngoc Maravilla MD      Or    LORazepam  2 mg Intramuscular Q6H PRN Ngoc Maravilla MD      hydrOXYzine HCL  25 mg Oral Q6H PRN Max 4/day Ngoc Maravilla MD      insulin lispro  1-5 Units Subcutaneous TID AC Clarke Stevens PA-C      insulin lispro  1-5 Units Subcutaneous HS Clarke Stevens PA-C      lactulose  20 g Oral Daily PRN Yamileth You PA-C      melatonin  3 mg Oral HS Ngoc Maravilla MD      methocarbamol  1,000 mg Oral Q6H PRN Anton Phillips PA-C      pantoprazole  20 mg Oral BID DAVIE Mir  WILLARD Stevens      polyethylene glycol  17 g Oral Daily PRN Ngoc Maravilla MD      propranolol  10 mg Oral Q8H PRN Ngoc Maravilla MD      rimegepant sulfate  75 mg Oral Daily Clarke Stevens PA-C      senna-docusate sodium  1 tablet Oral Daily PRN Ngoc Maravilla MD      zolpidem  10 mg Oral HS PRN Anton Phillips PA-C         Risks / Benefits of Treatment:    Risks, benefits, and possible side effects of medications explained to patient and patient verbalizes understanding and agreement for treatment.    Progress Toward Goals: progressing    Treatment Planning:  All current active medications have been reviewed.  Continue to monitor response to treatment and assess for potential side effects of medications.  Encourage group therapy, milieu therapy and occupational therapy  Collaboration with medical service for medical comorbidities as indicated.  Behavioral Health checks for safety monitoring.  Estimated Discharge Day:     Subjective:    Behavior over the last 24 hours: unchanged    No acute behavioral events over the past 24 hours. Today, patient was seen and examined at bedside for continuation of care.     This is a 54-year-old female with a history of MDD and multiple medical comorbidities who presents for psychiatric follow-up.  She remains very pleasant, calm and cooperative upon approach and continues to be bright, visible and social in the milieu with active participation in inpatient programming.  Does remain very somatically preoccupied and is focused on attending her follow-ups with her pain management doctors in the outpatient setting.  She has numerous medical concerns but feels that her psychiatric status is at baseline.  She denies any and all psychiatric complaints, aside from some difficulty sleeping, says Ambien has worked well for her in the past.  Denies SI and HI.  Remains adamant that she did not attempt suicide prior to admission.  States that her family blew this whole thing out  "of proportion.  She says, \"I texted my ex that I fed the dogs, was taking medication and goodbye.  I do not know how he interpreted that as I was trying to kill myself.\"  No AH or VH.  Continues to be forward thinking, goal oriented and optimistic for the future.    Patient denied other new or worsening psychiatric symptoms/complaints at this time.    Sleep: difficulty falling and staying asleep  Appetite: unchanged  Medication side effects: none reported  ROS: review of systems as noted in HPI, all other systems are negative    Objective:    Vital signs in last 24 hours:    Temp:  [98.6 °F (37 °C)] 98.6 °F (37 °C)  HR:  [75] 75  BP: (133)/(85) 133/85  Resp:  [18] 18  SpO2:  [100 %] 100 %  O2 Device: None (Room air)    Mental Status Evaluation:    Appearance: Hospital attire, appears consistent with stated age, normal grooming  Motor: no psychomotor disturbances, no gait abnormalities, ambulates with a walker  Behavior: Pleasant, calm, cooperative, interacts with this writer appropriately  Speech: normal rate, rhythm, and volume  Mood: \"I am ready to go home\"  Affect: appropriate, bright, normal range and intensity, mood-congruent  Thought Process: Perseverative on medical issues, less circumstantial today  Thought Content: denies any delusional material, remains somatically preoccupied   Perception: denies any auditory or visual hallucinations, denies other perceptual disturbances  Risk Potential: Status post overdose prior to admission, denies that this represented a suicide attempt, currently denies suicidal ideation, plan, or intent. Denies homicidal ideation  Sensorium: Oriented to person, place, time, and situation  Cognition: cognitive ability appears intact but was not quantitatively tested  Consciousness: alert and awake  Attention/Concentration: able to focus without difficulty, attention and concentration are age appropriate  Insight: Limited  Judgement: Fair    Lab Results: I have reviewed the following " results:  Recent Results (from the past 48 hours)   Fingerstick Glucose (POCT)    Collection Time: 05/16/25 11:31 AM   Result Value Ref Range    POC Glucose 86 65 - 140 mg/dl   Fingerstick Glucose (POCT)    Collection Time: 05/16/25  4:08 PM   Result Value Ref Range    POC Glucose 174 (H) 65 - 140 mg/dl   Fingerstick Glucose (POCT)    Collection Time: 05/16/25 10:46 PM   Result Value Ref Range    POC Glucose 138 65 - 140 mg/dl   Fingerstick Glucose (POCT)    Collection Time: 05/17/25  8:02 AM   Result Value Ref Range    POC Glucose 107 65 - 140 mg/dl   Fingerstick Glucose (POCT)    Collection Time: 05/17/25 11:06 AM   Result Value Ref Range    POC Glucose 106 65 - 140 mg/dl   Fingerstick Glucose (POCT)    Collection Time: 05/17/25  4:18 PM   Result Value Ref Range    POC Glucose 138 65 - 140 mg/dl   Fingerstick Glucose (POCT)    Collection Time: 05/17/25  8:46 PM   Result Value Ref Range    POC Glucose 119 65 - 140 mg/dl   Fingerstick Glucose (POCT)    Collection Time: 05/18/25  8:12 AM   Result Value Ref Range    POC Glucose 111 65 - 140 mg/dl   Fingerstick Glucose (POCT)    Collection Time: 05/18/25 11:08 AM   Result Value Ref Range    POC Glucose 99 65 - 140 mg/dl        Administrative Statements    Counseling / Coordination of Care:     Patients progress discussed with staff in treatment team meeting. Medication changes reviewed with staff in treatment team meeting.    Anton Phillips PA-C 05/18/25    This note has been constructed using a voice recognition system. There may be translation, syntax, or grammatical errors. If you have any questions, please contact the dictating provider.

## 2025-05-18 NOTE — PLAN OF CARE
Problem: Knowledge Deficit  Goal: Patient/family/caregiver demonstrates understanding of disease process, treatment plan, medications, and discharge instructions  Description: Complete learning assessment and assess knowledge base.  Interventions:  - Provide teaching at level of understanding  - Provide teaching via preferred learning methods  Outcome: Progressing     Problem: DEPRESSION  Goal: Will be euthymic at discharge  Description: INTERVENTIONS:  - Administer medication as ordered  - Provide emotional support via 1:1 interaction with staff  - Encourage involvement in milieu/groups/activities  - Monitor for social isolation  Outcome: Progressing     Problem: ANXIETY  Goal: Will report anxiety at manageable levels  Description: INTERVENTIONS:  - Administer medication as ordered  - Teach and encourage coping skills  - Provide emotional support  - Assess patient/family for anxiety and ability to cope  Outcome: Progressing  Goal: By discharge: Patient will verbalize 2 strategies to deal with anxiety  Description: Interventions:  - Identify any obvious source/trigger to anxiety  - Staff will assist patient in applying identified coping technique/skills  - Encourage attendance of scheduled groups and activities  Outcome: Progressing     Problem: Ineffective Coping  Goal: Participates in unit activities  Description: Interventions:  - Provide therapeutic environment   - Provide required programming   - Redirect inappropriate behaviors   Outcome: Progressing     Problem: INVOLUNTARY ADMIT  Goal: Will cooperate with staff recommendations and doctor's orders and will demonstrate appropriate behavior  Description: INTERVENTIONS:  - Treat underlying conditions and offer medication as ordered  - Educate regarding involuntary admission procedures and rules  - Utilize positive consistent limit setting strategies to support patient and staff safety  Outcome: Completed

## 2025-05-18 NOTE — ASSESSMENT & PLAN NOTE
Continue Cymbalta 90 mg at bedtime.    This is 54-year-old female with history of depression/anxiety and multiple medical comorbidities admitted to inpatient unit on 302 for overdosing on pills.  Patient reports improvement in mood and agreeable to convert 302 in to voluntary status. Denies that overdose was a suicide attempt.    Remains adamant that she did not attempt suicide prior to admission and continues to deny suicidal thoughts throughout her time here on the unit.  Has been sufficiently observed and treated and appears psychiatrically stable for discharge today 5/19/2025.

## 2025-05-19 ENCOUNTER — TELEPHONE (OUTPATIENT)
Age: 55
End: 2025-05-19

## 2025-05-19 VITALS
SYSTOLIC BLOOD PRESSURE: 124 MMHG | BODY MASS INDEX: 29.97 KG/M2 | HEIGHT: 66 IN | HEART RATE: 78 BPM | TEMPERATURE: 98 F | RESPIRATION RATE: 18 BRPM | WEIGHT: 186.51 LBS | DIASTOLIC BLOOD PRESSURE: 71 MMHG | OXYGEN SATURATION: 98 %

## 2025-05-19 LAB — GLUCOSE SERPL-MCNC: 140 MG/DL (ref 65–140)

## 2025-05-19 PROCEDURE — 82948 REAGENT STRIP/BLOOD GLUCOSE: CPT

## 2025-05-19 RX ORDER — DEXAMETHASONE 0.5 MG/1
0.5 TABLET ORAL DAILY
Start: 2025-05-19

## 2025-05-19 RX ORDER — GABAPENTIN 300 MG/1
CAPSULE ORAL
Start: 2025-05-19

## 2025-05-19 RX ADMIN — GABAPENTIN 300 MG: 300 CAPSULE ORAL at 08:24

## 2025-05-19 RX ADMIN — DEXAMETHASONE 0.5 MG: 0.5 TABLET ORAL at 08:24

## 2025-05-19 RX ADMIN — PANTOPRAZOLE SODIUM 20 MG: 20 TABLET, DELAYED RELEASE ORAL at 06:03

## 2025-05-19 RX ADMIN — RIMEGEPANT SULFATE 75 MG: 75 TABLET, ORALLY DISINTEGRATING ORAL at 08:24

## 2025-05-19 NOTE — NURSING NOTE
"Marva is visible on the unit. She reports readiness for discharge. Denies questions or concerns. States she \"has been ready to go since I got here.\" Denies SI/H/AVH.   "

## 2025-05-19 NOTE — TELEPHONE ENCOUNTER
[facility] sent to     Almaz Ashton  Good Morning,    Referral received for patient. Patient is scheduled with Joaquina Ojeda at the [facility] on 6/23 at 3:30pm.    Thank you

## 2025-05-19 NOTE — PLAN OF CARE
Problem: DISCHARGE PLANNING  Goal: Discharge to home or other facility with appropriate resources  Description: INTERVENTIONS:  - Identify barriers to discharge w/patient and caregiver  - Arrange for needed discharge resources and transportation as appropriate  - Identify discharge learning needs (meds, wound care, etc.)  - Arrange for interpretive services to assist at discharge as needed  - Refer to Case Management Department for coordinating discharge planning if the patient needs post-hospital services based on physician/advanced practitioner order or complex needs related to functional status, cognitive ability, or social support system  Outcome: Progressing     Problem: Knowledge Deficit  Goal: Patient/family/caregiver demonstrates understanding of disease process, treatment plan, medications, and discharge instructions  Description: Complete learning assessment and assess knowledge base.  Interventions:  - Provide teaching at level of understanding  - Provide teaching via preferred learning methods  Outcome: Progressing     Problem: DEPRESSION  Goal: Will be euthymic at discharge  Description: INTERVENTIONS:  - Administer medication as ordered  - Provide emotional support via 1:1 interaction with staff  - Encourage involvement in milieu/groups/activities  - Monitor for social isolation  Outcome: Progressing     Problem: ANXIETY  Goal: Will report anxiety at manageable levels  Description: INTERVENTIONS:  - Administer medication as ordered  - Teach and encourage coping skills  - Provide emotional support  - Assess patient/family for anxiety and ability to cope  Outcome: Progressing  Goal: By discharge: Patient will verbalize 2 strategies to deal with anxiety  Description: Interventions:  - Identify any obvious source/trigger to anxiety  - Staff will assist patient in applying identified coping technique/skills  - Encourage attendance of scheduled groups and activities  Outcome: Progressing     Problem:  Ineffective Coping  Goal: Participates in unit activities  Description: Interventions:  - Provide therapeutic environment   - Provide required programming   - Redirect inappropriate behaviors   Outcome: Progressing

## 2025-05-19 NOTE — PROGRESS NOTES
05/19/25 0752   Team Meeting   Meeting Type Daily Rounds   Team Members Present   Team Members Present Physician;Nurse;   Physician Team Member Dr. Boyle / WILLARD Phillips / ELISA Garcia   Nursing Team Member Highland Holiday   Care Management Team Member Daisha / Yumiko / Rosana / Ilya   Patient/Family Present   Patient Present No   Patient's Family Present No     Pt needs redirection regarding Diabetes care, intrusive with roommates care and diabetes. Reported she feels improved and ready to dc today.     DC: Today - pt will need transportation.

## 2025-05-19 NOTE — NURSING NOTE
Patient requested and received Ambien 10 mg PO as per Prn order at 2108. Upon follow up, patient appears to be asleep in bed, in no apparent distress. Medication appears effective.

## 2025-05-19 NOTE — CASE MANAGEMENT
"CM received a call from Nursing saying that pt is downstairs in the [facility] and the  needs to speak to someone as pt is asking to be transported to a different address.     CM went down to the [facility] and spoke to  who cancelled the ride.     CM spoke to pt as well. Pt was scheduled to go to her home at [address] [place] [address], [place] [address] but when CM asked about it, pt reported \"I want to go to [place].\" Pt then talking about wanting to go  another patient that was just discharged to the [facility] in [place]. CM explained to her that CM is unable to change her ride information and CM cannot send her there.      already cancelled ride and picked up another one so he was not able to take pt to [place].     CM informed pt that CM will go back up to the unit to check about her ride.     CM went back down to [facility] to inform pt that she would need to find her own UBER/LYFT on her own if she wants to go to [place] or CM can re-dispatch ride to her home in [place]. When CM went back down, pt was no longer there.     It appears that pt found alternate transportation.   "
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
## 2025-05-19 NOTE — CASE MANAGEMENT
"CM sent ASAP referral to see if pt can be scheduled for with a Psychiatrist at [place] or [place] location. CM also sent in basket message.     They responded:   \"Referral received for patient. Patient is scheduled with Joaquina Ojeda at the [place] location on 6/23 at 3:30pm.\"    9:35am   CM met with pt to check in about dc plans for today. Pt in group and saying goodbye to peers. Pt somatic during conversation.     CM informed her CM attempted to meet with her on Friday but she was in bed with head covered. Pt reported \"yeah I had a migraine. I had one for 30 days, but not the last 3 days.\"     Pt focused on dc and when she can get a ride. Pt asked CM about various things including getting her medicaid as she currently has copays for her appointments and can't afford it, pt also speaking about needing to get a divorce etc. CM explained that she can seek ICM services upon dc.     Pt also reported that she plans to return to [place] after dc and all her providers are out there and reported she would plan to seek [facility]OP at [facility] in [place]. Pt reported only one of her doctors is in [place]. CM scheduled pt with [facility] in [place] in the meantime until she can get connected in [place]. Pt reported she has medications at home for her current meds.     CM provided pt with information on [employer] and Crisis information for [facility] [place].     AYE scheduled transportation for 10:15am to [facility] [place] [address]       "

## 2025-05-19 NOTE — PLAN OF CARE
Problem: DISCHARGE PLANNING  Goal: Discharge to home or other facility with appropriate resources  Description: INTERVENTIONS:  - Identify barriers to discharge w/patient and caregiver  - Arrange for needed discharge resources and transportation as appropriate  - Identify discharge learning needs (meds, wound care, etc.)  - Arrange for interpretive services to assist at discharge as needed  - Refer to Case Management Department for coordinating discharge planning if the patient needs post-hospital services based on physician/advanced practitioner order or complex needs related to functional status, cognitive ability, or social support system  5/19/2025 0959 by Mary Oliveira RN  Outcome: Adequate for Discharge  5/19/2025 0959 by Mary Oliveira RN  Outcome: Progressing     Problem: Knowledge Deficit  Goal: Patient/family/caregiver demonstrates understanding of disease process, treatment plan, medications, and discharge instructions  Description: Complete learning assessment and assess knowledge base.  Interventions:  - Provide teaching at level of understanding  - Provide teaching via preferred learning methods  5/19/2025 0959 by Mary Oliveira RN  Outcome: Adequate for Discharge  5/19/2025 0959 by Mary Oliveira RN  Outcome: Progressing     Problem: DEPRESSION  Goal: Will be euthymic at discharge  Description: INTERVENTIONS:  - Administer medication as ordered  - Provide emotional support via 1:1 interaction with staff  - Encourage involvement in milieu/groups/activities  - Monitor for social isolation  5/19/2025 0959 by Mary Oliveira RN  Outcome: Adequate for Discharge  5/19/2025 0959 by Mary Oliveira RN  Outcome: Progressing     Problem: ANXIETY  Goal: Will report anxiety at manageable levels  Description: INTERVENTIONS:  - Administer medication as ordered  - Teach and encourage coping skills  - Provide emotional support  - Assess patient/family for anxiety and ability to cope  5/19/2025 0959 by  Mary Oliveira RN  Outcome: Adequate for Discharge  5/19/2025 0959 by Mary Oliveira RN  Outcome: Progressing  Goal: By discharge: Patient will verbalize 2 strategies to deal with anxiety  Description: Interventions:  - Identify any obvious source/trigger to anxiety  - Staff will assist patient in applying identified coping technique/skills  - Encourage attendance of scheduled groups and activities  5/19/2025 0959 by Mary Oliveira RN  Outcome: Adequate for Discharge  5/19/2025 0959 by Mary Oliveira RN  Outcome: Progressing     Problem: Ineffective Coping  Goal: Participates in unit activities  Description: Interventions:  - Provide therapeutic environment   - Provide required programming   - Redirect inappropriate behaviors   5/19/2025 0959 by Mary Oliveira RN  Outcome: Adequate for Discharge  5/19/2025 0959 by Mary Oliveira RN  Outcome: Progressing

## 2025-05-27 ENCOUNTER — TELEPHONE (OUTPATIENT)
Dept: PSYCHIATRY | Facility: CLINIC | Age: 55
End: 2025-05-27

## (undated) DEVICE — BAG DECANTER

## (undated) DEVICE — 3000CC GUARDIAN II: Brand: GUARDIAN

## (undated) DEVICE — SUT MONOCRYL 4-0 PS-2 27 IN Y426H

## (undated) DEVICE — STAPLER 60 RELOAD GREEN: Brand: SUREFORM

## (undated) DEVICE — STAPLER 60 RELOAD BLUE: Brand: SUREFORM

## (undated) DEVICE — 10 MM BABCOCKS WITH RATCHET HANDLES: Brand: ENDOPATH

## (undated) DEVICE — STRL COTTON TIP APPLCTR 6IN PK: Brand: CARDINAL HEALTH

## (undated) DEVICE — MONOPOLAR CURVED SCISSORS: Brand: ENDOWRIST

## (undated) DEVICE — STAPLER 60: Brand: SUREFORM

## (undated) DEVICE — PLUMEPEN PRO 10FT

## (undated) DEVICE — ADHESIVE SKIN HIGH VISCOSITY EXOFIN 1ML

## (undated) DEVICE — SEALANT FIBRIN VISTASEAL 10ML

## (undated) DEVICE — BLADELESS OBTURATOR: Brand: WECK VISTA

## (undated) DEVICE — Device: Brand: DEFENDO AIR/WATER/SUCTION AND BIOPSY VALVE

## (undated) DEVICE — ABSORBABLE WOUND CLOSURE DEVICE: Brand: V-LOC 90

## (undated) DEVICE — ARM DRAPE

## (undated) DEVICE — URETERAL CATHETER ADAPTOR TIP

## (undated) DEVICE — COLUMN DRAPE

## (undated) DEVICE — TROCAR: Brand: KII FIOS FIRST ENTRY

## (undated) DEVICE — CANNULA SEAL

## (undated) DEVICE — SEAL

## (undated) DEVICE — STAPLER 60 RELOAD WHITE: Brand: SUREFORM

## (undated) DEVICE — PBT NON ABSORBABLE WOUND CLOSURE DEVICE: Brand: V-LOC

## (undated) DEVICE — DRAPE EQUIPMENT RF WAND

## (undated) DEVICE — ENDOPATH PNEUMONEEDLE INSUFFLATION NEEDLES WITH LUER LOCK CONNECTORS 120MM: Brand: ENDOPATH

## (undated) DEVICE — VIOLET BRAIDED (POLYGLACTIN 910), SYNTHETIC ABSORBABLE SUTURE: Brand: COATED VICRYL

## (undated) DEVICE — WEBRIL 6 IN UNSTERILE

## (undated) DEVICE — STAPLER CANNULA SEAL: Brand: ENDOWRIST

## (undated) DEVICE — CADIERE FORCEPS: Brand: ENDOWRIST

## (undated) DEVICE — REDUCER: Brand: ENDOWRIST

## (undated) DEVICE — TRAVELKIT CONTAINS FIRST STEP KIT (200ML EP-4 KIT) AND SOILED SCOPE BAG - 1 KIT: Brand: TRAVELKIT CONTAINS FIRST STEP KIT AND SOILED SCOPE BAG

## (undated) DEVICE — [HIGH FLOW INSUFFLATOR,  DO NOT USE IF PACKAGE IS DAMAGED,  KEEP DRY,  KEEP AWAY FROM SUNLIGHT,  PROTECT FROM HEAT AND RADIOACTIVE SOURCES.]: Brand: PNEUMOSURE

## (undated) DEVICE — MEGA SUTURECUT ND: Brand: ENDOWRIST

## (undated) DEVICE — VISIGI 3D®  CALIBRATION SYSTEM  SIZE 36FR BYPASS/SHORT: Brand: BOEHRINGER® VISIGI 3D®  CALIBRATION SYSTEM  SIZE 36FR BYPASS/SHORT

## (undated) DEVICE — TIP COVER ACCESSORY

## (undated) DEVICE — KIT, ROBOTIC BARIATRIC: Brand: CARDINAL HEALTH

## (undated) DEVICE — VESSEL SEALER EXTEND: Brand: ENDOWRIST

## (undated) DEVICE — BLUE HEAT SCOPE WARMER

## (undated) DEVICE — SCD SEQUENTIAL COMPRESSION COMFORT SLEEVE MEDIUM KNEE LENGTH: Brand: KENDALL SCD

## (undated) DEVICE — ASTOUND STANDARD SURGICAL GOWN, XL: Brand: CONVERTORS

## (undated) DEVICE — 2000CC GUARDIAN II: Brand: GUARDIAN

## (undated) DEVICE — ENDOPATH XCEL BLADELESS TROCARS WITH STABILITY SLEEVES: Brand: ENDOPATH XCEL

## (undated) DEVICE — GLOVE SRG BIOGEL 7.5

## (undated) DEVICE — LAPAROSCOPIC DUAL RIGID APPLICATOR: Brand: ETHICON

## (undated) DEVICE — SUT ETHIBOND 0 CT-1 30 IN X424H